# Patient Record
Sex: FEMALE | Race: WHITE | NOT HISPANIC OR LATINO | Employment: OTHER | URBAN - METROPOLITAN AREA
[De-identification: names, ages, dates, MRNs, and addresses within clinical notes are randomized per-mention and may not be internally consistent; named-entity substitution may affect disease eponyms.]

---

## 2023-12-14 ENCOUNTER — HOSPITAL ENCOUNTER (INPATIENT)
Facility: HOSPITAL | Age: 88
LOS: 7 days | Discharge: NURSING, REHAB OR LONG-TERM CARE | DRG: 871 | End: 2023-12-21
Attending: EMERGENCY MEDICINE | Admitting: INTERNAL MEDICINE
Payer: COMMERCIAL

## 2023-12-14 ENCOUNTER — APPOINTMENT (EMERGENCY)
Dept: RADIOLOGY | Facility: HOSPITAL | Age: 88
DRG: 871 | End: 2023-12-14
Payer: COMMERCIAL

## 2023-12-14 ENCOUNTER — APPOINTMENT (INPATIENT)
Dept: RADIOLOGY | Facility: HOSPITAL | Age: 88
DRG: 871 | End: 2023-12-14
Payer: COMMERCIAL

## 2023-12-14 DIAGNOSIS — Z79.899 POLYPHARMACY: ICD-10-CM

## 2023-12-14 DIAGNOSIS — R29.90 STROKE-LIKE SYMPTOMS: ICD-10-CM

## 2023-12-14 DIAGNOSIS — L03.113 CELLULITIS OF RIGHT UPPER EXTREMITY: ICD-10-CM

## 2023-12-14 DIAGNOSIS — L89.44 PRESSURE INJURY OF CONTIGUOUS REGION INVOLVING BACK, BUTTOCK, AND HIP, STAGE 4, UNSPECIFIED LATERALITY (HCC): ICD-10-CM

## 2023-12-14 DIAGNOSIS — I63.9 STROKE (HCC): Primary | ICD-10-CM

## 2023-12-14 PROBLEM — L89.94 STAGE IV DECUBITUS ULCER (HCC): Status: ACTIVE | Noted: 2023-12-14

## 2023-12-14 PROBLEM — N28.9 ACUTE KIDNEY INSUFFICIENCY: Status: ACTIVE | Noted: 2023-12-14

## 2023-12-14 PROBLEM — F51.01 PRIMARY INSOMNIA: Status: ACTIVE | Noted: 2023-12-14

## 2023-12-14 PROBLEM — R26.2 AMBULATORY DYSFUNCTION: Status: ACTIVE | Noted: 2023-12-14

## 2023-12-14 PROBLEM — F33.40 MAJOR DEPRESSIVE DISORDER, RECURRENT, IN REMISSION, UNSPECIFIED (HCC): Status: ACTIVE | Noted: 2023-12-14

## 2023-12-14 PROBLEM — E03.9 ACQUIRED HYPOTHYROIDISM: Status: ACTIVE | Noted: 2023-12-14

## 2023-12-14 PROBLEM — I10 PRIMARY HYPERTENSION: Status: ACTIVE | Noted: 2023-12-14

## 2023-12-14 PROBLEM — M51.34 DEGENERATIVE DISC DISEASE, THORACIC: Status: ACTIVE | Noted: 2023-12-14

## 2023-12-14 PROBLEM — G93.41 ACUTE METABOLIC ENCEPHALOPATHY: Status: ACTIVE | Noted: 2023-12-14

## 2023-12-14 PROBLEM — R79.89 ELEVATED TROPONIN: Status: ACTIVE | Noted: 2023-12-14

## 2023-12-14 LAB
2HR DELTA HS TROPONIN: -68 NG/L
2HR DELTA HS TROPONIN: -68 NG/L
4HR DELTA HS TROPONIN: -17 NG/L
4HR DELTA HS TROPONIN: -17 NG/L
ANION GAP SERPL CALCULATED.3IONS-SCNC: 6 MMOL/L
ANION GAP SERPL CALCULATED.3IONS-SCNC: 6 MMOL/L
APAP SERPL-MCNC: <2 UG/ML (ref 10–20)
APAP SERPL-MCNC: <2 UG/ML (ref 10–20)
APTT PPP: 31 SECONDS (ref 23–37)
APTT PPP: 31 SECONDS (ref 23–37)
BUN SERPL-MCNC: 35 MG/DL (ref 5–25)
BUN SERPL-MCNC: 35 MG/DL (ref 5–25)
CALCIUM SERPL-MCNC: 7.1 MG/DL (ref 8.4–10.2)
CALCIUM SERPL-MCNC: 7.1 MG/DL (ref 8.4–10.2)
CARDIAC TROPONIN I PNL SERPL HS: 23 NG/L
CARDIAC TROPONIN I PNL SERPL HS: 23 NG/L
CARDIAC TROPONIN I PNL SERPL HS: 74 NG/L
CARDIAC TROPONIN I PNL SERPL HS: 74 NG/L
CARDIAC TROPONIN I PNL SERPL HS: 91 NG/L
CARDIAC TROPONIN I PNL SERPL HS: 91 NG/L
CHLORIDE SERPL-SCNC: 102 MMOL/L (ref 96–108)
CHLORIDE SERPL-SCNC: 102 MMOL/L (ref 96–108)
CO2 SERPL-SCNC: 20 MMOL/L (ref 21–32)
CO2 SERPL-SCNC: 20 MMOL/L (ref 21–32)
CREAT SERPL-MCNC: 0.9 MG/DL (ref 0.6–1.3)
CREAT SERPL-MCNC: 0.9 MG/DL (ref 0.6–1.3)
CRP SERPL QL: 278.6 MG/L
CRP SERPL QL: 278.6 MG/L
ERYTHROCYTE [DISTWIDTH] IN BLOOD BY AUTOMATED COUNT: 16.7 % (ref 11.6–15.1)
ERYTHROCYTE [DISTWIDTH] IN BLOOD BY AUTOMATED COUNT: 16.7 % (ref 11.6–15.1)
ERYTHROCYTE [SEDIMENTATION RATE] IN BLOOD: >130 MM/HOUR (ref 0–29)
ERYTHROCYTE [SEDIMENTATION RATE] IN BLOOD: >130 MM/HOUR (ref 0–29)
ETHANOL SERPL-MCNC: <10 MG/DL
ETHANOL SERPL-MCNC: <10 MG/DL
FLUAV RNA RESP QL NAA+PROBE: NEGATIVE
FLUAV RNA RESP QL NAA+PROBE: NEGATIVE
FLUBV RNA RESP QL NAA+PROBE: NEGATIVE
FLUBV RNA RESP QL NAA+PROBE: NEGATIVE
GFR SERPL CREATININE-BSD FRML MDRD: 58 ML/MIN/1.73SQ M
GFR SERPL CREATININE-BSD FRML MDRD: 58 ML/MIN/1.73SQ M
GLUCOSE SERPL-MCNC: 150 MG/DL (ref 65–140)
GLUCOSE SERPL-MCNC: 150 MG/DL (ref 65–140)
GLUCOSE SERPL-MCNC: 182 MG/DL (ref 65–140)
GLUCOSE SERPL-MCNC: 182 MG/DL (ref 65–140)
HCT VFR BLD AUTO: 31.6 % (ref 34.8–46.1)
HCT VFR BLD AUTO: 31.6 % (ref 34.8–46.1)
HGB BLD-MCNC: 10.6 G/DL (ref 11.5–15.4)
HGB BLD-MCNC: 10.6 G/DL (ref 11.5–15.4)
INR PPP: 2.11 (ref 0.84–1.19)
INR PPP: 2.11 (ref 0.84–1.19)
LACTATE SERPL-SCNC: 1.6 MMOL/L (ref 0.5–2)
LACTATE SERPL-SCNC: 1.6 MMOL/L (ref 0.5–2)
MCH RBC QN AUTO: 28.4 PG (ref 26.8–34.3)
MCH RBC QN AUTO: 28.4 PG (ref 26.8–34.3)
MCHC RBC AUTO-ENTMCNC: 33.5 G/DL (ref 31.4–37.4)
MCHC RBC AUTO-ENTMCNC: 33.5 G/DL (ref 31.4–37.4)
MCV RBC AUTO: 85 FL (ref 82–98)
MCV RBC AUTO: 85 FL (ref 82–98)
PLATELET # BLD AUTO: 169 THOUSANDS/UL (ref 149–390)
PLATELET # BLD AUTO: 169 THOUSANDS/UL (ref 149–390)
PMV BLD AUTO: 10.5 FL (ref 8.9–12.7)
PMV BLD AUTO: 10.5 FL (ref 8.9–12.7)
POTASSIUM SERPL-SCNC: 4.5 MMOL/L (ref 3.5–5.3)
POTASSIUM SERPL-SCNC: 4.5 MMOL/L (ref 3.5–5.3)
PROTHROMBIN TIME: 23.3 SECONDS (ref 11.6–14.5)
PROTHROMBIN TIME: 23.3 SECONDS (ref 11.6–14.5)
RBC # BLD AUTO: 3.73 MILLION/UL (ref 3.81–5.12)
RBC # BLD AUTO: 3.73 MILLION/UL (ref 3.81–5.12)
RSV RNA RESP QL NAA+PROBE: NEGATIVE
RSV RNA RESP QL NAA+PROBE: NEGATIVE
SALICYLATES SERPL-MCNC: <5 MG/DL (ref 3–20)
SALICYLATES SERPL-MCNC: <5 MG/DL (ref 3–20)
SARS-COV-2 RNA RESP QL NAA+PROBE: NEGATIVE
SARS-COV-2 RNA RESP QL NAA+PROBE: NEGATIVE
SODIUM SERPL-SCNC: 128 MMOL/L (ref 135–147)
SODIUM SERPL-SCNC: 128 MMOL/L (ref 135–147)
TSH SERPL DL<=0.05 MIU/L-ACNC: 0.93 UIU/ML (ref 0.45–4.5)
TSH SERPL DL<=0.05 MIU/L-ACNC: 0.93 UIU/ML (ref 0.45–4.5)
WBC # BLD AUTO: 33.44 THOUSAND/UL (ref 4.31–10.16)
WBC # BLD AUTO: 33.44 THOUSAND/UL (ref 4.31–10.16)

## 2023-12-14 PROCEDURE — 84443 ASSAY THYROID STIM HORMONE: CPT | Performed by: INTERNAL MEDICINE

## 2023-12-14 PROCEDURE — 85652 RBC SED RATE AUTOMATED: CPT | Performed by: INTERNAL MEDICINE

## 2023-12-14 PROCEDURE — 80048 BASIC METABOLIC PNL TOTAL CA: CPT | Performed by: EMERGENCY MEDICINE

## 2023-12-14 PROCEDURE — 0241U HB NFCT DS VIR RESP RNA 4 TRGT: CPT | Performed by: EMERGENCY MEDICINE

## 2023-12-14 PROCEDURE — 87181 SC STD AGAR DILUTION PER AGT: CPT

## 2023-12-14 PROCEDURE — 82077 ASSAY SPEC XCP UR&BREATH IA: CPT

## 2023-12-14 PROCEDURE — 85027 COMPLETE CBC AUTOMATED: CPT | Performed by: EMERGENCY MEDICINE

## 2023-12-14 PROCEDURE — 70496 CT ANGIOGRAPHY HEAD: CPT

## 2023-12-14 PROCEDURE — 85610 PROTHROMBIN TIME: CPT | Performed by: EMERGENCY MEDICINE

## 2023-12-14 PROCEDURE — 74176 CT ABD & PELVIS W/O CONTRAST: CPT

## 2023-12-14 PROCEDURE — 36415 COLL VENOUS BLD VENIPUNCTURE: CPT | Performed by: EMERGENCY MEDICINE

## 2023-12-14 PROCEDURE — 71250 CT THORAX DX C-: CPT

## 2023-12-14 PROCEDURE — 96365 THER/PROPH/DIAG IV INF INIT: CPT

## 2023-12-14 PROCEDURE — 93005 ELECTROCARDIOGRAM TRACING: CPT

## 2023-12-14 PROCEDURE — 80143 DRUG ASSAY ACETAMINOPHEN: CPT

## 2023-12-14 PROCEDURE — 99291 CRITICAL CARE FIRST HOUR: CPT | Performed by: EMERGENCY MEDICINE

## 2023-12-14 PROCEDURE — 71045 X-RAY EXAM CHEST 1 VIEW: CPT

## 2023-12-14 PROCEDURE — 83605 ASSAY OF LACTIC ACID: CPT

## 2023-12-14 PROCEDURE — 82948 REAGENT STRIP/BLOOD GLUCOSE: CPT

## 2023-12-14 PROCEDURE — 99285 EMERGENCY DEPT VISIT HI MDM: CPT

## 2023-12-14 PROCEDURE — 84484 ASSAY OF TROPONIN QUANT: CPT | Performed by: EMERGENCY MEDICINE

## 2023-12-14 PROCEDURE — 99223 1ST HOSP IP/OBS HIGH 75: CPT | Performed by: PSYCHIATRY & NEUROLOGY

## 2023-12-14 PROCEDURE — 86140 C-REACTIVE PROTEIN: CPT | Performed by: INTERNAL MEDICINE

## 2023-12-14 PROCEDURE — 80179 DRUG ASSAY SALICYLATE: CPT

## 2023-12-14 PROCEDURE — 87040 BLOOD CULTURE FOR BACTERIA: CPT

## 2023-12-14 PROCEDURE — 87147 CULTURE TYPE IMMUNOLOGIC: CPT

## 2023-12-14 PROCEDURE — 96361 HYDRATE IV INFUSION ADD-ON: CPT

## 2023-12-14 PROCEDURE — 87154 CUL TYP ID BLD PTHGN 6+ TRGT: CPT

## 2023-12-14 PROCEDURE — 70498 CT ANGIOGRAPHY NECK: CPT

## 2023-12-14 PROCEDURE — 84484 ASSAY OF TROPONIN QUANT: CPT | Performed by: INTERNAL MEDICINE

## 2023-12-14 PROCEDURE — 99223 1ST HOSP IP/OBS HIGH 75: CPT | Performed by: INTERNAL MEDICINE

## 2023-12-14 PROCEDURE — 85730 THROMBOPLASTIN TIME PARTIAL: CPT | Performed by: EMERGENCY MEDICINE

## 2023-12-14 RX ORDER — LATANOPROST 50 UG/ML
1 SOLUTION/ DROPS OPHTHALMIC
COMMUNITY

## 2023-12-14 RX ORDER — DOCUSATE SODIUM 100 MG/1
100 CAPSULE, LIQUID FILLED ORAL 2 TIMES DAILY PRN
Status: DISCONTINUED | OUTPATIENT
Start: 2023-12-14 | End: 2023-12-18

## 2023-12-14 RX ORDER — DULOXETIN HYDROCHLORIDE 30 MG/1
30 CAPSULE, DELAYED RELEASE ORAL DAILY
COMMUNITY

## 2023-12-14 RX ORDER — METHOCARBAMOL 750 MG/1
750 TABLET, FILM COATED ORAL EVERY 4 HOURS PRN
COMMUNITY

## 2023-12-14 RX ORDER — ACETAMINOPHEN 325 MG/1
650 TABLET ORAL EVERY 6 HOURS PRN
Status: DISCONTINUED | OUTPATIENT
Start: 2023-12-14 | End: 2023-12-15

## 2023-12-14 RX ORDER — LORAZEPAM 1 MG/1
1 TABLET ORAL EVERY 6 HOURS PRN
COMMUNITY

## 2023-12-14 RX ORDER — LATANOPROST 50 UG/ML
1 SOLUTION/ DROPS OPHTHALMIC
Status: DISCONTINUED | OUTPATIENT
Start: 2023-12-14 | End: 2023-12-21 | Stop reason: HOSPADM

## 2023-12-14 RX ORDER — AMMONIUM LACTATE 12 G/100G
1 LOTION TOPICAL DAILY
COMMUNITY

## 2023-12-14 RX ORDER — ACETAMINOPHEN 325 MG/1
650 TABLET ORAL EVERY 6 HOURS PRN
COMMUNITY

## 2023-12-14 RX ORDER — LANOLIN ALCOHOL/MO/W.PET/CERES
3 CREAM (GRAM) TOPICAL
COMMUNITY

## 2023-12-14 RX ORDER — ONDANSETRON 2 MG/ML
4 INJECTION INTRAMUSCULAR; INTRAVENOUS EVERY 6 HOURS PRN
Status: DISCONTINUED | OUTPATIENT
Start: 2023-12-14 | End: 2023-12-21 | Stop reason: HOSPADM

## 2023-12-14 RX ORDER — SODIUM CHLORIDE, SODIUM GLUCONATE, SODIUM ACETATE, POTASSIUM CHLORIDE, MAGNESIUM CHLORIDE, SODIUM PHOSPHATE, DIBASIC, AND POTASSIUM PHOSPHATE .53; .5; .37; .037; .03; .012; .00082 G/100ML; G/100ML; G/100ML; G/100ML; G/100ML; G/100ML; G/100ML
100 INJECTION, SOLUTION INTRAVENOUS ONCE
Status: COMPLETED | OUTPATIENT
Start: 2023-12-14 | End: 2023-12-15

## 2023-12-14 RX ORDER — METOPROLOL SUCCINATE 100 MG/1
100 TABLET, EXTENDED RELEASE ORAL DAILY
Status: DISCONTINUED | OUTPATIENT
Start: 2023-12-15 | End: 2023-12-21 | Stop reason: HOSPADM

## 2023-12-14 RX ORDER — METOPROLOL SUCCINATE 100 MG/1
100 TABLET, EXTENDED RELEASE ORAL DAILY
COMMUNITY

## 2023-12-14 RX ORDER — AMMONIUM LACTATE 12 G/100G
1 LOTION TOPICAL DAILY
Status: DISCONTINUED | OUTPATIENT
Start: 2023-12-15 | End: 2023-12-21 | Stop reason: HOSPADM

## 2023-12-14 RX ORDER — PSEUDOEPHEDRINE HCL 30 MG
60 TABLET ORAL EVERY 6 HOURS PRN
COMMUNITY

## 2023-12-14 RX ORDER — ATORVASTATIN CALCIUM 10 MG/1
10 TABLET, FILM COATED ORAL DAILY
Status: DISCONTINUED | OUTPATIENT
Start: 2023-12-15 | End: 2023-12-21 | Stop reason: HOSPADM

## 2023-12-14 RX ORDER — LEVOTHYROXINE SODIUM 0.03 MG/1
25 TABLET ORAL
Status: DISCONTINUED | OUTPATIENT
Start: 2023-12-15 | End: 2023-12-21 | Stop reason: HOSPADM

## 2023-12-14 RX ORDER — AMIODARONE HYDROCHLORIDE 200 MG/1
200 TABLET ORAL DAILY
Status: DISCONTINUED | OUTPATIENT
Start: 2023-12-15 | End: 2023-12-21 | Stop reason: HOSPADM

## 2023-12-14 RX ORDER — LEVOTHYROXINE SODIUM 0.03 MG/1
25 TABLET ORAL DAILY
COMMUNITY

## 2023-12-14 RX ORDER — FLUTICASONE FUROATE AND VILANTEROL 100; 25 UG/1; UG/1
1 POWDER RESPIRATORY (INHALATION) DAILY
COMMUNITY

## 2023-12-14 RX ORDER — ALPRAZOLAM 0.5 MG/1
0.5 TABLET ORAL
COMMUNITY

## 2023-12-14 RX ORDER — HALOPERIDOL 2 MG/ML
1 SOLUTION ORAL EVERY 6 HOURS PRN
COMMUNITY

## 2023-12-14 RX ORDER — PSEUDOEPHEDRINE HYDROCHLORIDE 60 MG/1
60 TABLET, FILM COATED ORAL EVERY 6 HOURS PRN
Status: DISCONTINUED | OUTPATIENT
Start: 2023-12-14 | End: 2023-12-21 | Stop reason: HOSPADM

## 2023-12-14 RX ORDER — AMLODIPINE BESYLATE 5 MG/1
5 TABLET ORAL DAILY
Status: DISCONTINUED | OUTPATIENT
Start: 2023-12-15 | End: 2023-12-19

## 2023-12-14 RX ORDER — MAGNESIUM HYDROXIDE/ALUMINUM HYDROXICE/SIMETHICONE 120; 1200; 1200 MG/30ML; MG/30ML; MG/30ML
30 SUSPENSION ORAL EVERY 6 HOURS PRN
Status: DISCONTINUED | OUTPATIENT
Start: 2023-12-14 | End: 2023-12-21 | Stop reason: HOSPADM

## 2023-12-14 RX ORDER — LISINOPRIL 10 MG/1
10 TABLET ORAL DAILY
Status: DISCONTINUED | OUTPATIENT
Start: 2023-12-15 | End: 2023-12-21 | Stop reason: HOSPADM

## 2023-12-14 RX ORDER — AMIODARONE HYDROCHLORIDE 200 MG/1
200 TABLET ORAL DAILY
COMMUNITY

## 2023-12-14 RX ORDER — LISINOPRIL 10 MG/1
10 TABLET ORAL DAILY
COMMUNITY

## 2023-12-14 RX ORDER — OXYCODONE HYDROCHLORIDE AND ACETAMINOPHEN 5; 325 MG/1; MG/1
1 TABLET ORAL EVERY 8 HOURS PRN
COMMUNITY

## 2023-12-14 RX ORDER — LIDOCAINE 4 G/G
1 PATCH TOPICAL DAILY
COMMUNITY

## 2023-12-14 RX ORDER — ALPRAZOLAM 0.5 MG/1
0.5 TABLET, ORALLY DISINTEGRATING ORAL EVERY 6 HOURS PRN
COMMUNITY

## 2023-12-14 RX ORDER — FERROUS SULFATE 325(65) MG
325 TABLET ORAL
COMMUNITY

## 2023-12-14 RX ORDER — FERROUS SULFATE 325(65) MG
325 TABLET ORAL
Status: DISCONTINUED | OUTPATIENT
Start: 2023-12-14 | End: 2023-12-21 | Stop reason: HOSPADM

## 2023-12-14 RX ORDER — FLUTICASONE FUROATE AND VILANTEROL 100; 25 UG/1; UG/1
1 POWDER RESPIRATORY (INHALATION) DAILY
Status: DISCONTINUED | OUTPATIENT
Start: 2023-12-15 | End: 2023-12-21 | Stop reason: HOSPADM

## 2023-12-14 RX ORDER — AMLODIPINE BESYLATE AND ATORVASTATIN CALCIUM 5; 10 MG/1; MG/1
1 TABLET, FILM COATED ORAL DAILY
COMMUNITY

## 2023-12-14 RX ORDER — LIDOCAINE 50 MG/G
1 PATCH TOPICAL DAILY
Status: DISCONTINUED | OUTPATIENT
Start: 2023-12-15 | End: 2023-12-21 | Stop reason: HOSPADM

## 2023-12-14 RX ADMIN — SODIUM CHLORIDE 1000 ML: 0.9 INJECTION, SOLUTION INTRAVENOUS at 20:22

## 2023-12-14 RX ADMIN — IOHEXOL 85 ML: 350 INJECTION, SOLUTION INTRAVENOUS at 17:37

## 2023-12-14 RX ADMIN — VANCOMYCIN HYDROCHLORIDE 1500 MG: 1 INJECTION, POWDER, LYOPHILIZED, FOR SOLUTION INTRAVENOUS at 21:44

## 2023-12-14 RX ADMIN — SODIUM CHLORIDE, SODIUM GLUCONATE, SODIUM ACETATE, POTASSIUM CHLORIDE, MAGNESIUM CHLORIDE, SODIUM PHOSPHATE, DIBASIC, AND POTASSIUM PHOSPHATE 100 ML/HR: .53; .5; .37; .037; .03; .012; .00082 INJECTION, SOLUTION INTRAVENOUS at 21:44

## 2023-12-14 RX ADMIN — SODIUM CHLORIDE 500 ML: 0.9 INJECTION, SOLUTION INTRAVENOUS at 19:15

## 2023-12-14 RX ADMIN — CEFEPIME 2000 MG: 2 INJECTION, POWDER, FOR SOLUTION INTRAVENOUS at 20:22

## 2023-12-14 NOTE — ASSESSMENT & PLAN NOTE
Patient has a PMH of HTN, HLD, afib, asthma, obesity, and anxiety. Patient is on Eliquis 2.5 mg BID for afib.  Patient was discovered to have a R facial droop and slurred speech by her family. Her BP with EMS was  and Glucose in the 200s. Patient comes from [facility] with an unknown last known well. NIHSS of 7. CTH showed no acute intracranial abnormalities. CTA showed no large vessel occlusion or acute thrombus but it was a difficult study.    Initial NIHSS 7  Presenting deficits were: R facial droop and slurred speech - unable to be appreciated on NIHSS  Interventions: Patient not a candidate. Unclear time of onset outside appropriate time window.     Vascular risk factors: elevated cholesterol and weight    Workup:  Lab Results   Component Value Date    INR 2.09 (H) 12/15/2023      CTH: No evidence of acute vascular territorial infarction, intracranial hemorrhage or mass.   CTA: Technically suboptimal exam. No evidence of large vessel occlusion or acute thrombus in the proximal portions of the major vessels of the [place]. No evidence of hemodynamically significant stenosis or occlusion of the carotid or vertebral arteries. Small bilateral pleural effusions and bibasilar atelectasis.  MRI: Deferred  TTE/MARIANA 10/9/23:  Left ventricular cavity size is normal. Wall thickness is mildly increased. There is mild concentric hypertrophy. The left ventricular ejection fraction is 55-60%. Systolic function is normal. Wall motion is normal.Right Ventricle: A pacer wire is present. Left Atrium: The atrium is mildly dilated.    Metabolic Work-up:  WBC 33.44  Blood culture: Gram positive cocci in pairs and chains x2  Urine Microscopic: Bacteria innumerable   CRP: 278.6  Sed rate: >130  COVID/Flu/RSV: Negative    Pertinent scores as of 12/15/23:  - NIHSS: 7    Impression: Patient is an 88-year-old female who presented to the ED as a result of my symptoms.  Patient's symptoms secondary to metabolic  encephalopathy in the setting of sepsis. Will d/c stroke pathway at this time.    Plan:  Continue AP/AC: Eliquis 2.5 mg BID  Would recommend increasing Eliquis to 5 mg BID for primary stroke prevention secondary to Afib.  Continue cholesterol med: Lipitor 10 mg   Healthy diet encouraged  MRI Brain wo contrast deferred given severe metabolic derangements  ECHO deferred given last ECHO within the last 3 months  Optimize all metabolic derangements  PT/OT versus active exercise discussed  BP management and HTN med compliance discussed, continue normotension given unknown last known well  Rest of care as per primary care team  No further inpatient recommendations at this time, available via TT

## 2023-12-14 NOTE — CONSULTS
Consultation - Stroke   Lucero Booth 88 y.o. female MRN: [id number]  Unit/Bed#: ED 25 Encounter: [id number]      Assessment/Plan     Stroke-like symptoms  Assessment & Plan  Patient has a PMH of HTN, HLD, afib, asthma, obesity, and anxiety. Patient is on Eliquis 2.5 mg BID for afib.  Patient was discovered to have a R facial droop and slurred speech by her family. Her BP with EMS was  and Glucose in the 200s. Patient comes from [facility] with an unknown last known well. NIHSS of 7. CTH showed no acute intracranial abnormalities. CTA showed no large vessel occlusion or acute thrombus but it was a difficult study.    Initial NIHSS 7  Presenting deficits were: R facial droop and slurred speech - unable to be appreciated on NIHSS  Interventions: Patient not a candidate. Unclear time of onset outside appropriate time window.     Vascular risk factors: elevated cholesterol and weight    Workup:  Lab Results   Component Value Date    INR 2.11 (H) 12/14/2023      CTH: No evidence of acute vascular territorial infarction, intracranial hemorrhage or mass.   CTA: Technically suboptimal exam. No evidence of large vessel occlusion or acute thrombus in the proximal portions of the major vessels of the [place]. No evidence of hemodynamically significant stenosis or occlusion of the carotid or vertebral arteries. Small bilateral pleural effusions and bibasilar atelectasis.  MRI: Pending  TTE/MARIANA 10/9/23:  Left ventricular cavity size is normal. Wall thickness is mildly increased. There is mild concentric hypertrophy. The left ventricular ejection fraction is 55-60%. Systolic function is normal. Wall motion is normal.Right Ventricle: A pacer wire is present. Left Atrium: The atrium is mildly dilated.    Pertinent scores as of 12/14/23:  - NIHSS: 7    Impression: Patient is an 88-year-old female who presented to the ED as a result of my symptoms.  Patient's symptoms secondary to metabolic  encephalopathy.    Plan:  Continue AP/AC: Eliquis 2.5 mg BID  Unsure if patient on ASA at home. Continue AP/AC as per home medications  Continue cholesterol med: Lipitor 10 mg   Healthy diet encouraged  MRI Brain wo contrast pending   ECHO deferred given last ECHO within the last 3 months  Optimize all metabolic derangements  PT/OT versus active exercise discussed  BP management and HTN med compliance discussed, continue normotension given unknown last known well  Rest of care as per primary care team        Thrombolytic Decision: Patient not a candidate. Unclear time of onset outside appropriate time window.      Recommendations for outpatient neurological follow up have yet to be determined.    History of Present Illness     Reason for Consult / Principal Problem: Stroke-like symptoms  Hx and PE limited by: AMS  Patient last known well: Unknown  Stroke alert called: 4:47 PM  Neurology time of arrival: 4:50 PM    HPI: Lucero Booth is a 88 y.o. female who presents with stroke-like symptoms. Patient has a PMH of HTN, HLD, afib, asthma, obesity, and anxiety. Patient is on Eliquis 2.5 mg BID for afib.     Patient was discovered to have a R facial droop and slurred speech by her family. Her BP with EMS was  and Glucose in the 200s. Patient comes from [facility] with an unknown last known well. As per EMS, she was moaning the whole time and not following many commands. Limited history obtained given patient's altered mental status.    NIHSS of 7. CTH showed no acute intracranial abnormalities. CTA showed no large vessel occlusion or acute thrombus but it was a difficult study.    Patient not a candidate for TNK given unknown last known well.    Inpatient consult to Neurology  Consult performed by: Homero Kramer DO  Consult ordered by: Akash Carlisle MD          Review of Systems   Unable to perform ROS: Acuity of condition       Historical Information   No past medical history on file.  No past  surgical history on file.  Social History   Social History     Substance and Sexual Activity   Alcohol Use Not on file     Social History     Substance and Sexual Activity   Drug Use Not on file     No existing history information found.  No existing history information found.  Social History     Tobacco Use   Smoking Status Not on file   Smokeless Tobacco Not on file     Family History: No family history on file.    Review of previous medical records was completed.     Meds/Allergies   all current active meds have been reviewed    Not on File    Objective   Vitals:Blood pressure 143/65, pulse 72, resp. rate (!) 36, weight 100 kg (221 lb 5.5 oz), SpO2 94%.,There is no height or weight on file to calculate BMI.  No intake or output data in the 24 hours ending 12/14/23 1902    Invasive Devices:   Invasive Devices       Peripheral Intravenous Line  Duration             Peripheral IV 12/14/23 Dorsal (posterior);Left Hand <1 day    Peripheral IV 12/14/23 Right Antecubital <1 day                    Physical Exam  Vitals reviewed.   HENT:      Head: Normocephalic and atraumatic.      Mouth/Throat:      Mouth: Mucous membranes are moist.   Eyes:      Extraocular Movements: Extraocular movements intact and EOM normal.      Conjunctiva/sclera: Conjunctivae normal.   Cardiovascular:      Rate and Rhythm: Normal rate.   Pulmonary:      Comments: Patient on 4L O2 NC   Skin:     General: Skin is warm.   Neurological:      Mental Status: She is alert.      Coordination: Finger-nose-finger test: Unable to test given patient not understanding command. Heel-to-shin test: Unable to test given patient not understanding command.   Psychiatric:         Speech: Speech normal.      Comments: Some confusion versus altered mental status possibly       Neurologic Exam     Mental Status   Oriented to person.   Oriented to month.   Speech: speech is normal (No aphasia or dysarthria appreciated)  Level of consciousness: alert  Able to name  object. Able to repeat.     Cranial Nerves     CN II   Visual fields full to confrontation.     CN III, IV, VI   Extraocular motions are normal.     CN V   Facial sensation intact.     CN VII   Facial expression full, symmetric.     CN VIII   CN VIII normal.     CN XII   CN XII normal.     Motor Exam   Overall muscle tone: normal  Patient able to lift right arm and right leg against gravity with some drift appreciated in the right arm.  Patient unable to lift left arm against gravity and left leg there was a drift appreciated.     Sensory Exam   Pinprick normal.     Gait, Coordination, and Reflexes     Coordination   Finger-nose-finger test: Unable to test given patient not understanding command.  Heel-to-shin test: Unable to test given patient not understanding command.      NIHSS:  1a.Level of Consciousness: 0 = Alert   1b. LOC Questions: 1 = Answers one correctly   1c. LOC Commands: 0 = Obeys both correctly   2. Best Gaze: 0 = Normal   3. Visual: 0 = No visual field loss   4. Facial Palsy: 0=Normal symmetric movement   5a. Motor Right Arm: 2=Some effort against gravity, limb cannot get to or maintain (if cured) 90 (or 45) degrees, drifts down to bed, but has some effort against gravity   5b. Motor Left Arm: 3=No effort against gravity, limb falls   6a. Motor Right Le=No drift, limb holds 90 (or 45) degrees for full 10 seconds   6b. Motor Left Le=Drift, limb holds 90 (or 45) degrees but drifts down before full 10 seconds: does not hit bed   7. Limb Ataxia:  0=Does not understand   8. Sensory: 0=Normal; no sensory loss   9. Best Language:  0=No aphasia, normal   10. Dysarthria: 0=Normal articulation   11. Extinction and Inattention (formerly Neglect): 0=No abnormality   Total Score: 7     Time NIHSS was completed: 5:10 PM    Modified [place] Score: Unable to determine at this time    Lab Results: I have personally reviewed pertinent reports.    Imaging Studies: I have personally reviewed pertinent reports.    and I have personally reviewed pertinent films in PACS  EKG, Pathology, and Other Studies: I have personally reviewed pertinent reports.    VTE Prophylaxis: Sequential compression device (Venodyne)     Code Status: No Order  Advance Directive and Living Will:      Power of :    POLST:      Counseling / Coordination of Care  Total time spent today 55 minutes. Greater than 50% of total time was spent with the patient and / or family counseling and / or coordination of care. A description of the counseling / coordination of care: Stroke alert and further management

## 2023-12-14 NOTE — ED PROVIDER NOTES
History  Chief Complaint   Patient presents with    STROKE Alert     Pt having R sided facial droop and slurred speech. Unknown LKW.      HPI  MDM  Pt is a 88 Y O F, coming in as out of hospital stroke alert with symptoms of right facial droop and slurred speech. On arrival to the ED pt found to be more encephalopathic than focal stroke like symptoms. Hemodynamically stable, normal blood sugar. Initial NIHSS was 7 but consistent with more encephalopathy/AMS than focal deficits. Pt is on eliquis, and unknown last known normal, not a candidate for TNK. Pt is also on multiple benzos and pain meds .     Initial presentation pt is altered, hemodynamically stable, protecting  airway, breathing on her own.     On exam   General: VSS, NAD, awake, altered but protecting airway, tolerating secretions  Head: Normocephalic, atraumatic, nontender.  Eyes: EOM-No subconjunctival hemorrhages.  ENT: Nose atraumatic. MMM  No malocclusion. No stridor. Normal phonation. No drooling. Normal swallowing.   Neck: Trachea midline. No JVD.  CV: RRR.   Lungs: CTAB No tachypnea. No paradoxical motion.  Abd: +BS, soft, NT/ND. No guarding/rigidity.   MSK: Full ROM throughout. No lower extremity edema.   Skin: sacral ulcer, mild erythema of RUE, small wounds ont he LUE.  Neuro: , CN II-XII grossly intact. Motor/sensory grossly intact.  Psychiatric/Behavioral: Altered.    Exam: deferred      Ddx: acute metabolic encephalopathy, sepsis, poly pharmacy.     Plan: Pt was evaluated with sepsis labs, hemodynamically stable, unknown source of infection at this time, but will treat with empiric abx and admit for further evaluation and management.         Prior to Admission Medications   Prescriptions Last Dose Informant Patient Reported? Taking?   ALPRAZolam (NIRAVAM) 0.5 MG dissolvable tablet   Yes Yes   Sig: Take 0.5 mg by mouth every 6 (six) hours as needed for anxiety   ALPRAZolam (XANAX) 0.5 mg tablet   Yes Yes   Sig: Take 0.5 mg by mouth daily at  bedtime   DULoxetine (CYMBALTA) 30 mg delayed release capsule   Yes Yes   Sig: Take 30 mg by mouth daily   Diclofenac Sodium (VOLTAREN) 1 %   Yes Yes   Sig: Apply 2 g topically 4 (four) times a day   Fluticasone Furoate-Vilanterol (Breo Ellipta) 100-25 mcg/actuation inhaler   Yes Yes   Sig: Inhale 1 puff daily Rinse mouth after use.   LORazepam (ATIVAN) 1 mg tablet   Yes Yes   Sig: Take 1 mg by mouth every 6 (six) hours as needed for anxiety   Lidocaine (HM Lidocaine Patch) 4 % PTCH   Yes Yes   Sig: Apply 1 Application topically in the morning   acetaminophen (TYLENOL) 325 mg tablet   Yes Yes   Sig: Take 650 mg by mouth every 6 (six) hours as needed for mild pain, headaches or fever   amLODIPine-atorvastatin (CADUET) 5-10 MG per tablet   Yes Yes   Sig: Take 1 tablet by mouth daily   amiodarone 200 mg tablet   Yes Yes   Sig: Take 200 mg by mouth daily   ammonium lactate (LAC-HYDRIN) 12 % lotion   Yes Yes   Sig: Apply 1 Application topically daily   apixaban (Eliquis) 2.5 mg   Yes Yes   Sig: Take 2.5 mg by mouth 2 (two) times a day   camphor-menthol (SARNA) lotion   Yes Yes   Sig: Apply 1 Application topically as needed for itching   ferrous sulfate 325 (65 Fe) mg tablet   Yes Yes   Sig: Take 325 mg by mouth every other day   haloperidol (HALDOL) oral concentrated solution   Yes Yes   Sig: Take 1 mg by mouth every 6 (six) hours as needed for agitation   hyoscyamine (LEVSIN/SL) 0.125 mg SL tablet   Yes Yes   Sig: Take 0.125 mg by mouth every 6 (six) hours as needed for cramping   latanoprost (XALATAN) 0.005 % ophthalmic solution   Yes Yes   Si drop daily at bedtime   levothyroxine 25 mcg tablet   Yes Yes   Sig: Take 25 mcg by mouth daily   lisinopril (ZESTRIL) 10 mg tablet   Yes Yes   Sig: Take 10 mg by mouth daily   melatonin 3 mg   Yes Yes   Sig: Take 3 mg by mouth daily at bedtime   methocarbamol (ROBAXIN) 750 mg tablet   Yes Yes   Sig: Take 750 mg by mouth every 4 (four) hours as needed for muscle spasms    metoprolol succinate (TOPROL-XL) 100 mg 24 hr tablet   Yes Yes   Sig: Take 100 mg by mouth daily   oxyCODONE-acetaminophen (PERCOCET) 5-325 mg per tablet   Yes Yes   Sig: Take 1 tablet by mouth every 8 (eight) hours as needed for moderate pain   pseudoephedrine (SUDAFED) 30 mg tablet   Yes Yes   Sig: Take 60 mg by mouth every 6 (six) hours as needed for congestion      Facility-Administered Medications: None       No past medical history on file.    No past surgical history on file.    No family history on file.  I have reviewed and agree with the history as documented.    No existing history information found.  No existing history information found.        Review of Systems    Physical Exam  ED Triage Vitals   Temperature Pulse Respirations Blood Pressure SpO2   12/14/23 1930 12/14/23 1705 12/14/23 1705 12/14/23 1705 12/14/23 1705   (!) 97.3 °F (36.3 °C) 77 (!) 23 (!) 138/105 100 %      Temp Source Heart Rate Source Patient Position - Orthostatic VS BP Location FiO2 (%)   12/14/23 1930 12/14/23 1705 12/14/23 1740 12/14/23 2200 --   Oral Monitor Lying Left arm       Pain Score       12/15/23 0042       9             Orthostatic Vital Signs  Vitals:    12/17/23 2150 12/18/23 0323 12/18/23 0654 12/18/23 1135   BP: 125/55 130/55 127/55 137/56   Pulse: 64 61 61 62   Patient Position - Orthostatic VS:  Lying         Physical Exam    ED Medications  Medications   amiodarone tablet 200 mg (200 mg Oral Given 12/18/23 0854)   amLODIPine (NORVASC) tablet 5 mg (5 mg Oral Given 12/18/23 0854)     And   atorvastatin (LIPITOR) tablet 10 mg (10 mg Oral Given 12/18/23 0854)   ammonium lactate (LAC-HYDRIN) 12 % lotion 1 Application (1 Application Topical Given 12/18/23 0905)   Diclofenac Sodium (VOLTAREN) 1 % topical gel 2 g (2 g Topical Given 12/18/23 1204)   ferrous sulfate tablet 325 mg (325 mg Oral Given 12/16/23 2102)   Fluticasone Furoate-Vilanterol 100-25 mcg/actuation 1 puff (1 puff Inhalation Given 12/18/23 0907)    hyoscyamine (LEVSIN/SL) SL tablet 0.125 mg (has no administration in time range)   latanoprost (XALATAN) 0.005 % ophthalmic solution 1 drop (1 drop Both Eyes Given 12/17/23 2157)   levothyroxine tablet 25 mcg (25 mcg Oral Given 12/18/23 0519)   lidocaine (LIDODERM) 5 % patch 1 patch (1 patch Topical Medication Applied 12/18/23 0854)   lisinopril (ZESTRIL) tablet 10 mg (10 mg Oral Given 12/18/23 0854)   metoprolol succinate (TOPROL-XL) 24 hr tablet 100 mg (100 mg Oral Given 12/18/23 0854)   pseudoephedrine (SUDAFED) tablet 60 mg (has no administration in time range)   docusate sodium (COLACE) capsule 100 mg (100 mg Oral Given 12/18/23 1239)   ondansetron (ZOFRAN) injection 4 mg (has no administration in time range)   aluminum-magnesium hydroxide-simethicone (MAALOX) oral suspension 30 mL (has no administration in time range)   apixaban (ELIQUIS) tablet 5 mg (5 mg Oral Given 12/18/23 0854)   acetaminophen (TYLENOL) tablet 975 mg (975 mg Oral Given 12/18/23 0519)   HYDROmorphone HCl (DILAUDID) injection 0.2 mg (0.2 mg Intravenous Given 12/18/23 1203)   oxyCODONE (ROXICODONE) IR tablet 5 mg (5 mg Oral Given 12/18/23 0858)   oxyCODONE (ROXICODONE) split tablet 2.5 mg (has no administration in time range)   ceFAZolin (ANCEF) IVPB (premix in dextrose) 2,000 mg 50 mL (0 mg Intravenous Stopped 12/18/23 1233)   DULoxetine (CYMBALTA) delayed release capsule 30 mg (30 mg Oral Given 12/18/23 0854)   melatonin tablet 3 mg (3 mg Oral Given 12/17/23 2157)   magnesium sulfate IVPB (premix) SOLN 1 g (1 g Intravenous New Bag 12/18/23 1240)   iohexol (OMNIPAQUE) 350 MG/ML injection (MULTI-DOSE) 85 mL (85 mL Intravenous Given 12/14/23 1737)   sodium chloride 0.9 % bolus 500 mL (0 mL Intravenous Stopped 12/14/23 2115)   sodium chloride 0.9 % bolus 1,000 mL (0 mL Intravenous Stopped 12/14/23 2113)   vancomycin (VANCOCIN) 1500 mg in sodium chloride 0.9% 250 mL IVPB (0 mg Intravenous Stopped 12/14/23 2314)   cefepime (MAXIPIME) 2 g/50 mL  dextrose IVPB (0 mg Intravenous Stopped 12/14/23 2052)   multi-electrolyte (PLASMALYTE-A/ISOLYTE-S PH 7.4) IV solution (0 mL/hr Intravenous Stopped 12/15/23 0235)   potassium chloride 20 mEq IVPB (premix) (20 mEq Intravenous New Bag 12/15/23 1042)   furosemide (LASIX) injection 20 mg (20 mg Intravenous Given 12/15/23 1551)   potassium chloride 20 mEq IVPB (premix) (20 mEq Intravenous New Bag 12/15/23 1552)   furosemide (LASIX) injection 20 mg (20 mg Intravenous Given 12/16/23 1054)   magnesium sulfate IVPB (premix) SOLN 1 g (1 g Intravenous New Bag 12/17/23 1049)   furosemide (LASIX) injection 20 mg (20 mg Intravenous Given 12/17/23 1048)       Diagnostic Studies  Results Reviewed       Procedure Component Value Units Date/Time    Blood culture [[id number]]  (Abnormal) Collected: 12/14/23 2016    Lab Status: Final result Specimen: Blood from Arm, Left Updated: 12/17/23 0628     Blood Culture Beta Hemolytic Streptococcus Group G     Gram Stain Result Gram positive cocci in pairs and chains    Blood culture [[id number]]  (Abnormal)  (Susceptibility) Collected: 12/14/23 2016    Lab Status: Final result Specimen: Blood from Hand, Left Updated: 12/17/23 0628     Blood Culture Beta Hemolytic Streptococcus Group G     Gram Stain Result Gram positive cocci in pairs and chains    Susceptibility       Beta Hemolytic Streptococcus Group G (1)       Antibiotic Interpretation Microscan   Method Status    Penicillin Susceptible 0.023 ug/ml CHRISTEN Final    Ceftriaxone ($$) Susceptible 0.02 ug/ml CHRISTEN Final    Vancomycin ($) Susceptible 0.38 ug/ml CHRISTEN Final                       Blood Culture Identification Panel [[id number]]  (Abnormal) Collected: 12/14/23 2016    Lab Status: Final result Specimen: Blood from Hand, Left Updated: 12/17/23 0628     Streptococcus Detected    Narrative:      Film Array panel tests for 11 gram positive organisms, 15 gram negative organisms, 7 yeast species and 10 resistance genes.     MRSA culture [[id number]]   (Abnormal) Collected: 12/15/23 0456    Lab Status: Final result Specimen: Nares from Nose Updated: 12/16/23 7537     MRSA Culture Only Methicillin Resistant Staphylococcus aureus isolated      Please note: This patient requires contact precautions.    Vancomycin, trough [[id number]]  (Abnormal) Collected: 12/16/23 0638    Lab Status: Final result Specimen: Blood from Arm, Left Updated: 12/16/23 0852     Vancomycin Tr 21.8 ug/mL     Narrative:      Verify that this specimen was collected immediately prior to drug administration.    Reference range and result flagging reflect proper specimen collection protocol.    CBC and differential [[id number]]  (Abnormal) Collected: 12/15/23 0447    Lab Status: Final result Specimen: Blood from Arm, Right Updated: 12/15/23 0840     WBC 30.89 Thousand/uL      RBC 3.72 Million/uL      Hemoglobin 10.2 g/dL      Hematocrit 32.6 %      MCV 88 fL      MCH 27.4 pg      MCHC 31.3 g/dL      RDW 16.6 %      MPV 11.1 fL      Platelets 154 Thousands/uL     Narrative:      This is an appended report.  These results have been appended to a previously verified report.    Protime-INR [[id number]]  (Abnormal) Collected: 12/15/23 0447    Lab Status: Final result Specimen: Blood from Arm, Right Updated: 12/15/23 0712     Protime 23.1 seconds      INR 2.09    APTT [[id number]]  (Normal) Collected: 12/15/23 0447    Lab Status: Final result Specimen: Blood from Arm, Right Updated: 12/15/23 0712     PTT 36 seconds     Comprehensive metabolic panel [[id number]]  (Abnormal) Collected: 12/15/23 0447    Lab Status: Final result Specimen: Blood from Arm, Right Updated: 12/15/23 0612     Sodium 131 mmol/L      Potassium 3.4 mmol/L      Chloride 100 mmol/L      CO2 21 mmol/L      ANION GAP 10 mmol/L      BUN 34 mg/dL      Creatinine 0.96 mg/dL      Glucose 117 mg/dL      Calcium 7.7 mg/dL      Corrected Calcium 9.1 mg/dL      AST 28 U/L      ALT 12 U/L      Alkaline Phosphatase 127 U/L      Total Protein 6.1 g/dL       Albumin 2.2 g/dL      Total Bilirubin 1.28 mg/dL      eGFR 53 ml/min/1.73sq m     Narrative:      National Kidney Disease Foundation guidelines for Chronic Kidney Disease (CKD):     Stage 1 with normal or high GFR (GFR > 90 mL/min/1.73 square meters)    Stage 2 Mild CKD (GFR = 60-89 mL/min/1.73 square meters)    Stage 3A Moderate CKD (GFR = 45-59 mL/min/1.73 square meters)    Stage 3B Moderate CKD (GFR = 30-44 mL/min/1.73 square meters)    Stage 4 Severe CKD (GFR = 15-29 mL/min/1.73 square meters)    Stage 5 End Stage CKD (GFR <15 mL/min/1.73 square meters)  Note: GFR calculation is accurate only with a steady state creatinine    Magnesium [[id number]]  (Normal) Collected: 12/15/23 0447    Lab Status: Final result Specimen: Blood from Arm, Right Updated: 12/15/23 0612     Magnesium 1.9 mg/dL     Phosphorus [[id number]]  (Normal) Collected: 12/15/23 0447    Lab Status: Final result Specimen: Blood from Arm, Right Updated: 12/15/23 0612     Phosphorus 2.8 mg/dL     Urine Microscopic [[id number]]  (Abnormal) Collected: 12/15/23 0047    Lab Status: Final result Specimen: Urine, Straight Cath Updated: 12/15/23 0208     RBC, UA 2-4 /hpf      WBC, UA 1-2 /hpf      Epithelial Cells Occasional /hpf      Bacteria, UA Innumerable /hpf     UA w Reflex to Microscopic w Reflex to Culture [[id number]]  (Abnormal) Collected: 12/15/23 0047    Lab Status: Final result Specimen: Urine, Straight Cath Updated: 12/15/23 0105     Color, UA Yellow     Clarity, UA Clear     Specific Gravity, UA 1.035     pH, UA 5.5     Leukocytes, UA Negative     Nitrite, UA Negative     Protein, UA 30 (1+) mg/dl      Glucose, UA Negative mg/dl      Ketones, UA Negative mg/dl      Urobilinogen, UA 6.0 mg/dl      Bilirubin, UA Negative     Occult Blood, UA Trace    C-reactive protein [[id number]]  (Abnormal) Collected: 12/14/23 2241    Lab Status: Final result Specimen: Blood from Arm, Left Updated: 12/14/23 2308     .6 mg/L     HS Troponin I 4hr  [[id number]]  (Abnormal) Collected: 12/14/23 2201    Lab Status: Final result Specimen: Blood from Arm, Left Updated: 12/14/23 2242     hs TnI 4hr 74 ng/L      Delta 4hr hsTnI -17 ng/L     TSH, 3rd generation [[id number]]  (Normal) Collected: 12/14/23 1917    Lab Status: Final result Specimen: Blood from Arm, Right Updated: 12/14/23 2147     TSH 3RD GENERATON 0.929 uIU/mL     Sedimentation rate, automated [[id number]]  (Abnormal) Collected: 12/14/23 1917    Lab Status: Final result Specimen: Blood from Arm, Right Updated: 12/14/23 2119     Sed Rate >130 mm/hour     HS Troponin I 2hr [[id number]]  (Normal) Collected: 12/14/23 2016    Lab Status: Final result Specimen: Blood from Arm, Left Updated: 12/14/23 2057     hs TnI 2hr 23 ng/L      Delta 2hr hsTnI -68 ng/L     Lactic acid, plasma (w/reflex if result > 2.0) [[id number]]  (Normal) Collected: 12/14/23 2016    Lab Status: Final result Specimen: Blood from Arm, Left Updated: 12/14/23 2050     LACTIC ACID 1.6 mmol/L     Narrative:      Result may be elevated if tourniquet was used during collection.    Salicylate level [[id number]]  (Normal) Collected: 12/14/23 1917    Lab Status: Final result Specimen: Blood from Arm, Right Updated: 12/14/23 2008     Salicylate Lvl <5 mg/dL     Acetaminophen level-If concentration is detectable, please discuss with medical  on call. [[id number]]  (Abnormal) Collected: 12/14/23 1917    Lab Status: Final result Specimen: Blood from Arm, Right Updated: 12/14/23 2008     Acetaminophen Level <2 ug/mL     Ethanol [[id number]]  (Normal) Collected: 12/14/23 1917    Lab Status: Final result Specimen: Blood from Arm, Right Updated: 12/14/23 2006     Ethanol Lvl <10 mg/dL     CBC and Platelet [[id number]]  (Abnormal) Collected: 12/14/23 1917    Lab Status: Final result Specimen: Blood from Arm, Right Updated: 12/14/23 2000     WBC 33.44 Thousand/uL      RBC 3.73 Million/uL      Hemoglobin 10.6 g/dL      Hematocrit 31.6 %      MCV 85 fL       MCH 28.4 pg      MCHC 33.5 g/dL      RDW 16.7 %      Platelets 169 Thousands/uL      MPV 10.5 fL     FLU/RSV/COVID - if FLU/RSV clinically relevant [[id number]]  (Normal) Collected: 12/14/23 1808    Lab Status: Final result Specimen: Nares from Nose Updated: 12/14/23 1914     SARS-CoV-2 Negative     INFLUENZA A PCR Negative     INFLUENZA B PCR Negative     RSV PCR Negative    Narrative:      FOR PEDIATRIC PATIENTS - copy/paste COVID Guidelines URL to browser: [email or web address]    SARS-CoV-2 assay is a Nucleic Acid Amplification assay intended for the  qualitative detection of nucleic acid from SARS-CoV-2 in nasopharyngeal  swabs. Results are for the presumptive identification of SARS-CoV-2 RNA.    Positive results are indicative of infection with SARS-CoV-2, the virus  causing COVID-19, but do not rule out bacterial infection or co-infection  with other viruses. Laboratories within the [place] and its  territories are required to report all positive results to the appropriate  public health authorities. Negative results do not preclude SARS-CoV-2  infection and should not be used as the sole basis for treatment or other  patient management decisions. Negative results must be combined with  clinical observations, patient history, and epidemiological information.  This test has not been FDA cleared or approved.    This test has been authorized by FDA under an Emergency Use Authorization  (EUA). This test is only authorized for the duration of time the  declaration that circumstances exist justifying the authorization of the  emergency use of an in vitro diagnostic tests for detection of SARS-CoV-2  virus and/or diagnosis of COVID-19 infection under section 564(b)(1) of  the Act, 21 U.S.C. 360bbb-3(b)(1), unless the authorization is terminated  or revoked sooner. The test has been validated but independent review by FDA  and CLIA is pending.    Test  performed using [employer] GeneXpert: This RT-PCR assay targets N2,  a region unique to SARS-CoV-2. A conserved region in the E-gene was chosen  for pan-Sarbecovirus detection which includes SARS-CoV-2.    According to CMS-2020-01-R, this platform meets the definition of high-throughput technology.    Basic metabolic panel [[id number]]  (Abnormal) Collected: 12/14/23 1808    Lab Status: Final result Specimen: Blood from Arm, Right Updated: 12/14/23 1902     Sodium 128 mmol/L      Potassium 4.5 mmol/L      Chloride 102 mmol/L      CO2 20 mmol/L      ANION GAP 6 mmol/L      BUN 35 mg/dL      Creatinine 0.90 mg/dL      Glucose 150 mg/dL      Calcium 7.1 mg/dL      eGFR 58 ml/min/1.73sq m     Narrative:      National Kidney Disease Foundation guidelines for Chronic Kidney Disease (CKD):     Stage 1 with normal or high GFR (GFR > 90 mL/min/1.73 square meters)    Stage 2 Mild CKD (GFR = 60-89 mL/min/1.73 square meters)    Stage 3A Moderate CKD (GFR = 45-59 mL/min/1.73 square meters)    Stage 3B Moderate CKD (GFR = 30-44 mL/min/1.73 square meters)    Stage 4 Severe CKD (GFR = 15-29 mL/min/1.73 square meters)    Stage 5 End Stage CKD (GFR <15 mL/min/1.73 square meters)  Note: GFR calculation is accurate only with a steady state creatinine    HS Troponin 0hr (reflex protocol) [[id number]]  (Abnormal) Collected: 12/14/23 1808    Lab Status: Final result Specimen: Blood from Arm, Right Updated: 12/14/23 1856     hs TnI 0hr 91 ng/L     Protime-INR [[id number]]  (Abnormal) Collected: 12/14/23 1808    Lab Status: Final result Specimen: Blood from Arm, Right Updated: 12/14/23 1839     Protime 23.3 seconds      INR 2.11    APTT [[id number]]  (Normal) Collected: 12/14/23 1808    Lab Status: Final result Specimen: Blood from Arm, Right Updated: 12/14/23 1839     PTT 31 seconds     Fingerstick Glucose (POCT) [[id number]]  (Abnormal) Collected: 12/14/23 1708    Lab Status: Final result Updated: 12/14/23 1714     POC Glucose 182 mg/dl                     CT chest abdomen pelvis wo contrast   Final Result by Best Merida MD (12/15 0958)      1.  Moderate bilateral pleural effusions with overlying compressive atelectasis.      2.  Irregularity and sclerosis of the distal sacrum and coccyx underlying the large decubitus ulcer suspicious for osteomyelitis though chronicity is uncertain as there are no prior studies available for comparison. This would be better evaluated with    MRI.      3.  Hepatic cirrhosis.      4.  Right ventral hernia containing a nonobstructed loop of transverse colon.      The study was marked in EPIC for immediate notification.         Workstation performed: [id number]         XR chest portable   Final Result by Varghese De La Paz MD (12/15 0904)      Mild cardiomegaly.      Vascular congestion.      Layering pleural effusions and bibasilar atelectasis.      Per MRI query; no pacer.                  Workstation performed: [id number]         CTA stroke alert (head/neck)   Final Result by Fran Veras MD (12/14 1757)         1. Technically suboptimal exam. No evidence of large vessel occlusion or acute thrombus in the proximal portions of the major vessels of the [place].   2. No evidence of hemodynamically significant stenosis or occlusion of the carotid or vertebral arteries.   3. Small bilateral pleural effusions and bibasilar atelectasis.               Findings were directly discussed with Troy Vanessa at 5:55 PM .                           Workstation performed: [id number]         CT stroke alert brain   Final Result by Fran Veras MD (12/14 1757)      No evidence of acute vascular territorial infarction, intracranial hemorrhage or mass.      Findings were directly discussed with Troy Vanessa at  5:25 PM  .      Workstation performed: [id number]               Procedures  US Guided Peripheral IV    Date/Time: 12/14/2023 12:47 PM    Performed by: Loida Rodriguez DO  Authorized by: Loida  DO Michael    Patient location:  ED  Performed by:  Resident  Other Assisting Provider: Yes (comment)    Indications:     Indications: difficulty obtaining IV access    Procedure details:     Location:  Right arm    Catheter size:  20 gauge    Number of attempts:  2    Successful placement: yes    Post-procedure details:     Post-procedure:  Dressing applied    Assessment: free fluid flow and no signs of infiltration      Patient tolerance of procedure:  Tolerated well, no immediate complications        ED Course  ED Course as of 12/18/23 1257   Thu Dec 14, 2023   1914 Need temp, sodium 128.    1914 Gentle hydration, admit for encephalopathy    1958 FULL CODE     2003 WBC(!!): 33.44   2018 CT stroke alert brain   2034 Texted [facility]Im                                       Medical Decision Making  Amount and/or Complexity of Data Reviewed  Labs: ordered. Decision-making details documented in ED Course.  Radiology: ordered. Decision-making details documented in ED Course.    Risk  Prescription drug management.  Decision regarding hospitalization.          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, buttock, and hip, stage 4, unspecified laterality (HCC)           ED Disposition       ED Disposition   Admit    Condition   Stable    Date/Time   Fri Dec 15, 2023 12:58 PM    Comment   --             Follow-up Information    None         Current Discharge Medication List        CONTINUE these medications which have NOT CHANGED    Details   acetaminophen  (TYLENOL) 325 mg tablet Take 650 mg by mouth every 6 (six) hours as needed for mild pain, headaches or fever      ALPRAZolam (NIRAVAM) 0.5 MG dissolvable tablet Take 0.5 mg by mouth every 6 (six) hours as needed for anxiety      ALPRAZolam (XANAX) 0.5 mg tablet Take 0.5 mg by mouth daily at bedtime      amiodarone 200 mg tablet Take 200 mg by mouth daily      amLODIPine-atorvastatin (CADUET) 5-10 MG per tablet Take 1 tablet by mouth daily      ammonium lactate (LAC-HYDRIN) 12 % lotion Apply 1 Application topically daily      apixaban (Eliquis) 2.5 mg Take 2.5 mg by mouth 2 (two) times a day      camphor-menthol (SARNA) lotion Apply 1 Application topically as needed for itching      Diclofenac Sodium (VOLTAREN) 1 % Apply 2 g topically 4 (four) times a day      DULoxetine (CYMBALTA) 30 mg delayed release capsule Take 30 mg by mouth daily      ferrous sulfate 325 (65 Fe) mg tablet Take 325 mg by mouth every other day      Fluticasone Furoate-Vilanterol (Breo Ellipta) 100-25 mcg/actuation inhaler Inhale 1 puff daily Rinse mouth after use.      haloperidol (HALDOL) oral concentrated solution Take 1 mg by mouth every 6 (six) hours as needed for agitation      hyoscyamine (LEVSIN/SL) 0.125 mg SL tablet Take 0.125 mg by mouth every 6 (six) hours as needed for cramping      latanoprost (XALATAN) 0.005 % ophthalmic solution 1 drop daily at bedtime      levothyroxine 25 mcg tablet Take 25 mcg by mouth daily      Lidocaine (HM Lidocaine Patch) 4 % PTCH Apply 1 Application topically in the morning      lisinopril (ZESTRIL) 10 mg tablet Take 10 mg by mouth daily      LORazepam (ATIVAN) 1 mg tablet Take 1 mg by mouth every 6 (six) hours as needed for anxiety      melatonin 3 mg Take 3 mg by mouth daily at bedtime      methocarbamol (ROBAXIN) 750 mg tablet Take 750 mg by mouth every 4 (four) hours as needed for muscle spasms      metoprolol succinate (TOPROL-XL) 100 mg 24 hr tablet Take 100 mg by mouth daily       oxyCODONE-acetaminophen (PERCOCET) 5-325 mg per tablet Take 1 tablet by mouth every 8 (eight) hours as needed for moderate pain      pseudoephedrine (SUDAFED) 30 mg tablet Take 60 mg by mouth every 6 (six) hours as needed for congestion           No discharge procedures on file.    PDMP Review       None             ED Provider  Attending physically available and evaluated Lucero Booth. I managed the patient along with the ED Attending.    Electronically Signed by           Loida Rodriguez DO  12/18/23 Joann

## 2023-12-15 PROBLEM — R78.81 BACTEREMIA: Status: ACTIVE | Noted: 2023-12-15

## 2023-12-15 PROBLEM — J96.00 ACUTE RESPIRATORY FAILURE (HCC): Status: ACTIVE | Noted: 2023-12-15

## 2023-12-15 PROBLEM — A41.9 SEPSIS (HCC): Status: ACTIVE | Noted: 2023-12-15

## 2023-12-15 PROBLEM — R21 RASH: Status: ACTIVE | Noted: 2023-12-15

## 2023-12-15 LAB
ALBUMIN SERPL BCP-MCNC: 2.2 G/DL (ref 3.5–5)
ALBUMIN SERPL BCP-MCNC: 2.2 G/DL (ref 3.5–5)
ALP SERPL-CCNC: 127 U/L (ref 34–104)
ALP SERPL-CCNC: 127 U/L (ref 34–104)
ALT SERPL W P-5'-P-CCNC: 12 U/L (ref 7–52)
ALT SERPL W P-5'-P-CCNC: 12 U/L (ref 7–52)
ANION GAP SERPL CALCULATED.3IONS-SCNC: 10 MMOL/L
ANION GAP SERPL CALCULATED.3IONS-SCNC: 10 MMOL/L
ANISOCYTOSIS BLD QL SMEAR: PRESENT
ANISOCYTOSIS BLD QL SMEAR: PRESENT
APTT PPP: 36 SECONDS (ref 23–37)
APTT PPP: 36 SECONDS (ref 23–37)
AST SERPL W P-5'-P-CCNC: 28 U/L (ref 13–39)
AST SERPL W P-5'-P-CCNC: 28 U/L (ref 13–39)
ATRIAL RATE: 80 BPM
ATRIAL RATE: 80 BPM
BACTERIA UR QL AUTO: ABNORMAL /HPF
BACTERIA UR QL AUTO: ABNORMAL /HPF
BASOPHILS # BLD MANUAL: 0 THOUSAND/UL (ref 0–0.1)
BASOPHILS # BLD MANUAL: 0 THOUSAND/UL (ref 0–0.1)
BASOPHILS NFR MAR MANUAL: 0 % (ref 0–1)
BASOPHILS NFR MAR MANUAL: 0 % (ref 0–1)
BILIRUB SERPL-MCNC: 1.28 MG/DL (ref 0.2–1)
BILIRUB SERPL-MCNC: 1.28 MG/DL (ref 0.2–1)
BILIRUB UR QL STRIP: NEGATIVE
BILIRUB UR QL STRIP: NEGATIVE
BUN SERPL-MCNC: 34 MG/DL (ref 5–25)
BUN SERPL-MCNC: 34 MG/DL (ref 5–25)
CALCIUM ALBUM COR SERPL-MCNC: 9.1 MG/DL (ref 8.3–10.1)
CALCIUM ALBUM COR SERPL-MCNC: 9.1 MG/DL (ref 8.3–10.1)
CALCIUM SERPL-MCNC: 7.7 MG/DL (ref 8.4–10.2)
CALCIUM SERPL-MCNC: 7.7 MG/DL (ref 8.4–10.2)
CHLORIDE SERPL-SCNC: 100 MMOL/L (ref 96–108)
CHLORIDE SERPL-SCNC: 100 MMOL/L (ref 96–108)
CLARITY UR: CLEAR
CLARITY UR: CLEAR
CO2 SERPL-SCNC: 21 MMOL/L (ref 21–32)
CO2 SERPL-SCNC: 21 MMOL/L (ref 21–32)
COLOR UR: YELLOW
COLOR UR: YELLOW
CREAT SERPL-MCNC: 0.96 MG/DL (ref 0.6–1.3)
CREAT SERPL-MCNC: 0.96 MG/DL (ref 0.6–1.3)
EOSINOPHIL # BLD MANUAL: 0 THOUSAND/UL (ref 0–0.4)
EOSINOPHIL # BLD MANUAL: 0 THOUSAND/UL (ref 0–0.4)
EOSINOPHIL NFR BLD MANUAL: 0 % (ref 0–6)
EOSINOPHIL NFR BLD MANUAL: 0 % (ref 0–6)
ERYTHROCYTE [DISTWIDTH] IN BLOOD BY AUTOMATED COUNT: 16.6 % (ref 11.6–15.1)
ERYTHROCYTE [DISTWIDTH] IN BLOOD BY AUTOMATED COUNT: 16.6 % (ref 11.6–15.1)
GFR SERPL CREATININE-BSD FRML MDRD: 53 ML/MIN/1.73SQ M
GFR SERPL CREATININE-BSD FRML MDRD: 53 ML/MIN/1.73SQ M
GLUCOSE SERPL-MCNC: 117 MG/DL (ref 65–140)
GLUCOSE SERPL-MCNC: 117 MG/DL (ref 65–140)
GLUCOSE UR STRIP-MCNC: NEGATIVE MG/DL
GLUCOSE UR STRIP-MCNC: NEGATIVE MG/DL
HCT VFR BLD AUTO: 32.6 % (ref 34.8–46.1)
HCT VFR BLD AUTO: 32.6 % (ref 34.8–46.1)
HGB BLD-MCNC: 10.2 G/DL (ref 11.5–15.4)
HGB BLD-MCNC: 10.2 G/DL (ref 11.5–15.4)
HGB UR QL STRIP.AUTO: ABNORMAL
HGB UR QL STRIP.AUTO: ABNORMAL
INR PPP: 2.09 (ref 0.84–1.19)
INR PPP: 2.09 (ref 0.84–1.19)
KETONES UR STRIP-MCNC: NEGATIVE MG/DL
KETONES UR STRIP-MCNC: NEGATIVE MG/DL
LEUKOCYTE ESTERASE UR QL STRIP: NEGATIVE
LEUKOCYTE ESTERASE UR QL STRIP: NEGATIVE
LYMPHOCYTES # BLD AUTO: 0.93 THOUSAND/UL (ref 0.6–4.47)
LYMPHOCYTES # BLD AUTO: 0.93 THOUSAND/UL (ref 0.6–4.47)
LYMPHOCYTES # BLD AUTO: 2 % (ref 14–44)
LYMPHOCYTES # BLD AUTO: 2 % (ref 14–44)
MAGNESIUM SERPL-MCNC: 1.9 MG/DL (ref 1.9–2.7)
MAGNESIUM SERPL-MCNC: 1.9 MG/DL (ref 1.9–2.7)
MCH RBC QN AUTO: 27.4 PG (ref 26.8–34.3)
MCH RBC QN AUTO: 27.4 PG (ref 26.8–34.3)
MCHC RBC AUTO-ENTMCNC: 31.3 G/DL (ref 31.4–37.4)
MCHC RBC AUTO-ENTMCNC: 31.3 G/DL (ref 31.4–37.4)
MCV RBC AUTO: 88 FL (ref 82–98)
MCV RBC AUTO: 88 FL (ref 82–98)
MONOCYTES # BLD AUTO: 0.93 THOUSAND/UL (ref 0–1.22)
MONOCYTES # BLD AUTO: 0.93 THOUSAND/UL (ref 0–1.22)
MONOCYTES NFR BLD: 3 % (ref 4–12)
MONOCYTES NFR BLD: 3 % (ref 4–12)
NEUTROPHILS # BLD MANUAL: 29.04 THOUSAND/UL (ref 1.85–7.62)
NEUTROPHILS # BLD MANUAL: 29.04 THOUSAND/UL (ref 1.85–7.62)
NEUTS BAND NFR BLD MANUAL: 27 % (ref 0–8)
NEUTS BAND NFR BLD MANUAL: 27 % (ref 0–8)
NEUTS SEG NFR BLD AUTO: 67 % (ref 43–75)
NEUTS SEG NFR BLD AUTO: 67 % (ref 43–75)
NITRITE UR QL STRIP: NEGATIVE
NITRITE UR QL STRIP: NEGATIVE
NON-SQ EPI CELLS URNS QL MICRO: ABNORMAL /HPF
NON-SQ EPI CELLS URNS QL MICRO: ABNORMAL /HPF
OVALOCYTES BLD QL SMEAR: PRESENT
OVALOCYTES BLD QL SMEAR: PRESENT
P AXIS: 31 DEGREES
P AXIS: 31 DEGREES
PH UR STRIP.AUTO: 5.5 [PH]
PH UR STRIP.AUTO: 5.5 [PH]
PHOSPHATE SERPL-MCNC: 2.8 MG/DL (ref 2.3–4.1)
PHOSPHATE SERPL-MCNC: 2.8 MG/DL (ref 2.3–4.1)
PLATELET # BLD AUTO: 154 THOUSANDS/UL (ref 149–390)
PLATELET # BLD AUTO: 154 THOUSANDS/UL (ref 149–390)
PLATELET BLD QL SMEAR: ADEQUATE
PLATELET BLD QL SMEAR: ADEQUATE
PMV BLD AUTO: 11.1 FL (ref 8.9–12.7)
PMV BLD AUTO: 11.1 FL (ref 8.9–12.7)
POIKILOCYTOSIS BLD QL SMEAR: PRESENT
POIKILOCYTOSIS BLD QL SMEAR: PRESENT
POLYCHROMASIA BLD QL SMEAR: PRESENT
POLYCHROMASIA BLD QL SMEAR: PRESENT
POTASSIUM SERPL-SCNC: 3.4 MMOL/L (ref 3.5–5.3)
POTASSIUM SERPL-SCNC: 3.4 MMOL/L (ref 3.5–5.3)
PR INTERVAL: 214 MS
PR INTERVAL: 214 MS
PROT SERPL-MCNC: 6.1 G/DL (ref 6.4–8.4)
PROT SERPL-MCNC: 6.1 G/DL (ref 6.4–8.4)
PROT UR STRIP-MCNC: ABNORMAL MG/DL
PROT UR STRIP-MCNC: ABNORMAL MG/DL
PROTHROMBIN TIME: 23.1 SECONDS (ref 11.6–14.5)
PROTHROMBIN TIME: 23.1 SECONDS (ref 11.6–14.5)
QRS AXIS: -20 DEGREES
QRS AXIS: -20 DEGREES
QRSD INTERVAL: 118 MS
QRSD INTERVAL: 118 MS
QT INTERVAL: 460 MS
QT INTERVAL: 460 MS
QTC INTERVAL: 530 MS
QTC INTERVAL: 530 MS
RBC # BLD AUTO: 3.72 MILLION/UL (ref 3.81–5.12)
RBC # BLD AUTO: 3.72 MILLION/UL (ref 3.81–5.12)
RBC #/AREA URNS AUTO: ABNORMAL /HPF
RBC #/AREA URNS AUTO: ABNORMAL /HPF
RBC MORPH BLD: PRESENT
RBC MORPH BLD: PRESENT
SCHISTOCYTES BLD QL SMEAR: PRESENT
SCHISTOCYTES BLD QL SMEAR: PRESENT
SODIUM SERPL-SCNC: 131 MMOL/L (ref 135–147)
SODIUM SERPL-SCNC: 131 MMOL/L (ref 135–147)
SP GR UR STRIP.AUTO: 1.03 (ref 1–1.03)
SP GR UR STRIP.AUTO: 1.03 (ref 1–1.03)
T WAVE AXIS: 33 DEGREES
T WAVE AXIS: 33 DEGREES
UROBILINOGEN UR STRIP-ACNC: 6 MG/DL
UROBILINOGEN UR STRIP-ACNC: 6 MG/DL
VARIANT LYMPHS # BLD AUTO: 1 %
VARIANT LYMPHS # BLD AUTO: 1 %
VENTRICULAR RATE: 80 BPM
VENTRICULAR RATE: 80 BPM
WBC # BLD AUTO: 30.89 THOUSAND/UL (ref 4.31–10.16)
WBC # BLD AUTO: 30.89 THOUSAND/UL (ref 4.31–10.16)
WBC #/AREA URNS AUTO: ABNORMAL /HPF
WBC #/AREA URNS AUTO: ABNORMAL /HPF

## 2023-12-15 PROCEDURE — 83735 ASSAY OF MAGNESIUM: CPT | Performed by: INTERNAL MEDICINE

## 2023-12-15 PROCEDURE — 99233 SBSQ HOSP IP/OBS HIGH 50: CPT | Performed by: PSYCHIATRY & NEUROLOGY

## 2023-12-15 PROCEDURE — 99222 1ST HOSP IP/OBS MODERATE 55: CPT | Performed by: SURGERY

## 2023-12-15 PROCEDURE — 85007 BL SMEAR W/DIFF WBC COUNT: CPT | Performed by: INTERNAL MEDICINE

## 2023-12-15 PROCEDURE — 85027 COMPLETE CBC AUTOMATED: CPT | Performed by: INTERNAL MEDICINE

## 2023-12-15 PROCEDURE — 97167 OT EVAL HIGH COMPLEX 60 MIN: CPT

## 2023-12-15 PROCEDURE — 99232 SBSQ HOSP IP/OBS MODERATE 35: CPT | Performed by: STUDENT IN AN ORGANIZED HEALTH CARE EDUCATION/TRAINING PROGRAM

## 2023-12-15 PROCEDURE — 97163 PT EVAL HIGH COMPLEX 45 MIN: CPT

## 2023-12-15 PROCEDURE — 99223 1ST HOSP IP/OBS HIGH 75: CPT | Performed by: INTERNAL MEDICINE

## 2023-12-15 PROCEDURE — 85730 THROMBOPLASTIN TIME PARTIAL: CPT | Performed by: INTERNAL MEDICINE

## 2023-12-15 PROCEDURE — 87081 CULTURE SCREEN ONLY: CPT | Performed by: INTERNAL MEDICINE

## 2023-12-15 PROCEDURE — 80053 COMPREHEN METABOLIC PANEL: CPT | Performed by: INTERNAL MEDICINE

## 2023-12-15 PROCEDURE — 84100 ASSAY OF PHOSPHORUS: CPT | Performed by: INTERNAL MEDICINE

## 2023-12-15 PROCEDURE — 87147 CULTURE TYPE IMMUNOLOGIC: CPT | Performed by: INTERNAL MEDICINE

## 2023-12-15 PROCEDURE — 85610 PROTHROMBIN TIME: CPT | Performed by: INTERNAL MEDICINE

## 2023-12-15 PROCEDURE — 81001 URINALYSIS AUTO W/SCOPE: CPT | Performed by: INTERNAL MEDICINE

## 2023-12-15 RX ORDER — ACETAMINOPHEN 325 MG/1
975 TABLET ORAL EVERY 8 HOURS SCHEDULED
Status: DISCONTINUED | OUTPATIENT
Start: 2023-12-15 | End: 2023-12-21 | Stop reason: HOSPADM

## 2023-12-15 RX ORDER — POTASSIUM CHLORIDE 14.9 MG/ML
20 INJECTION INTRAVENOUS ONCE
Status: COMPLETED | OUTPATIENT
Start: 2023-12-15 | End: 2023-12-15

## 2023-12-15 RX ORDER — FUROSEMIDE 10 MG/ML
20 INJECTION INTRAMUSCULAR; INTRAVENOUS ONCE
Status: COMPLETED | OUTPATIENT
Start: 2023-12-15 | End: 2023-12-15

## 2023-12-15 RX ORDER — POTASSIUM CHLORIDE 20 MEQ/1
40 TABLET, EXTENDED RELEASE ORAL ONCE
Status: DISCONTINUED | OUTPATIENT
Start: 2023-12-15 | End: 2023-12-15

## 2023-12-15 RX ADMIN — METOPROLOL SUCCINATE 100 MG: 100 TABLET, EXTENDED RELEASE ORAL at 10:34

## 2023-12-15 RX ADMIN — ACETAMINOPHEN 975 MG: 325 TABLET, FILM COATED ORAL at 21:20

## 2023-12-15 RX ADMIN — LISINOPRIL 10 MG: 10 TABLET ORAL at 10:32

## 2023-12-15 RX ADMIN — Medication 2 G: at 11:08

## 2023-12-15 RX ADMIN — ACETAMINOPHEN 975 MG: 325 TABLET, FILM COATED ORAL at 16:19

## 2023-12-15 RX ADMIN — APIXABAN 5 MG: 5 TABLET, FILM COATED ORAL at 17:58

## 2023-12-15 RX ADMIN — APIXABAN 2.5 MG: 2.5 TABLET, FILM COATED ORAL at 00:22

## 2023-12-15 RX ADMIN — Medication 2 G: at 17:59

## 2023-12-15 RX ADMIN — ACETAMINOPHEN 650 MG: 325 TABLET, FILM COATED ORAL at 00:42

## 2023-12-15 RX ADMIN — LIDOCAINE 5% 1 PATCH: 700 PATCH TOPICAL at 10:34

## 2023-12-15 RX ADMIN — APIXABAN 2.5 MG: 2.5 TABLET, FILM COATED ORAL at 10:33

## 2023-12-15 RX ADMIN — LATANOPROST 1 DROP: 50 SOLUTION/ DROPS OPHTHALMIC at 00:18

## 2023-12-15 RX ADMIN — ACETAMINOPHEN 650 MG: 325 TABLET, FILM COATED ORAL at 11:06

## 2023-12-15 RX ADMIN — VANCOMYCIN HYDROCHLORIDE 750 MG: 750 INJECTION, SOLUTION INTRAVENOUS at 10:42

## 2023-12-15 RX ADMIN — Medication 2 G: at 21:21

## 2023-12-15 RX ADMIN — FUROSEMIDE 20 MG: 10 INJECTION, SOLUTION INTRAMUSCULAR; INTRAVENOUS at 15:51

## 2023-12-15 RX ADMIN — Medication 1 APPLICATION: at 11:08

## 2023-12-15 RX ADMIN — AMLODIPINE BESYLATE 5 MG: 5 TABLET ORAL at 10:32

## 2023-12-15 RX ADMIN — Medication 2.5 MG: at 16:19

## 2023-12-15 RX ADMIN — VANCOMYCIN HYDROCHLORIDE 750 MG: 750 INJECTION, SOLUTION INTRAVENOUS at 21:19

## 2023-12-15 RX ADMIN — POTASSIUM CHLORIDE 20 MEQ: 14.9 INJECTION, SOLUTION INTRAVENOUS at 15:52

## 2023-12-15 RX ADMIN — POTASSIUM CHLORIDE 20 MEQ: 14.9 INJECTION, SOLUTION INTRAVENOUS at 10:42

## 2023-12-15 RX ADMIN — AMIODARONE HYDROCHLORIDE 200 MG: 200 TABLET ORAL at 10:33

## 2023-12-15 RX ADMIN — ATORVASTATIN CALCIUM 10 MG: 10 TABLET, FILM COATED ORAL at 10:32

## 2023-12-15 RX ADMIN — FERROUS SULFATE TAB 325 MG (65 MG ELEMENTAL FE) 325 MG: 325 (65 FE) TAB at 00:22

## 2023-12-15 NOTE — ASSESSMENT & PLAN NOTE
See patient's plans as noted above.  Neurochecks per protocol.  Neurology continues to follow patient.

## 2023-12-15 NOTE — DISCHARGE INSTR - OTHER ORDERS
Skin Care Plan:  1-Mid Sacro-Buttocks Wound: Cleanse with NSS and pat dry. Lightly pack wound bed and undermining with Aquacel Ag Ribbon (from storeroom), securing end to germania-wound skin with minimal tape. Cover with a Mepilex Silicone Bordered Foam Dressing. Brian with T for Treatment and change daily or PRN soilage/displacement.   2-Turn/reposition q2h or when medically stable for pressure re-distribution on skin .  3-Elevate heels to offload pressure.  4-Moisturize skin daily with skin nourishing cream  5-P-500 Specialty Mattress ordered for patient   6- Left Posterior Upper Arm & Right Back Wounds: Cleanse with NSS and pat dry. Apply Xeroform to wound beds only. Cover with Mepilex Silicone Bordered Foam Dressings. Brian with T for Treatment and change every other day or PRN soilage/displacement.   7-Cleanse B/L Heels with soap and water. Pat dry. Apply Silicone Border Foam (Mepilex) to areas. Brian with P for Prevention and change every 3 days or PRN soilage/displacement. Peel back and inspect Q-shift.

## 2023-12-15 NOTE — PROGRESS NOTES
Lucero Booth is a 88 y.o. female who is currently ordered Vancomycin IV with management by the Pharmacy Consult service.  Relevant clinical data and objective / subjective history reviewed.  Vancomycin Assessment:  Indication and Goal AUC/Trough: Urinary tract infection (goal -600, trough >10); Soft tissue (goal -600, trough >10), -600, trough >10  Clinical Status: stable  Micro:     Renal Function:  SCr: 0.96 mg/dL  CrCl: 47.9 mL/min  Renal replacement: Not on dialysis  Days of Therapy: 2  Current Dose: 1500 mg IV load; 750 mg IV Q12H  Vancomycin Plan:  New Dosinmg q12hrs   Estimated AUC:  513 mcg*hr/mL  Estimated Trough: 18.1 mcg/mL  Next Level:  0600, anticipate dose decrease  Renal Function Monitoring: Daily BMP and UOP  Pharmacy will continue to follow closely for s/sx of nephrotoxicity, infusion reactions and appropriateness of therapy.  BMP and CBC will be ordered per protocol. We will continue to follow the patient’s culture results and clinical progress daily.    Linda Davila, Pharmacist

## 2023-12-15 NOTE — OCCUPATIONAL THERAPY NOTE
"    Occupational Therapy Evaluation     Patient Name: Lucero Booth  Today's Date: 12/15/2023  Problem List  Active Problems:    Stroke-like symptoms    Acute metabolic encephalopathy    Stage IV decubitus ulcer (HCC)    Polypharmacy    Acute kidney insufficiency    Degenerative disc disease, thoracic    Ambulatory dysfunction    Primary hypertension    Major depressive disorder, recurrent, in remission, unspecified (HCC)    Primary insomnia    Acquired hypothyroidism    Elevated troponin    Past Medical History  No past medical history on file.  Past Surgical History  No past surgical history on file.       12/15/23 1005   OT Last Visit   OT Visit Date 12/15/23   Note Type   Note type Evaluation   Pain Assessment   Pain Assessment Tool 0-10  (\"1000\")   Pain Location/Orientation Location: Back   Hospital Pain Intervention(s) Repositioned   Restrictions/Precautions   Weight Bearing Precautions Per Order No   Home Living   Type of Home Assisted living   Prior Function   Level of [place] Needs assistance with ADLs;Needs assistance with functional mobility   Lives With Facility staff   Lifestyle   Autonomy Per chart, pt hasn't walked in over a year, but was participating in stand pivot transfers with Summer during recent hospitalization.  Requires assist with ADLs PTA   Reciprocal Relationships facility staff   ADL   Eating Assistance 1  Total Assistance   Grooming Assistance 1  Total Assistance   UB Bathing Assistance 1  Total Assistance   LB Bathing Assistance 1  Total Assistance   UB Dressing Assistance 1  Total Assistance   LB Dressing Assistance 1  Total Assistance   Toileting Assistance  1  Total Assistance   Additional Comments Pt able to grasp fork with difficulty with R UE, however unable to maintain effective grasp and also unable to bring to mouth   Bed Mobility   Supine to Sit 2  Maximal assistance   Additional items Assist x 2;Increased time required;Verbal cues;LE management   Sit to Supine 2  Maximal " assistance   Additional items Assist x 2;Increased time required;Verbal cues;LE management   Additional Comments Sits EOB for ~3 min with Summer.  C/o severe back pain and refusing to trial standing   Transfers   Additional Comments pt refuses   Balance   Static Sitting Fair -   Activity Tolerance   Activity Tolerance Patient limited by pain   Medical Staff Made Aware PT Peterson   Nurse Made Aware pt cleared by RN for OT evaluation and OOB mobility   RUE Assessment   RUE Assessment X   LUE Assessment   LUE Assessment X   Psychosocial   Psychosocial (WDL) X   Patient Behaviors/Mood Despairing   Cognition   Overall Cognitive Status Impaired   Arousal/Participation Alert;Responsive   Attention Difficulty attending to directions   Orientation Level Oriented to person;Disoriented to place;Disoriented to time;Disoriented to situation   Memory Decreased recall of precautions;Decreased recall of recent events;Decreased short term memory;Decreased recall of biographical information;Decreased long term memory   Following Commands Follows one step commands with increased time or repetition   Assessment   Limitation Decreased ADL status;Decreased UE strength;Decreased UE ROM;Decreased Safe judgement during ADL;Decreased endurance;Decreased cognition;Decreased fine motor control;Decreased self-care trans;Decreased high-level ADLs   Prognosis Fair   Assessment Pt is an 88 year old female seen for initial OT evaluation s/p admission to [facility] with slurred speech, R facial droop, generalized weakness.  CT head (-), MRI pending.  Pt dx'd with acute metabolic encephalopathy  Additional active problems include: polypharmacy, stage IV decubitus ulcer, CHRISTY, degenerative disc disease, ambulatory dysfunction, primary insomnia, elevated troponin, acquired hypothyroidism, major depressive disorder, and primary HTN.  Pt is a poor historian due to cognitive deficits and info obtained from chart.  Pt resides at an assisted living facility.  Per  chart, pt hasn't walked in over a year, but was participating in stand pivot transfers with Summer during recent hospitalization.  Requires assist with ADLs PTA.  Pt is currently demonstrating the following occupational deficits: ADLs with totalA, bed mobility with maxAx2.  Factors currently limiting pt's in these areas include: cognitive deficits, generalized weakness, generalized pain, endurance, balance, functional mobility and unknown level of support available in home environment.  From an OT standpoint, recommend level 2 rehab resources upon d/c.  Pt is to continue to benefit from skilled occupational therapy services while in the hospital to maximize functioning and independence with daily activities.  See below for OT goals to be addressed 2-3x/wk.   Goals   Patient Goals to be in less pain   Plan   Treatment Interventions ADL retraining;Functional transfer training;UE strengthening/ROM;Endurance training;Cognitive reorientation;Patient/family training;Equipment evaluation/education;Compensatory technique education;Continued evaluation;Activityengagement;Fine motor coordination activities;Neuromuscular reeducation   Goal Expiration Date 12/25/23   OT Treatment Day 1   OT Frequency 2-3x/wk   Discharge Recommendation   Recommendation (S)  Geriatric Consult   Rehab Resource Intensity Level, OT II (Moderate Resource Intensity)   AM-PAC Daily Activity Inpatient   Lower Body Dressing 1   Bathing 1   Toileting 1   Upper Body Dressing 1   Grooming 1   Eating 1   Daily Activity Raw Score 6   Turning Head Towards Sound 4   Follow Simple Instructions 3   Low Function Daily Activity Raw Score 13   Low Function Daily Activity Standardized Score  23.16   AM-PAC Applied Cognition Inpatient   Following a Speech/Presentation 1   Understanding Ordinary Conversation 2   Taking Medications 1   Remembering Where Things Are Placed or Put Away 1   Remembering List of 4-5 Errands 1   Taking Care of Complicated Tasks 1   Applied  Cognition Raw Score 7   Applied Cognition Standardized Score 15.17   Barthel Index   Feeding 0   Bathing 0   Grooming Score 0   Dressing Score 0   Bladder Score 0   Bowels Score 0   Toilet Use Score 0   Transfers (Bed/Chair) Score 5   Mobility (Level Surface) Score 0   Stairs Score 0   Barthel Index Score 5       Goals:  Pt will be alert and oriented x at least 2 for at least 75% of sessions.  Pt will complete UB ADLs with modA with use of DME/AD as appropriate.  Pt will maintain sitting unsupported with close supervision for at least 10 min in preparation for completing daily activities seated.  Pt will complete bed mobility with modAx1      Yue Reyes, MOT, OTR/L, CSRS, CBIS  [place] License [id number]  [place] License [id number]

## 2023-12-15 NOTE — PROGRESS NOTES
Lucero Booth is a 88 y.o. female who is currently ordered Vancomycin IV with management by the Pharmacy Consult service.  Relevant clinical data and objective / subjective history reviewed.  Vancomycin Assessment:  Indication and Goal AUC/Trough: Urinary tract infection (goal -600, trough >10); Soft tissue (goal -600, trough >10), -600, trough >10  Clinical Status: stable  Micro:   Blood cultures no results; negative for Covid, RSV, influenza  Renal Function:  SCr: 0.9 mg/dL  CrCl: 50.6 mL/min  Renal replacement: Not on dialysis  Days of Therapy: 1  Current Dose: 1500 mg IV load; 750 mg IV Q12H  Vancomycin Plan:  New Dosing:    Estimated AUC: 492 mcg*hr/mL  Estimated Trough: 17.2 mcg/mL  Next Level: 12- at 0600  Renal Function Monitoring: Daily BMP and UOP  Pharmacy will continue to follow closely for s/sx of nephrotoxicity, infusion reactions and appropriateness of therapy.  BMP and CBC will be ordered per protocol. We will continue to follow the patient’s culture results and clinical progress daily.    Kwasi Bower, Pharmacist

## 2023-12-15 NOTE — ASSESSMENT & PLAN NOTE
Physical and Occupational Therapy consults with case management.  Start oxycodone 2.5 mg as needed given significant pain

## 2023-12-15 NOTE — ASSESSMENT & PLAN NOTE
Patient is on multiple medications including pain medications, sedatives, antidepressants.  Will put most of these on hold.  Patient's daughter is in the room and has been advised regarding discontinuation of these medications so that we can fully assess improvement of her mental status.

## 2023-12-15 NOTE — PLAN OF CARE
Problem: OCCUPATIONAL THERAPY ADULT  Goal: Performs self-care activities at highest level of function for planned discharge setting.  See evaluation for individualized goals.  Description: Treatment Interventions: ADL retraining, Functional transfer training, UE strengthening/ROM, Endurance training, Cognitive reorientation, Patient/family training, Equipment evaluation/education, Compensatory technique education, Continued evaluation, Activityengagement, Fine motor coordination activities, Neuromuscular reeducation          See flowsheet documentation for full assessment, interventions and recommendations.   Note: Limitation: Decreased ADL status, Decreased UE strength, Decreased UE ROM, Decreased Safe judgement during ADL, Decreased endurance, Decreased cognition, Decreased fine motor control, Decreased self-care trans, Decreased high-level ADLs  Prognosis: Fair  Assessment: Pt is an 88 year old female seen for initial OT evaluation s/p admission to [facility] with slurred speech, R facial droop, generalized weakness.  CT head (-), MRI pending.  Pt dx'd with acute metabolic encephalopathy  Additional active problems include: polypharmacy, stage IV decubitus ulcer, CHRISTY, degenerative disc disease, ambulatory dysfunction, primary insomnia, elevated troponin, acquired hypothyroidism, major depressive disorder, and primary HTN.  Pt is a poor historian due to cognitive deficits and info obtained from chart.  Pt resides at an assisted living facility.  Per chart, pt hasn't walked in over a year, but was participating in stand pivot transfers with Summer during recent hospitalization.  Requires assist with ADLs PTA.  Pt is currently demonstrating the following occupational deficits: ADLs with totalA, bed mobility with maxAx2.  Factors currently limiting pt's in these areas include: cognitive deficits, generalized weakness, generalized pain, endurance, balance, functional mobility and unknown level of support available in home  environment.  From an OT standpoint, recommend level 2 rehab resources upon d/c.  Pt is to continue to benefit from skilled occupational therapy services while in the hospital to maximize functioning and independence with daily activities.  See below for OT goals to be addressed 2-3x/wk.  Recommendation: (S) Geriatric Consult  Rehab Resource Intensity Level, OT: II (Moderate Resource Intensity)

## 2023-12-15 NOTE — ASSESSMENT & PLAN NOTE
Patient is unable to walk however is able to at least to pivot and stand from bed.  Physical and Occupational Therapy consult, case management.

## 2023-12-15 NOTE — ASSESSMENT & PLAN NOTE
Initially presenting with strokelike symptoms with right-sided facial droop and generalized weakness with slurred speech.    CT head and CTA head and neck unremarkable for any acute stroke  MRI deferred by neurology  Neurology suspects metabolic encephalopathy in setting of sepsis  Holding home Haldol and Xanax at this time given encephalopathy  Monitor off IVF as patient has new oxygen requirements after fluid resuscitation on presentation  See additional management per sepsis A/P

## 2023-12-15 NOTE — PLAN OF CARE
Problem: PHYSICAL THERAPY ADULT  Goal: Performs mobility at highest level of function for planned discharge setting.  See evaluation for individualized goals.  Description: Treatment/Interventions: LE strengthening/ROM, Therapeutic exercise, Functional transfer training, Endurance training, Cognitive reorientation, Patient/family training, Equipment eval/education, Bed mobility, Gait training, Spoke to nursing, Spoke to case management, OT  Equipment Recommended:  (TBD)       See flowsheet documentation for full assessment, interventions and recommendations.  Note: Prognosis: Poor  Problem List: Decreased strength, Decreased endurance, Impaired balance, Decreased mobility, Decreased coordination, Decreased cognition, Impaired judgement, Decreased safety awareness, Orthopedic restrictions, Pain  Assessment: Pt is a 88 y.o. female [facility] with stroke like symptoms R facial droop, slurred speech, and generalized weakness. Pt has the following comorbidities which affect their treatment: active problems including acute metabolic encephalopathy, stroke like symptoms, polypharmacy, Stage 4 sacral ulcer, acute kidney insufficiency, thoracic DDD, ambulatory dysfunction, primary insomnia, elevated troponin, acquired hypothyroidism, major depressive disorder, HTN,  as well as personal factors including relying on caregiver at baseline. Pt has a high complexity clinical presentation due to Ongoing medical management for primary dx, Increased reliance on more restrictive AD compared to baseline, Decreased activity tolerance compared to baseline, Fall risk, Increased assistance needed from caregiver at current time, Ongoing telemetry monitoring, Cog status, Trending lab values, Diagnostic imaging pending, Continuous pulse oximetry monitoring  , and PMH. PT was consulted to evaluate pt's functional mobility and discharge needs. Upon evaluation, patient required maxAx2 for bed mobility. Body system  impairments include impaired overall strength and endurance, impaired cognition, pain. Pt's functional activity impairments include: impaired mobility and activity tolerance. Participation restrictions include inability to safely access home environment and community. At conclusion of eval, pt remained quarter turned in bed with bed alarm donned, phone, call bell, and all other personal needs within reach. Pt would benefit from skilled PT to address their functional mobility limitations. The patient's AM-PAC Basic Mobility Inpatient Short Form Raw Score is 6. A Raw score of less than or equal to 16 suggests the patient may benefit from discharge to post-acute rehabilitation services. Please also refer to the recommendation of the Physical Therapist for safe discharge planning.  Barriers to Discharge: Inaccessible home environment, Decreased caregiver support     Rehab Resource Intensity Level, PT: II (Moderate Resource Intensity)    See flowsheet documentation for full assessment.

## 2023-12-15 NOTE — ASSESSMENT & PLAN NOTE
Initially presenting with strokelike symptoms with right-sided facial droop and generalized weakness with slurred speech.  Neurology has been consulted.  MRI is ordered and pending.  CTA unremarkable.  Currently referred to medicine due to complex presentation and not totally convincing of stroke diagnosis  At this point patient presents more like acute metabolic encephalopathy for multiple causes including infection (source is possibly urine and/or stage IV decubitus sacral ulcer), polypharmacy and acute kidney insufficiency/dehydration.  Will discontinue any psychiatric/mind altering medications including narcotics and sedatives.  Patient would need fluids at this point.  Discontinue Lasix.  Neurology to continue following patient while in hospital.  As mentioned, MRI pending.  Will treat infection sources currently placed on cefepime with vancomycin.  Will have physical and Occupational Therapy consult as patient also has ambulatory dysfunction with case management.  Will follow patient's neurologic functioning.  Recheck metabolic profile with CBC in the morning.

## 2023-12-15 NOTE — PLAN OF CARE
Problem: NEUROSENSORY - ADULT  Goal: Achieves stable or improved neurological status  Description: INTERVENTIONS  - Monitor and report changes in neurological status  - Monitor vital signs such as temperature, blood pressure, glucose, and any other labs ordered   - Initiate measures to prevent increased intracranial pressure  - Monitor for seizure activity and implement precautions if appropriate      Outcome: Progressing  Goal: Remains free of injury related to seizures activity  Description: INTERVENTIONS  - Maintain airway, patient safety  and administer oxygen as ordered  - Monitor patient for seizure activity, document and report duration and description of seizure to physician/advanced practitioner  - If seizure occurs,  ensure patient safety during seizure  - Reorient patient post seizure  - Seizure pads on all 4 side rails  - Instruct patient/family to notify RN of any seizure activity including if an aura is experienced  - Instruct patient/family to call for assistance with activity based on nursing assessment  - Administer anti-seizure medications if ordered    Outcome: Progressing  Goal: Achieves maximal functionality and self care  Description: INTERVENTIONS  - Monitor swallowing and airway patency with patient fatigue and changes in neurological status  - Encourage and assist patient to increase activity and self care.   - Encourage visually impaired, hearing impaired and aphasic patients to use assistive/communication devices  Outcome: Progressing        Problem: CARDIOVASCULAR - ADULT  Goal: Maintains optimal cardiac output and hemodynamic stability  Description: INTERVENTIONS:  - Monitor I/O, vital signs and rhythm  - Monitor for S/S and trends of decreased cardiac output  - Administer and titrate ordered vasoactive medications to optimize hemodynamic stability  - Assess quality of pulses, skin color and temperature  - Assess for signs of decreased coronary artery perfusion  - Instruct patient to  report change in severity of symptoms  Outcome: Progressing  Goal: Absence of cardiac dysrhythmias or at baseline rhythm  Description: INTERVENTIONS:  - Continuous cardiac monitoring, vital signs, obtain 12 lead EKG if ordered  - Administer antiarrhythmic and heart rate control medications as ordered  - Monitor electrolytes and administer replacement therapy as ordered  Outcome: Progressing         Problem: RESPIRATORY - ADULT  Goal: Achieves optimal ventilation and oxygenation  Description: INTERVENTIONS:  - Assess for changes in respiratory status  - Assess for changes in mentation and behavior  - Position to facilitate oxygenation and minimize respiratory effort  - Oxygen administered by appropriate delivery if ordered  - Initiate smoking cessation education as indicated  - Encourage broncho-pulmonary hygiene including cough, deep breathe, Incentive Spirometry  - Assess the need for suctioning and aspirate as needed  - Assess and instruct to report SOB or any respiratory difficulty  - Respiratory Therapy support as indicated  Outcome: Progressing      Problem: Prexisting or High Potential for Compromised Skin Integrity  Goal: Skin integrity is maintained or improved  Description: INTERVENTIONS:  - Identify patients at risk for skin breakdown  - Assess and monitor skin integrity  - Assess and monitor nutrition and hydration status  - Monitor labs   - Assess for incontinence   - Turn and reposition patient  - Assist with mobility/ambulation  - Relieve pressure over bony prominences  - Avoid friction and shearing  - Provide appropriate hygiene as needed including keeping skin clean and dry  - Evaluate need for skin moisturizer/barrier cream  - Collaborate with interdisciplinary team   - Patient/family teaching  - Consider wound care consult   Outcome: Progressing         Problem: INFECTION - ADULT  Goal: Absence or prevention of progression during hospitalization  Description: INTERVENTIONS:  - Assess and monitor  for signs and symptoms of infection  - Monitor lab/diagnostic results  - Monitor all insertion sites, i.e. indwelling lines, tubes, and drains  - Monitor endotracheal if appropriate and nasal secretions for changes in amount and color  - [place] appropriate cooling/warming therapies per order  - Administer medications as ordered  - Instruct and encourage patient and family to use good hand hygiene technique  - Identify and instruct in appropriate isolation precautions for identified infection/condition  Outcome: Progressing     Problem: INFECTION - ADULT  Goal: Absence of fever/infection during neutropenic period  Description: INTERVENTIONS:  - Monitor WBC    Outcome: Progressing     Problem: SAFETY ADULT  Goal: Patient will remain free of falls  Description: INTERVENTIONS:  - Educate patient/family on patient safety including physical limitations  - Instruct patient to call for assistance with activity   - Consult OT/PT to assist with strengthening/mobility   - Keep Call bell within reach  - Keep bed low and locked with side rails adjusted as appropriate  - Keep care items and personal belongings within reach  - Initiate and maintain comfort rounds  - Make Fall Risk Sign visible to staff  - Offer Toileting every 2 Hours, in advance of need  - Initiate/Maintain bed alarm  - Obtain necessary fall risk management equipment:   - Apply yellow socks and bracelet for high fall risk patients  - Consider moving patient to room near nurses station  Outcome: Progressing

## 2023-12-15 NOTE — UTILIZATION REVIEW
Initial Clinical Review    Admission: Date/Time/Statement:   Admission Orders (From admission, onward)       Ordered        12/14/23 2114  Inpatient Admission  Once                          Orders Placed This Encounter   Procedures    Inpatient Admission     Standing Status:   Standing     Number of Occurrences:   1     Order Specific Question:   Level of Care     Answer:   Level 2 Stepdown / HOT [14]     Order Specific Question:   Estimated length of stay     Answer:   More than 2 Midnights     Order Specific Question:   Certification     Answer:   I certify that inpatient services are medically necessary for this patient for a duration of greater than two midnights. See H&P and MD Progress Notes for additional information about the patient's course of treatment.     ED Arrival Information       Expected   -    Arrival   12/14/2023 16:55    Acuity   Emergent              Means of arrival   Ambulance    Escorted by   [facility]    Service   Hospitalist    Admission type   Emergency              Arrival complaint   STROKE             Chief Complaint   Patient presents with    STROKE Alert     Pt having R sided facial droop and slurred speech. Unknown LKW.        Initial Presentation: 88 y.o. female presents to ed from assisted living home via ems for evaluation and treatment of right sided facial droop and slurred speech. Unknown : last known well.  PMHX: HTN, AFIB    Clinical assessment significant for tachypnea, pain 9/10.  NIHSS 7 - BLLE 2/5 and 3/5 strength.  WBC 33.44, , GLUCOSE 150, BUN 35, TROP 91. Imaging shows bilateral pleural effusions, large sacral decubitus ulcer concerning for osteomyelitis, cardiomegaly. EKG : 1st degree A-V block, PACs, prolonged QT.  Initially treated with iv .9% ns bolus , iv ceftriaxone, iv vancomycin, iv cefepime, iv multi electrolyte 100/hr.      Neurology consult :  NIHSS of 7. CTH showed no acute intracranial abnormalities. CTA showed no large vessel  occlusion or acute thrombus but it was a difficult study. Patient not a candidate for TNK given unknown last known well.    Date: 12- 15-23    Day 2: inpatient med surg  96.6 with tachypnea.  Gram positive cocci in pairs and chains. Urine with innumerable bacteria.  Continue iv vancomycin. PT/OT evaluations completed recommending geriatric consult.  Moderate rehab needs identified. Geriatric consult : notes [facility], recommends decreasing xanex,haldol and ativan. Agree with iv antibiotics. Likely needs MRI.        ED Triage Vitals   12/14/23 1930 12/14/23 1705 12/14/23 1705 12/14/23 1705 12/14/23 1705   (!) 97.3 °F (36.3 °C) 77 (!) 23 (!) 138/105 100 %      Oral Monitor         Pain Score       9          12/15/23 102 kg (224 lb 1.6 oz)     Additional Vital Signs:     Date/Time Temp Pulse Resp BP MAP (mmHg) SpO2 Nasal Cannula O2 Flow Rate (L/min)   12/15/23 0800 -- -- -- -- -- -- 4 L/min   12/15/23 07:51:19 96.6 °F (35.9 °C)   Abnormal  70 18 156/72 100 94 % --   12/15/23 05:00:20 97.3 °F (36.3 °C)   Abnormal  69 -- 136/50 79 92 % --   12/15/23 0310 -- -- -- -- -- -- 2 L/min   12/15/23 03:09:46 96.7 °F (35.9 °C)   Abnormal  71 20 141/48   Abnormal  79 92 % --   12/15/23 0309 -- 67 -- 141/48   Abnormal  79 91 % --   12/15/23 0115 -- 78 28 Abnormal  125/63 87 96 % --   12/15/23 0015 -- 84 32 Abnormal  126/62 86 92 % --   12/14/23 2315 -- 68 37 Abnormal  143/73 99 96 % --   12/14/23 2200 -- 68 38 Abnormal  139/61 -- 96 % --   12/14/23 2115 -- 68 36 Abnormal  133/60 -- 94 % --   12/14/23 2100 -- -- -- -- -- -- --   12/14/23 2045 -- 70 40 Abnormal  140/62 -- 94 % --   12/14/23 2030 -- 70 38 Abnormal  139/65 -- 94 % --   12/14/23 2015 -- 68 35 Abnormal  139/65 -- 93 % --   12/14/23 2000 -- 70 36 Abnormal  148/66 -- 94 % --   12/14/23 1945 -- 70 32 Abnormal  147/67 -- 95 % --   12/14/23 1930 97.3 °F (36.3 °C)   Abnormal  70 32 Abnormal  149/65 -- 95 % --   12/14/23 1915 -- 70 32 Abnormal  137/62 -- 95 % --   12/14/23  1900 -- 72 36 Abnormal  140/63 -- 95 % --   12/14/23 1845 -- 72 36 Abnormal  143/65 -- 94 % --   12/14/23 1830 -- 74 24 Abnormal  131/62 -- 95 % --   12/14/23 1815 -- 74 24 Abnormal  136/60 -- 95 % --   12/14/23 1800 -- 76 24 Abnormal  139/63 -- 95 % --   12/14/23 1745 -- 78 22 149/64 -- 96 % --   12/14/23 1740 -- 78 24 Abnormal  148/65 -- 95 % --   12/14/23 1735 -- 78 24 Abnormal  148/65 -- 95 % --   12/14/23 1721 -- 72 24 Abnormal  130/83 101 97 % --   12/14/23 1705 -- 77 23 Abnormal  138/105   Abnormal  -- 100 % --               Pertinent Labs/Diagnostic Test Results:                   CT chest abdomen pelvis wo contrast   Final (12/15 0958)      1.  Moderate bilateral pleural effusions with overlying compressive atelectasis.      2.  Irregularity and sclerosis of the distal sacrum and coccyx underlying the large decubitus ulcer suspicious for osteomyelitis though chronicity is uncertain as there are no prior studies available for comparison. This would be better evaluated with MRI.         3.  Hepatic cirrhosis.      4.  Right ventral hernia containing a nonobstructed loop of transverse colon.         XR chest portable   Final  (12/15 0904)      Mild cardiomegaly.      Vascular congestion.      Layering pleural effusions and bibasilar atelectasis.      Per MRI query; no pacer.            CTA stroke alert (head/neck)   Final (12/14 1757)         1. Technically suboptimal exam. No evidence of large vessel occlusion or acute thrombus in the proximal portions of the major vessels of the [place].   2. No evidence of hemodynamically significant stenosis or occlusion of the carotid or vertebral arteries.   3. Small bilateral pleural effusions and bibasilar atelectasis.         CT stroke alert brain   Final (12/14 1757)      No evidence of acute vascular territorial infarction, intracranial hemorrhage or mass.        Results from last 7 days   Lab Units 12/14/23  1808   SARS-COV-2  Negative     Results from  last 7 days   Lab Units 12/15/23  0447 12/14/23  1917   WBC Thousand/uL 30.89* 33.44*   HEMOGLOBIN g/dL 10.2* 10.6*   HEMATOCRIT % 32.6* 31.6*   PLATELETS Thousands/uL 154 169   BANDS PCT % 27*  --          Results from last 7 days   Lab Units 12/15/23  0447 12/14/23  1808   SODIUM mmol/L 131* 128*   POTASSIUM mmol/L 3.4* 4.5   CHLORIDE mmol/L 100 102   CO2 mmol/L 21 20*   ANION GAP mmol/L 10 6   BUN mg/dL 34* 35*   CREATININE mg/dL 0.96 0.90   EGFR ml/min/1.73sq m 53 58   CALCIUM mg/dL 7.7* 7.1*   MAGNESIUM mg/dL 1.9  --    PHOSPHORUS mg/dL 2.8  --      Results from last 7 days   Lab Units 12/15/23  0447   AST U/L 28   ALT U/L 12   ALK PHOS U/L 127*   TOTAL PROTEIN g/dL 6.1*   ALBUMIN g/dL 2.2*   TOTAL BILIRUBIN mg/dL 1.28*     Results from last 7 days   Lab Units 12/14/23  1708   POC GLUCOSE mg/dl 182*     Results from last 7 days   Lab Units 12/15/23  0447 12/14/23  1808   GLUCOSE RANDOM mg/dL 117 150*     Results from last 7 days   Lab Units 12/14/23 2201 12/14/23 2016 12/14/23  1808   HS TNI 0HR ng/L  --   --  91*   HS TNI 2HR ng/L  --  23  --    HSTNI D2 ng/L  --  -68  --    HS TNI 4HR ng/L 74*  --   --    HSTNI D4 ng/L -17  --   --          Results from last 7 days   Lab Units 12/15/23  0447 12/14/23  1808   PROTIME seconds 23.1* 23.3*   INR  2.09* 2.11*   PTT seconds 36 31     Results from last 7 days   Lab Units 12/14/23 1917   TSH 3RD GENERATON uIU/mL 0.929         Results from last 7 days   Lab Units 12/14/23 2016   LACTIC ACID mmol/L 1.6     Results from last 7 days   Lab Units 12/14/23  2241 12/14/23  1917   CRP mg/L 278.6*  --    SED RATE mm/hour  --  >130*     Results from last 7 days   Lab Units 12/15/23  0047   CLARITY UA  Clear   COLOR UA  Yellow   SPEC GRAV UA  1.035*   PH UA  5.5   GLUCOSE UA mg/dl Negative   KETONES UA mg/dl Negative   BLOOD UA  Trace*   PROTEIN UA mg/dl 30 (1+)*   NITRITE UA  Negative   BILIRUBIN UA  Negative   UROBILINOGEN UA (BE) mg/dl 6.0*   LEUKOCYTES UA  Negative   WBC  UA /hpf 1-2   RBC UA /hpf 2-4*   BACTERIA UA /hpf Innumerable*   EPITHELIAL CELLS WET PREP /hpf Occasional     Results from last 7 days   Lab Units 12/14/23  1808   INFLUENZA A PCR  Negative   INFLUENZA B PCR  Negative   RSV PCR  Negative             Results from last 7 days   Lab Units 12/14/23  1917   ETHANOL LVL mg/dL <10   ACETAMINOPHEN LVL ug/mL <2*   SALICYLATE LVL mg/dL <5     Results from last 7 days   Lab Units 12/14/23 2016   GRAM STAIN RESULT Blood cultures x 2  Gram positive cocci in pairs and chains*    Gram positive cocci in pairs and chains*     2/14/2023 2016 12/15/2023 0629 Blood Culture Identification Panel   (Abnormal)   Blood from Hand, Left    Component Value   Streptococcus Detected Abnormal  P          ED Treatment:   Medication Administration from 12/14/2023 1655 to 12/15/2023 0305         Date/Time Order Dose Route Action     12/14/2023 1915 EST sodium chloride 0.9 % bolus 500 mL 500 mL Intravenous New Bag     12/14/2023 2022 EST sodium chloride 0.9 % bolus 1,000 mL 1,000 mL Intravenous New Bag     12/14/2023 2144 EST vancomycin (VANCOCIN) 1500 mg in sodium chloride 0.9% 250 mL IVPB 1,500 mg Intravenous New Bag     12/14/2023 2022 EST cefepime (MAXIPIME) 2 g/50 mL dextrose IVPB 2,000 mg Intravenous New Bag     12/15/2023 0022 EST apixaban (ELIQUIS) tablet 2.5 mg 2.5 mg Oral Given     12/15/2023 0022 EST ferrous sulfate tablet 325 mg 325 mg Oral Given     12/15/2023 0018 EST latanoprost (XALATAN) 0.005 % ophthalmic solution 1 drop 1 drop Both Eyes Given     12/15/2023 0042 EST acetaminophen (TYLENOL) tablet 650 mg 650 mg Oral Given     12/14/2023 2144 EST multi-electrolyte (PLASMALYTE-A/ISOLYTE-S PH 7.4) IV solution 100 mL/hr Intravenous New Bag          No past medical history on file.    Present on Admission:  NONE       Admitting Diagnosis:     Stroke (HCC) [I63.9]  Stroke-like symptoms [R29.90]    Age/Sex: 88 y.o. female    Scheduled Medications:    amiodarone, 200 mg, Oral,  Daily  amLODIPine, 5 mg, Oral, Daily   And  atorvastatin, 10 mg, Oral, Daily  ammonium lactate, 1 Application, Topical, Daily  apixaban, 5 mg, Oral, BID  Diclofenac Sodium, 2 g, Topical, 4x Daily  ferrous sulfate, 325 mg, Oral, Q48H  Fluticasone Furoate-Vilanterol, 1 puff, Inhalation, Daily  latanoprost, 1 drop, Both Eyes, HS  levothyroxine, 25 mcg, Oral, Early Morning  lidocaine, 1 patch, Topical, Daily  lisinopril, 10 mg, Oral, Daily  metoprolol succinate, 100 mg, Oral, Daily  vancomycin, 750 mg, Intravenous, Q12H      Continuous IV Infusions:     PRN Meds:  acetaminophen, 650 mg, Oral, Q6H PRN  aluminum-magnesium hydroxide-simethicone, 30 mL, Oral, Q6H PRN  docusate sodium, 100 mg, Oral, BID PRN  hyoscyamine, 0.125 mg, Oral, Q6H PRN  ondansetron, 4 mg, Intravenous, Q6H PRN  pseudoephedrine, 60 mg, Oral, Q6H PRN        IP CONSULT TO NEUROLOGY  IP CONSULT TO INFECTIOUS DISEASES  IP CONSULT TO CASE MANAGEMENT  IP CONSULT TO PHARMACY  IP CONSULT TO GERONTOLOGY    Network Utilization Review Department  ATTENTION: Please call with any questions or concerns to [phone] and carefully listen to the prompts so that you are directed to the right person. All voicemails are confidential.   For Discharge needs, contact Care Management DC Support Team at [phone] opt. [phone]  Send all requests for admission clinical reviews, approved or denied determinations and any other requests to dedicated fax number below belonging to the [facility] where the patient is receiving treatment. List of dedicated fax numbers for the Facilities:  FACILITY NAME UR FAX NUMBER   ADMISSION DENIALS (Administrative/Medical Necessity) [phone]   DISCHARGE SUPPORT TEAM (NETWORK) [phone]   PARENT CHILD HEALTH (Maternity/NICU/Pediatrics) [phone]   [facility] [phone]   [facility] [phone]   [facility] [phone]   [facility]  [phone]   [facility] [phone]   [facility] [phone]   [facility] [phone]   [facility] [phone]   [facility] [phone]   [facility] [phone]   [facility] [phone]

## 2023-12-15 NOTE — ASSESSMENT & PLAN NOTE
Streptococcal bacteremia, unclear etiology.  Possible right upper extremity cellulitis as source.  Continue vancomycin  ID following  Repeat blood cultures in a.m.  Check TTE to assess for endocarditis

## 2023-12-15 NOTE — ASSESSMENT & PLAN NOTE
Blood pressure stable  Continue amlodipine 5 mg daily  Continue lisinopril 10 mg daily  Continue Toprol 100

## 2023-12-15 NOTE — CONSULTS
Consultation - Infectious Disease   Lucero Booth 88 y.o. female MRN: [id number]  Unit/Bed#: [facility] 713-01 Encounter: [id number]      IMPRESSION & RECOMMENDATIONS:   Impression/Recommendations:  Sepsis, POA.    WBC, RR.  Due to bacteremia.  No other appreciable source.  UA, flu/RSV/COVID PCR, CT C/A/P otherwise negative.  Rec:  Continue antibiotics as below  Follow temperatures closely  Recheck WBC in AM to monitor infection  Supportive care as per the primary service    Streptococcal bacteremia.    Unclear etiology.  Consider skin source given RUE erythema, multiple wounds as below.  Consider due to recent pseudoaneursym injection early November.  No indwelling intravascular devices.  Consider endocarditis.  Rec:  Continue vancomycin for now  Pharmacy consult for vancomycin dosing  Follow up formal ID to determine possible source and if further work-up indicated  Follow up susceptibilties and tailor antibiotics as indicated  Check repeat blood cultures in AM  Check TTE to assess for endocarditis    RUE rash.    Consider cellulitis  Rec:  Continue antibiotics as above  Serial exams    Left upper arm wound.  Possibly pressure wound due to overall appearance.  No apparent superinfection.  Rec:  LWC per nursing    Chronic sacral decubitus ulcer.  No apparent acute superinfection on photo review.  Consider chronic osteomyelitis given CT findings.  Rec:  LWC per nursing    Acute encephalopathy.  Consider due to sepsis.  CT head, CTA negative.  Rec:  Continue antibiotics as above  Follow mental status closely  Neurology follow-up    Recent L SFA pseudoaneurysm.  Thought iatrogenic from recent Micra pacer placement at OSH.  Status post thrombin injection 11/3/23 with resolution on repeat Doppler.  No noted abnormality on CT A/P this admission although non-contrast.  Rec:  Serial exams    Cirrhosis.  Thought due to ABRAHAM.  T. Bili slightly elevated which may be due to sepsis as above.  CT A/P unremarkable.  Rec:  Check  repeat CMP in AM    Status post spinal fusion.  Recently seen in ED for back pain.  Limited assessment today due to lethargy.  Rec:  Serial exams  Consider MRI if back pain becomes a prodominant symptom    I discussed with Dr. Kramer the above plan to continue IV antibiotics and check TTE.  He agrees with the plan.    Antibiotics:  Vancomycin #1    Thank you for this consultation.  We will follow along with you.    HISTORY OF PRESENT ILLNESS:  Reason for Consult: Bacteremia    HPI: Lucero Booth is a 88 y.o. female who has dementia and is a poor historian so all of the history is obtained through the medical record.  She was brought to the ED yesterday with slurred speech and right-sided facial droop.  Upon presentation was noted to be afebrile but had tachypnea.  Labs revealed leukocytosis.  CT head, CTA negative.  CT C/A/P showed no pneumonia or intra-abdominal abscess.  She was given doses of vancomycin and cefepime.  Her blood cultures have come back growing GPC's in pairs and chains.  We are asked to comment on further evaluation and management.    In performing this consult, I have reviewed prior admission and outpatient visit records in detail.    REVIEW OF SYSTEMS:  Limited due to lethargy    PAST MEDICAL HISTORY:  No past medical history on file.  No past surgical history on file.    FAMILY HISTORY:  Non-contributory    SOCIAL HISTORY:  Social History     Substance and Sexual Activity   Alcohol Use Not on file     Social History     Substance and Sexual Activity   Drug Use Not on file     Social History     Tobacco Use   Smoking Status Not on file   Smokeless Tobacco Not on file       ALLERGIES:  Allergies   Allergen Reactions    Cephalosporins Other (See Comments)     Unknown       MEDICATIONS:  All current active medications have been reviewed, including antibiotics as outlined above.    PHYSICAL EXAM:  Vitals:  Temp:  [96.6 °F (35.9 °C)-97.3 °F (36.3 °C)] 96.6 °F (35.9 °C)  HR:  [67-84] 71  Resp:   [16-40] 16  BP: (125-156)/() 148/67  SpO2:  [91 %-100 %] 96 %  Temp (24hrs), Av °F (36.1 °C), Min:96.6 °F (35.9 °C), Max:97.3 °F (36.3 °C)  Current: Temperature: (!) 96.6 °F (35.9 °C)     Physical Exam:  General:  Well-nourished, well-developed, in no acute distress  Eyes:  Normal lids, eyes closed  Oropharynx:  No ulcers, no lesions  Neck:  Supple, no lymphadenopathy  Lungs:  Normal respiratory excursion, no accessory muscle use  Cardiac:  Regular rate and rhythm, extremities well perfused  Abdomen:  Soft, non-tender, non-distended  Extremities:  Pain with manipulation of arms, blotchy erythema right forearm and lower biceps  Skin:  No rashes, no ulcers  Neurological:  Moves all four extremities spontaneously, sensation grossly intact, lethargic    LABS, IMAGING, & OTHER STUDIES:  Lab Results:  I have personally reviewed pertinent labs.  Results from last 7 days   Lab Units 12/15/23  0447 23  1808   POTASSIUM mmol/L 3.4* 4.5   CHLORIDE mmol/L 100 102   CO2 mmol/L 21 20*   BUN mg/dL 34* 35*   CREATININE mg/dL 0.96 0.90   EGFR ml/min/1.73sq m 53 58   CALCIUM mg/dL 7.7* 7.1*   AST U/L 28  --    ALT U/L 12  --    ALK PHOS U/L 127*  --      Results from last 7 days   Lab Units 12/15/23  0447 23  1917   WBC Thousand/uL 30.89* 33.44*   HEMOGLOBIN g/dL 10.2* 10.6*   PLATELETS Thousands/uL 154 169     Results from last 7 days   Lab Units 23   GRAM STAIN RESULT  Gram positive cocci in pairs and chains*  Gram positive cocci in pairs and chains*       Imaging Studies:   I have personally reviewed the following radiographic images in PACS:  CXR images reviewed personally no visible pacemaker

## 2023-12-15 NOTE — PHYSICAL THERAPY NOTE
Physical Therapy Evaluation    Patient Name: Lucero Booth    Today's Date: 12/15/2023     Problem List  Active Problems:    Stroke-like symptoms    Acute metabolic encephalopathy    Stage IV decubitus ulcer (HCC)    Polypharmacy    Acute kidney insufficiency    Degenerative disc disease, thoracic    Ambulatory dysfunction    Primary hypertension    Major depressive disorder, recurrent, in remission, unspecified (HCC)    Primary insomnia    Acquired hypothyroidism    Elevated troponin       Past Medical History  No past medical history on file.     Past Surgical History  No past surgical history on file.      12/15/23 1006   PT Last Visit   PT Visit Date 12/15/23   Note Type   Note type Evaluation   Pain Assessment   Pain Assessment Tool 0-10   Pain Score 10 - Worst Possible Pain   Pain Location/Orientation Location: Back   Hospital Pain Intervention(s) Repositioned   Restrictions/Precautions   Weight Bearing Precautions Per Order No   Other Precautions Cognitive;Bed Alarm;Multiple lines;Telemetry;Pain;Fall Risk  (h/o thoracic DDD, stage 4 sacral ulcer, R shoulder blade skin tear)   Home Living   Type of Home Assisted living  (SVM)   Home Equipment Walker;Wheelchair-manual   Additional Comments poor historian, info obtained from last PT note in 11/2023 where pt states she ambulated short distances with RW and utilized w/c for remainder of mobility   Prior Function   Level of [place] Needs assistance with ADLs;Needs assistance with functional mobility   Lives With Facility staff   Comments poor historian, need to confirm PLOF   General   Family/Caregiver Present No   Cognition   Overall Cognitive Status Impaired   Arousal/Participation Uncooperative   Attention Difficulty attending to directions   Orientation Level Oriented to person;Disoriented to place;Disoriented to time;Disoriented to situation   Following Commands Unable to follow one step commands    RLE Assessment   RLE Assessment   (unable to assess due to pt cog status)   LLE Assessment   LLE Assessment   (unable to assess due to pt cog status)   Bed Mobility   Rolling R 2  Maximal assistance   Additional items Assist x 2;Increased time required;Verbal cues;LE management   Supine to Sit 2  Maximal assistance   Additional items Assist x 2;Increased time required;Verbal cues;LE management   Sit to Supine 2  Maximal assistance   Additional items Assist x 2;Increased time required;Verbal cues;LE management   Transfers   Sit to Stand Unable to assess   Additional Comments refusal   Balance   Static Sitting Poor +   Endurance Deficit   Endurance Deficit Yes   Endurance Deficit Description pain, fatigue, weakness   Activity Tolerance   Activity Tolerance Patient limited by fatigue;Patient limited by pain   Medical Staff Made Aware LAKESHA Turner   Nurse Made Aware yes-cleared   Assessment   Prognosis Poor   Problem List Decreased strength;Decreased endurance;Impaired balance;Decreased mobility;Decreased coordination;Decreased cognition;Impaired judgement;Decreased safety awareness;Orthopedic restrictions;Pain   Assessment Pt is a 88 y.o. female admitted to \[facility]\"" on 12/14/2023 with stroke like symptoms R facial droop, slurred speech, and generalized weakness. Pt has the following comorbidities which affect their treatment: active problems including acute metabolic encephalopathy, stroke like symptoms, polypharmacy, Stage 4 sacral ulcer, acute kidney insufficiency, thoracic DDD, ambulatory dysfunction, primary insomnia, elevated troponin, acquired hypothyroidism, major depressive disorder, HTN,  as well as personal factors including relying on caregiver at baseline. Pt has a high complexity clinical presentation due to Ongoing medical management for primary dx, Increased reliance on more restrictive AD compared to baseline, Decreased activity tolerance compared to baseline, Fall risk, Increased assistance needed from  caregiver at current time, Ongoing telemetry monitoring, Cog status, Trending lab values, Diagnostic imaging pending, Continuous pulse oximetry monitoring  , and PMH. PT was consulted to evaluate pt's functional mobility and discharge needs. Upon evaluation, patient required maxAx2 for bed mobility. Body system impairments include impaired overall strength and endurance, impaired cognition, pain. Pt's functional activity impairments include: impaired mobility and activity tolerance. Participation restrictions include inability to safely access home environment and community. At conclusion of eval, pt remained quarter turned in bed with bed alarm donned, phone, call bell, and all other personal needs within reach. Pt would benefit from skilled PT to address their functional mobility limitations. The patient's AM-PAC Basic Mobility Inpatient Short Form Raw Score is 6. A Raw score of less than or equal to 16 suggests the patient may benefit from discharge to post-acute rehabilitation services. Please also refer to the recommendation of the Physical Therapist for safe discharge planning.   Barriers to Discharge Inaccessible home environment;Decreased caregiver support   Goals   Patient Goals less pain   STG Expiration Date 12/29/23   Short Term Goal #1 In 14 days, pt will: 1) perform bed mobility with minAx1 to promote functional independence and decrease caregiver burden. 2) perform transfers to<>from all surfaces with minAx1 to promote functional independence and decrease caregiver burden. 3) ambulate 10' with minAx1 and least restrictive device to promote safe return to home. 4) self propel w/c 150' with close S to promote greater access to home and community. 5) improve balance grades by 1/2 to promote safety with functional mobility. 6) improve strength grades by 1/2 to promote safety with functional mobility.   PT Treatment Day 0   Plan   Treatment/Interventions LE strengthening/ROM;Therapeutic exercise;Functional  transfer training;Endurance training;Cognitive reorientation;Patient/family training;Equipment eval/education;Bed mobility;Gait training;Spoke to nursing;Spoke to case management;OT   PT Frequency 1-2x/wk   Discharge Recommendation   Rehab Resource Intensity Level, PT II (Moderate Resource Intensity)   Equipment Recommended   (TBD)   AM-PAC Basic Mobility Inpatient   Turning in Flat Bed Without Bedrails 1   Lying on Back to Sitting on Edge of Flat Bed Without Bedrails 1   Moving Bed to Chair 1   Standing Up From Chair Using Arms 1   Walk in Room 1   Climb 3-5 Stairs With Railing 1   Basic Mobility Inpatient Raw Score 6   Turning Head Towards Sound 3   Follow Simple Instructions 2   Low Function Basic Mobility Raw Score  11   Low Function Basic Mobility Standardized Score  16.55   Highest Level Of Mobility   JH-HLM Goal 2: Bed activities/Dependent transfer   JH-HLM Achieved 3: Sit at edge of bed   Modified [place] Scale   Modified [place] Scale 5   Barthel Index   Feeding 0   Bathing 0   Grooming Score 0   Dressing Score 0   Bladder Score 0   Bowels Score 0   Toilet Use Score 0   Transfers (Bed/Chair) Score 5   Mobility (Level Surface) Score 0   Stairs Score 0   Barthel Index Score 5   Peterson Dale, PT, DPT

## 2023-12-15 NOTE — ASSESSMENT & PLAN NOTE
Presented with right facial droop and slurred speech  CT head without any acute abnormality  CTA head and neck showed no large vessel occlusion or acute thrombus but it was a difficult study  Neurology consulted for suspected metabolic encephalopathy in the setting of sepsis  Stroke pathway discontinued  MRI deferred

## 2023-12-15 NOTE — ASSESSMENT & PLAN NOTE
Physical and Occupational Therapy consults with case management.  Will defer thorough pain control for now in lieu of decreased mental status

## 2023-12-15 NOTE — ASSESSMENT & PLAN NOTE
Patient was required 2L NC overnight and has escalated to 6 L nasal cannula this afternoon  Likely secondary to bilateral pleural effusion noted on CT scan  Received IV fluids on admission for sepsis  Hold further IVF  IV Lasix 20 mg x 1

## 2023-12-15 NOTE — ASSESSMENT & PLAN NOTE
Wound care consult.  Infectious disease consult.  Possible extension of the decubitus ulcer?  Patient already had CTA of the head hence exposed to contrast.  Will add CT of the abdomen pelvis.  Currently on cefepime with vancomycin.  Pharmacy consult.  Lactic acid normal at 1.6.  Procalcitonin pending although sedimentation rate greater than 130.  May need to consider possibility of osteo?

## 2023-12-15 NOTE — PROGRESS NOTES
[facility]  Progress Note  Name: Lucero Booth I  MRN: [id number]  Unit/Bed#: [facility] 713-01 I Date of Admission: 12/14/2023   Date of Service: 12/15/2023 I Hospital Day: 1    Assessment/Plan   * Sepsis (HCC)  Assessment & Plan  Evidenced by tachypnea and leukocytosis  Blood cultures with GPC, likely streptococcal bacteremia  No other source appreciated  UA/flu/RSV/COVID/unremarkable  CT CAP no other acute findings of infection aside from likely chronic osteomyelitis  Continue vancomycin  Supportive care    Acute metabolic encephalopathy  Assessment & Plan  Initially presenting with strokelike symptoms with right-sided facial droop and generalized weakness with slurred speech.    CT head and CTA head and neck unremarkable for any acute stroke  MRI deferred by neurology  Neurology suspects metabolic encephalopathy in setting of sepsis  Holding home Haldol and Xanax at this time given encephalopathy  Monitor off IVF as patient has new oxygen requirements after fluid resuscitation on presentation  See additional management per sepsis A/P      Acute respiratory failure (HCC)  Assessment & Plan  Patient was required 2L NC overnight and has escalated to 6 L nasal cannula this afternoon  Likely secondary to bilateral pleural effusion noted on CT scan  Received IV fluids on admission for sepsis  Hold further IVF  IV Lasix 20 mg x 1    Rash  Assessment & Plan  Right upper extremity with erythema in the antecubital region  Cellulitis versus possible allergy from cefepime administration yesterday given patient's history of cephalosporin  Monitor closely  Continue vancomycin    Bacteremia  Assessment & Plan  Streptococcal bacteremia, unclear etiology.  Possible right upper extremity cellulitis as source.  Continue vancomycin  ID following  Repeat blood cultures in a.m.  Check TTE to assess for endocarditis    Elevated troponin  Assessment & Plan  Patient with elevated troponin on first  determination however became within normal limits on second determination.  Most likely could be a consequence of infection.    Acquired hypothyroidism  Assessment & Plan  Continue levothyroxine 25 mcg.    Primary insomnia  Assessment & Plan  Patient on melatonin and is on both Ativan and Xanax.  These are on hold for now given encephalopathy.    Major depressive disorder, recurrent, in remission, unspecified (HCC)  Assessment & Plan  Patient on haloperidol which is currently on hold.    Primary hypertension  Assessment & Plan  Blood pressure stable  Continue amlodipine 5 mg daily  Continue lisinopril 10 mg daily  Continue Toprol 100    Ambulatory dysfunction  Assessment & Plan  Patient is unable to walk however is able to at least to pivot and stand from bed.  Physical and Occupational Therapy consult, case management.    Degenerative disc disease, thoracic  Assessment & Plan  Physical and Occupational Therapy consults with case management.  Start oxycodone 2.5 mg as needed given significant pain    Polypharmacy  Assessment & Plan  Patient is on multiple medications including pain medications, sedatives, antidepressants.  Will put most of these on hold.  Patient's daughter is in the room and has been advised regarding discontinuation of these medications so that we can fully assess improvement of her mental status.    Stage IV decubitus ulcer (HCC)  Assessment & Plan  Wound care and general surgery consult  CT showing signs of underlying osteomyelitis though chronicity is uncertain  Continue wound care management    Stroke-like symptoms  Assessment & Plan  Presented with right facial droop and slurred speech  CT head without any acute abnormality  CTA head and neck showed no large vessel occlusion or acute thrombus but it was a difficult study  Neurology consulted for suspected metabolic encephalopathy in the setting of sepsis  Stroke pathway discontinued  MRI deferred           VTE Pharmacologic Prophylaxis:    Moderate Risk (Score 3-4) - Pharmacological DVT Prophylaxis Ordered: apixaban (Eliquis).    Mobility:   Basic Mobility Inpatient Raw Score: 6  JH-HLM Goal: 2: Bed activities/Dependent transfer  JH-HLM Achieved: 2: Bed activities/Dependent transfer  HLM Goal achieved. Continue to encourage appropriate mobility.    Patient Centered Rounds: I performed bedside rounds with nursing staff today.   Discussions with Specialists or Other Care Team Provider: ID, neurology    Education and Discussions with Family / Patient: Updated  (daughter) at bedside.    Total Time Spent on Date of Encounter in care of patient: 25 mins. This time was spent on one or more of the following: performing physical exam; counseling and coordination of care; obtaining or reviewing history; documenting in the medical record; reviewing/ordering tests, medications or procedures; communicating with other healthcare professionals and discussing with patient's family/caregivers.    Current Length of Stay: 1 day(s)  Current Patient Status: Inpatient   Certification Statement: The patient will continue to require additional inpatient hospital stay due to encephalopathy  Discharge Plan: Anticipate discharge in 48-72 hrs to discharge location to be determined pending rehab evaluations.    Code Status: Level 2 - DNAR: but accepts endotracheal intubation    Subjective:   Lethargic.  Follows some commands.  Complains of back pain and leg pain.  Denies any shortness of breath.    Objective:     Vitals:   Temp (24hrs), Av °F (36.1 °C), Min:96.6 °F (35.9 °C), Max:97.3 °F (36.3 °C)    Temp:  [96.6 °F (35.9 °C)-97.3 °F (36.3 °C)] 96.6 °F (35.9 °C)  HR:  [67-84] 71  Resp:  [16-40] 16  BP: (125-156)/(48-73) 148/67  SpO2:  [91 %-96 %] 96 %  Body mass index is 39.7 kg/m².     Input and Output Summary (last 24 hours):     Intake/Output Summary (Last 24 hours) at 12/15/2023 9495  Last data filed at 12/15/2023 0235  Gross per 24 hour   Intake 1285 ml    Output --   Net 1285 ml       Physical Exam:   Physical Exam  Vitals reviewed.   Constitutional:       General: She is not in acute distress.     Appearance: She is not ill-appearing.   HENT:      Head: Normocephalic and atraumatic.   Cardiovascular:      Rate and Rhythm: Normal rate and regular rhythm.   Pulmonary:      Effort: Pulmonary effort is normal. No respiratory distress.      Comments: Decreased breath sounds bilateral bases  6 L nasal cannula  Abdominal:      General: Bowel sounds are normal.      Tenderness: There is no abdominal tenderness.   Musculoskeletal:      Right lower leg: No edema.      Left lower leg: No edema.   Skin:     Findings: Erythema (Right antecubital region) present.   Neurological:      Mental Status: She is lethargic and disoriented.          Additional Data:     Labs:  Results from last 7 days   Lab Units 12/15/23  0447   WBC Thousand/uL 30.89*   HEMOGLOBIN g/dL 10.2*   HEMATOCRIT % 32.6*   PLATELETS Thousands/uL 154   BANDS PCT % 27*   LYMPHO PCT % 2*   MONO PCT % 3*   EOS PCT % 0     Results from last 7 days   Lab Units 12/15/23  0447   SODIUM mmol/L 131*   POTASSIUM mmol/L 3.4*   CHLORIDE mmol/L 100   CO2 mmol/L 21   BUN mg/dL 34*   CREATININE mg/dL 0.96   ANION GAP mmol/L 10   CALCIUM mg/dL 7.7*   ALBUMIN g/dL 2.2*   TOTAL BILIRUBIN mg/dL 1.28*   ALK PHOS U/L 127*   ALT U/L 12   AST U/L 28   GLUCOSE RANDOM mg/dL 117     Results from last 7 days   Lab Units 12/15/23  0447   INR  2.09*     Results from last 7 days   Lab Units 12/14/23  1708   POC GLUCOSE mg/dl 182*         Results from last 7 days   Lab Units 12/14/23  2016   LACTIC ACID mmol/L 1.6       Lines/Drains:  Invasive Devices       Peripheral Intravenous Line  Duration             Peripheral IV 12/14/23 Dorsal (posterior);Left Forearm <1 day                          Imaging: Reviewed radiology reports from this admission including: chest CT scan and abdominal/pelvic CT    Recent Cultures (last 7 days):   Results from  last 7 days   Lab Units 12/14/23 2016   GRAM STAIN RESULT  Gram positive cocci in pairs and chains*  Gram positive cocci in pairs and chains*       Last 24 Hours Medication List:   Current Facility-Administered Medications   Medication Dose Route Frequency Provider Last Rate    acetaminophen  975 mg Oral Q8H [facility] Edenilson Kramer DO      aluminum-magnesium hydroxide-simethicone  30 mL Oral Q6H PRN Aurelio Kelly MD      amiodarone  200 mg Oral Daily Aurelio Kelly MD      amLODIPine  5 mg Oral Daily Aureliomayra Kelly MD      And    atorvastatin  10 mg Oral Daily Aurelioclem Kelly MD      ammonium lactate  1 Application Topical Daily Aurelio Kelly MD      apixaban  5 mg Oral BID Edenilson Kramer DO      Diclofenac Sodium  2 g Topical 4x Daily Aurelio Kelly MD      docusate sodium  100 mg Oral BID PRN Aurelio Kelly MD      ferrous sulfate  325 mg Oral Q48H Aurelio Kelly MD      Fluticasone Furoate-Vilanterol  1 puff Inhalation Daily Aurelio Kelly MD      hyoscyamine  0.125 mg Oral Q6H PRN Aurelio Kelly MD      latanoprost  1 drop Both Eyes HS Aurelio Kelly MD      levothyroxine  25 mcg Oral Early Morning Aurelio Kelly MD      lidocaine  1 patch Topical Daily Aurelio Kelly MD      lisinopril  10 mg Oral Daily Aurelio Kelly MD      metoprolol succinate  100 mg Oral Daily Aureliomayra Kelly MD      ondansetron  4 mg Intravenous Q6H PRN Aurelio Kelly MD      oxyCODONE  2.5 mg Oral Q4H PRN Edenilson Kramer,       potassium chloride  20 mEq Intravenous Once Edenilson Kramer DO 20 mEq (12/15/23 1552)    pseudoephedrine  60 mg Oral Q6H PRN Aurelio Kelly MD      vancomycin  750 mg Intravenous Q12H Aurelio Kelly  mg (12/15/23 1042)        Today, Patient Was Seen By: Edenilson Kramer DO    **Please Note: This note may have been constructed using a voice recognition system.**

## 2023-12-15 NOTE — ASSESSMENT & PLAN NOTE
Evidenced by tachypnea and leukocytosis  Blood cultures with GPC, likely streptococcal bacteremia  No other source appreciated  UA/flu/RSV/COVID/unremarkable  CT CAP no other acute findings of infection aside from likely chronic osteomyelitis  Continue vancomycin  Supportive care

## 2023-12-15 NOTE — PROGRESS NOTES
NEUROLOGY RESIDENCY PROGRESS NOTE     Name: Lucero Booth   Age & Sex: 88 y.o. female   MRN: [id number]  Unit/Bed#: [facility] 713-01   Encounter: [id number]    Recommendations for outpatient neurological follow up have yet to be determined.     Pending for discharge: Clinical Improvement    ASSESSMENT & PLAN     Stroke-like symptoms  Assessment & Plan  Patient has a PMH of HTN, HLD, afib, asthma, obesity, and anxiety. Patient is on Eliquis 2.5 mg BID for afib.  Patient was discovered to have a R facial droop and slurred speech by her family. Her BP with EMS was  and Glucose in the 200s. Patient comes from [facility] with an unknown last known well. NIHSS of 7. CTH showed no acute intracranial abnormalities. CTA showed no large vessel occlusion or acute thrombus but it was a difficult study.    Initial NIHSS 7  Presenting deficits were: R facial droop and slurred speech - unable to be appreciated on NIHSS  Interventions: Patient not a candidate. Unclear time of onset outside appropriate time window.     Vascular risk factors: elevated cholesterol and weight    Workup:  Lab Results   Component Value Date    INR 2.09 (H) 12/15/2023      CTH: No evidence of acute vascular territorial infarction, intracranial hemorrhage or mass.   CTA: Technically suboptimal exam. No evidence of large vessel occlusion or acute thrombus in the proximal portions of the major vessels of the [place]. No evidence of hemodynamically significant stenosis or occlusion of the carotid or vertebral arteries. Small bilateral pleural effusions and bibasilar atelectasis.  MRI: Deferred  TTE/MARIANA 10/9/23:  Left ventricular cavity size is normal. Wall thickness is mildly increased. There is mild concentric hypertrophy. The left ventricular ejection fraction is 55-60%. Systolic function is normal. Wall motion is normal.Right Ventricle: A pacer wire is present. Left Atrium: The atrium is mildly dilated.    Metabolic Work-up:  WBC  "33.44  Blood culture: Gram positive cocci in pairs and chains x2  Urine Microscopic: Bacteria innumerable   CRP: 278.6  Sed rate: >130  COVID/Flu/RSV: Negative    Pertinent scores as of 12/15/23:  - NIHSS: 7    Impression: Patient is an 88-year-old female who presented to the ED as a result of my symptoms.  Patient's symptoms secondary to metabolic encephalopathy in the setting of sepsis. Will d/c stroke pathway at this time.    Plan:  Continue AP/AC: Eliquis 2.5 mg BID  Would recommend increasing Eliquis to 5 mg BID for primary stroke prevention secondary to Afib.  Continue cholesterol med: Lipitor 10 mg   Healthy diet encouraged  MRI Brain wo contrast deferred given severe metabolic derangements  ECHO deferred given last ECHO within the last 3 months  Optimize all metabolic derangements  PT/OT versus active exercise discussed  BP management and HTN med compliance discussed, continue normotension given unknown last known well  Rest of care as per primary care team  No further inpatient recommendations at this time, available via TT        SUBJECTIVE     Patient is an 88 year old female who presented to the ED for stroke-like symptoms. Today, patient states that she is not in pain but patient not participating in conversation. Patient was seen and examined. No acute events overnight.     Review of Systems   Unable to perform ROS: Mental status change       OBJECTIVE     Patient ID: Lucreo Booth is a 88 y.o. female.    Vitals:    12/15/23 0309 12/15/23 0500 12/15/23 0600 12/15/23 0751   BP: (!) 141/48 136/50  156/72   BP Location: Left arm      Pulse: 71 69  70   Resp: 20   18   Temp: (!) 96.7 °F (35.9 °C) (!) 97.3 °F (36.3 °C)  (!) 96.6 °F (35.9 °C)   TempSrc: Axillary      SpO2: 92% 92%  94%   Weight: 102 kg (224 lb 3.3 oz)  102 kg (224 lb 1.6 oz)    Height: 5' 3\" (1.6 m)         Temperature:   Temp (24hrs), Av °F (36.1 °C), Min:96.6 °F (35.9 °C), Max:97.3 °F (36.3 °C)    Temperature: (!) 96.6 °F (35.9 " °C)      Physical Exam  Vitals reviewed.   Constitutional:       Appearance: She is ill-appearing.   HENT:      Head: Normocephalic and atraumatic.      Mouth/Throat:      Mouth: Mucous membranes are moist.   Eyes:      Extraocular Movements: Extraocular movements intact and EOM normal.      Conjunctiva/sclera: Conjunctivae normal.      Pupils: Pupils are equal, round, and reactive to light.   Cardiovascular:      Rate and Rhythm: Normal rate.   Pulmonary:      Comments: Patient on 2L O2 NC   Skin:     General: Skin is warm.   Neurological:      Mental Status: She is alert.      Coordination: Finger-nose-finger test: Unable to test given patient not understanding command.      Deep Tendon Reflexes:      Reflex Scores:       Tricep reflexes are 1+ on the right side and 1+ on the left side.       Bicep reflexes are 1+ on the right side and 1+ on the left side.       Brachioradialis reflexes are 1+ on the right side and 1+ on the left side.       Patellar reflexes are 1+ on the right side and 1+ on the left side.       Achilles reflexes are 1+ on the right side and 1+ on the left side.  Psychiatric:         Speech: Speech normal.      Comments: Some confusion versus altered mental status possibly          Neurologic Exam     Mental Status   Oriented to person.   Oriented to year and month.   Speech: speech is normal   Level of consciousness: arousable by tactile stimuli ,  arousable by verbal stimuli  Patient able to follow most commands.     Cranial Nerves     CN II   Visual fields full to confrontation.     CN III, IV, VI   Pupils are equal, round, and reactive to light.  Extraocular motions are normal.     CN V   Facial sensation intact.     CN VII   Facial expression full, symmetric.     CN VIII   CN VIII normal.     CN IX, X   CN IX normal.   CN X normal.     CN XI   CN XI normal.     CN XII   CN XII normal.     Motor Exam   RUE: 5/5  RLE: 3/5  LUE: 5/5  LLE: 2/5     Sensory Exam   Light touch normal.     Gait,  Coordination, and Reflexes     Coordination   Finger-nose-finger test: Unable to test given patient not understanding command.    Reflexes   Right brachioradialis: 1+  Left brachioradialis: 1+  Right biceps: 1+  Left biceps: 1+  Right triceps: 1+  Left triceps: 1+  Right patellar: 1+  Left patellar: 1+  Right achilles: 1+  Left achilles: 1+      LABORATORY DATA     Labs: I have personally reviewed pertinent reports.    Results from last 7 days   Lab Units 12/15/23  0447 12/14/23  1917   WBC Thousand/uL 30.89* 33.44*   HEMOGLOBIN g/dL 10.2* 10.6*   HEMATOCRIT % 32.6* 31.6*   PLATELETS Thousands/uL 154 169   MONO PCT % 3*  --    EOS PCT % 0  --       Results from last 7 days   Lab Units 12/15/23  0447 12/14/23  1808   SODIUM mmol/L 131* 128*   POTASSIUM mmol/L 3.4* 4.5   CHLORIDE mmol/L 100 102   CO2 mmol/L 21 20*   BUN mg/dL 34* 35*   CREATININE mg/dL 0.96 0.90   CALCIUM mg/dL 7.7* 7.1*   ALK PHOS U/L 127*  --    ALT U/L 12  --    AST U/L 28  --      Results from last 7 days   Lab Units 12/15/23  0447   MAGNESIUM mg/dL 1.9     Results from last 7 days   Lab Units 12/15/23  0447   PHOSPHORUS mg/dL 2.8      Results from last 7 days   Lab Units 12/15/23  0447 12/14/23  1808   INR  2.09* 2.11*   PTT seconds 36 31     Results from last 7 days   Lab Units 12/14/23 2016   LACTIC ACID mmol/L 1.6           IMAGING & DIAGNOSTIC TESTING     Radiology Results: I have personally reviewed pertinent reports.   and I have personally reviewed pertinent films in PACS    CT chest abdomen pelvis wo contrast   Final Result by Best Merida MD (12/15 0958)      1.  Moderate bilateral pleural effusions with overlying compressive atelectasis.      2.  Irregularity and sclerosis of the distal sacrum and coccyx underlying the large decubitus ulcer suspicious for osteomyelitis though chronicity is uncertain as there are no prior studies available for comparison. This would be better evaluated with    MRI.      3.  Hepatic cirrhosis.       4.  Right ventral hernia containing a nonobstructed loop of transverse colon.      The study was marked in EPIC for immediate notification.         Workstation performed: [id number]         XR chest portable   Final Result by Varghese De La Paz MD (12/15 0904)      Mild cardiomegaly.      Vascular congestion.      Layering pleural effusions and bibasilar atelectasis.      Per MRI query; no pacer.                  Workstation performed: [id number]         CTA stroke alert (head/neck)   Final Result by Fran Veras MD (12/14 1757)         1. Technically suboptimal exam. No evidence of large vessel occlusion or acute thrombus in the proximal portions of the major vessels of the [place] of [place].   2. No evidence of hemodynamically significant stenosis or occlusion of the carotid or vertebral arteries.   3. Small bilateral pleural effusions and bibasilar atelectasis.               Findings were directly discussed with Troy Vanessa at 5:55 PM .                           Workstation performed: [id number]         CT stroke alert brain   Final Result by Fran Veras MD (12/14 1757)      No evidence of acute vascular territorial infarction, intracranial hemorrhage or mass.      Findings were directly discussed with Troy Vanessa at  5:25 PM  .      Workstation performed: [id number]             Other Diagnostic Testing: I have personally reviewed pertinent reports.      ACTIVE MEDICATIONS     Current Facility-Administered Medications   Medication Dose Route Frequency    acetaminophen (TYLENOL) tablet 650 mg  650 mg Oral Q6H PRN    aluminum-magnesium hydroxide-simethicone (MAALOX) oral suspension 30 mL  30 mL Oral Q6H PRN    amiodarone tablet 200 mg  200 mg Oral Daily    amLODIPine (NORVASC) tablet 5 mg  5 mg Oral Daily    And    atorvastatin (LIPITOR) tablet 10 mg  10 mg Oral Daily    ammonium lactate (LAC-HYDRIN) 12 % lotion 1 Application  1 Application Topical Daily    apixaban (ELIQUIS) tablet 2.5 mg   2.5 mg Oral BID    Diclofenac Sodium (VOLTAREN) 1 % topical gel 2 g  2 g Topical 4x Daily    docusate sodium (COLACE) capsule 100 mg  100 mg Oral BID PRN    ferrous sulfate tablet 325 mg  325 mg Oral Q48H    Fluticasone Furoate-Vilanterol 100-25 mcg/actuation 1 puff  1 puff Inhalation Daily    hyoscyamine (LEVSIN/SL) SL tablet 0.125 mg  0.125 mg Oral Q6H PRN    latanoprost (XALATAN) 0.005 % ophthalmic solution 1 drop  1 drop Both Eyes HS    levothyroxine tablet 25 mcg  25 mcg Oral Early Morning    lidocaine (LIDODERM) 5 % patch 1 patch  1 patch Topical Daily    lisinopril (ZESTRIL) tablet 10 mg  10 mg Oral Daily    metoprolol succinate (TOPROL-XL) 24 hr tablet 100 mg  100 mg Oral Daily    ondansetron (ZOFRAN) injection 4 mg  4 mg Intravenous Q6H PRN    potassium chloride 20 mEq IVPB (premix)  20 mEq Intravenous Once    pseudoephedrine (SUDAFED) tablet 60 mg  60 mg Oral Q6H PRN    vancomycin (VANCOCIN) IVPB (premix in dextrose) 750 mg 150 mL  750 mg Intravenous Q12H       Prior to Admission medications    Medication Sig Start Date End Date Taking? Authorizing Provider   acetaminophen (TYLENOL) 325 mg tablet Take 650 mg by mouth every 6 (six) hours as needed for mild pain, headaches or fever   Yes Historical Provider, MD   ALPRAZolam (NIRAVAM) 0.5 MG dissolvable tablet Take 0.5 mg by mouth every 6 (six) hours as needed for anxiety   Yes Historical Provider, MD   ALPRAZolam (XANAX) 0.5 mg tablet Take 0.5 mg by mouth daily at bedtime   Yes Historical Provider, MD   amiodarone 200 mg tablet Take 200 mg by mouth daily   Yes Historical Provider, MD   amLODIPine-atorvastatin (CADUET) 5-10 MG per tablet Take 1 tablet by mouth daily   Yes Historical Provider, MD   ammonium lactate (LAC-HYDRIN) 12 % lotion Apply 1 Application topically daily   Yes Historical Provider, MD   apixaban (Eliquis) 2.5 mg Take 2.5 mg by mouth 2 (two) times a day   Yes Historical Provider, MD   camphor-menthol (SARNA) lotion Apply 1 Application  topically as needed for itching   Yes Historical Provider, MD   Diclofenac Sodium (VOLTAREN) 1 % Apply 2 g topically 4 (four) times a day   Yes Historical Provider, MD   DULoxetine (CYMBALTA) 30 mg delayed release capsule Take 30 mg by mouth daily   Yes Historical Provider, MD   ferrous sulfate 325 (65 Fe) mg tablet Take 325 mg by mouth every other day   Yes Historical Provider, MD   Fluticasone Furoate-Vilanterol (Breo Ellipta) 100-25 mcg/actuation inhaler Inhale 1 puff daily Rinse mouth after use.   Yes Historical Provider, MD   haloperidol (HALDOL) oral concentrated solution Take 1 mg by mouth every 6 (six) hours as needed for agitation   Yes Historical Provider, MD   hyoscyamine (LEVSIN/SL) 0.125 mg SL tablet Take 0.125 mg by mouth every 6 (six) hours as needed for cramping   Yes Historical Provider, MD   latanoprost (XALATAN) 0.005 % ophthalmic solution 1 drop daily at bedtime   Yes Historical Provider, MD   levothyroxine 25 mcg tablet Take 25 mcg by mouth daily   Yes Historical Provider, MD   Lidocaine (HM Lidocaine Patch) 4 % PTCH Apply 1 Application topically in the morning   Yes Historical Provider, MD   lisinopril (ZESTRIL) 10 mg tablet Take 10 mg by mouth daily   Yes Historical Provider, MD   LORazepam (ATIVAN) 1 mg tablet Take 1 mg by mouth every 6 (six) hours as needed for anxiety   Yes Historical Provider, MD   melatonin 3 mg Take 3 mg by mouth daily at bedtime   Yes Historical Provider, MD   methocarbamol (ROBAXIN) 750 mg tablet Take 750 mg by mouth every 4 (four) hours as needed for muscle spasms   Yes Historical Provider, MD   metoprolol succinate (TOPROL-XL) 100 mg 24 hr tablet Take 100 mg by mouth daily   Yes Historical Provider, MD   oxyCODONE-acetaminophen (PERCOCET) 5-325 mg per tablet Take 1 tablet by mouth every 8 (eight) hours as needed for moderate pain   Yes Historical Provider, MD   pseudoephedrine (SUDAFED) 30 mg tablet Take 60 mg by mouth every 6 (six) hours as needed for congestion    Yes Historical Provider, MD         VTE Pharmacologic Prophylaxis: Apixaban (Eliquis)  VTE Mechanical Prophylaxis: sequential compression device and foot pump applied    ======    I have discussed the patient's history, physical exam findings, assessment, and plan in detail with attending, Dr. Vanessa.    Thank you for allowing me to participate in the care of your patient, Lucero Booth.    Homero Kramer,   [facility] Neurology Residency, PGY-2

## 2023-12-15 NOTE — ASSESSMENT & PLAN NOTE
Patient on melatonin and is on both Ativan and Xanax.  These are on hold for now given encephalopathy.

## 2023-12-15 NOTE — ASSESSMENT & PLAN NOTE
Wound care and general surgery consult  CT showing signs of underlying osteomyelitis though chronicity is uncertain  Continue wound care management

## 2023-12-15 NOTE — CASE MANAGEMENT
Case Management Assessment & Discharge Planning Note    Patient name Lucero Booth  Location [facility]-01 MRN [id number]  : 1935 Date 12/15/2023       Current Admission Date: 2023  Current Admission Diagnosis:Stroke-like symptoms   Patient Active Problem List    Diagnosis Date Noted    Stroke-like symptoms 2023    Acute metabolic encephalopathy 2023    Stage IV decubitus ulcer (HCC) 2023    Polypharmacy 2023    Acute kidney insufficiency 2023    Degenerative disc disease, thoracic 2023    Ambulatory dysfunction 2023    Primary hypertension 2023    Major depressive disorder, recurrent, in remission, unspecified (HCC) 2023    Primary insomnia 2023    Acquired hypothyroidism 2023    Elevated troponin 2023      LOS (days): 1  Geometric Mean LOS (GMLOS) (days): 4.5  Days to GMLOS:3.8     OBJECTIVE:    Risk of Unplanned Readmission Score: 24.33         Current admission status: Inpatient  Referral Reason: Placement within 24-48 hours    Preferred Pharmacy:   [facility] - [place], [place] - [address]  [address]  [place] [address]  Phone: [phone] Fax: [phone]    Primary Care Provider: Joe Briggs    Primary Insurance: [facility]  Secondary Insurance:     ASSESSMENT:  Active Health Care Proxies    There are no active Health Care Proxies on file.       Advance Directives  Does patient have a Health Care POA?: Yes  Primary Contact: mahnaz Booth (Daughter)  [phone] (Mobile)              Patient Information  Admitted from:: Facility  Mental Status: Confused  During Assessment patient was accompanied by: Not accompanied during assessment  Assessment information provided by:: Daughter  Primary Caregiver: Self  Support Systems: Home care staff  What city do you live in?: [place]  Home entry access options. Select all that apply.: No steps to enter home  Type  of Current Residence: Facility  Upon entering residence, is there a bedroom on the main floor (no further steps)?: Yes  Upon entering residence, is there a bathroom on the main floor (no further steps)?: Yes  Living Arrangements: Other (Comment) (Facility resident)  Is patient a ?: No    Activities of Daily Living Prior to Admission  Functional Status: Assistance  Completes ADLs independently?: No  Level of ADL dependence: Total Dependent  Ambulates independently?: No  Level of ambulatory dependence: Total Dependent  Does patient use assisted devices?: Yes  Assisted Devices (DME) used: Wheelchair, Walker  Does patient currently own DME?: Yes  What DME does the patient currently own?: Wheelchair, Walker  Does patient have a history of Outpatient Therapy (PT/OT)?: No  Does the patient have a history of Short-Term Rehab?: Yes  Does patient have a history of HHC?: Yes  Does patient currently have HHC?: Yes    Current Home Health Care  Type of Current Home Care Services: Home PT, Home OT, Nurse visit  Current Home Health Agency:: Other (please enter name in comment) ([facility])  Current Home Health Follow-Up Provider:: PCP    Patient Information Continued  Income Source: SSI/SSD  Does patient have prescription coverage?: Yes  Does patient receive dialysis treatments?: No  Does patient have a history of substance abuse?: No  Does patient have a history of Mental Health Diagnosis?: No         Means of Transportation  Means of Transport to Appts:: Other (Comment) (Facility transport)      Housing Stability: Low Risk  (12/15/2023)    Housing Stability Vital Sign     Unable to Pay for Housing in the Last Year: No     Number of Places Lived in the Last Year: 2     Unstable Housing in the Last Year: No   Food Insecurity: No Food Insecurity (12/15/2023)    Hunger Vital Sign     Worried About Running Out of Food in the Last Year: Never true     Ran Out of Food in the Last Year: Never true   Transportation Needs: No  Transportation Needs (12/15/2023)    PRAPARE - Transportation     Lack of Transportation (Medical): No     Lack of Transportation (Non-Medical): No   Utilities: Not At Risk (12/15/2023)    [facility] Utilities     Threatened with loss of utilities: No       DISCHARGE DETAILS:    Discharge planning discussed with:: Patients daughter and Libra with [facility] contacted family/caregiver?: Yes  Were Treatment Team discharge recommendations reviewed with patient/caregiver?: Yes  Did patient/caregiver verbalize understanding of patient care needs?: Yes  Were patient/caregiver advised of the risks associated with not following Treatment Team discharge recommendations?: Yes    Contacts  Patient Contacts: mahnaz Booth (Daughter)  [phone] (Mobile)  Relationship to Patient:: Family  Contact Method: Phone  Phone Number: mahnaz Booth (Daughter)  [phone] (Mobile)  Reason/Outcome: Discharge Planning    Requested Home Health Care         Is the patient interested in HHC at discharge?: Yes  Home Health Discipline requested:: Nursing, Occupational Therapy, Physical Therapy  [facility] Health Agency Name[facility]  HHA External Referral Reason (only applicable if external HHA name selected): Patient has established relationship with provider  Home Health Follow-Up Provider:: PCP  Home Health Services Needed:: Wound/Ostomy Care  Homebound Criteria Met:: Requires Medical Transportation, Requires the Assistance of Another Person for Safe Ambulation or to Leave the Home, Uses an Assist Device (i.e. cane, walker, etc)  Supporting Clincal Findings:: Bed Bound or Wheelchair Bound         Other Referral/Resources/Interventions Provided:  Interventions: Assisted Living         Treatment Team Recommendation: Assisted Living  Discharge Destination Plan:: Assisted Living     CM reviewed d/c planning process including the following: identifying help at home, patient preference for d/c planning needs,[facility] Meds to Bed program,  availability of treatment team to discuss questions or concerns patient and/or family may have regarding understanding medications and recognizing signs and symptoms once discharged. CM also encouraged patient's family to follow up with all recommended appointments after discharge. Patient advised of importance for patient and family to participate in managing patient’s medical well being.    Patient Lives in [facility] [place] by [facility], she is in a Care Plus Unit, with extra assistance with all ADLs. She is active under [facility], Referral submitted. CM will follow for medical stability.

## 2023-12-15 NOTE — UTILIZATION REVIEW
NOTIFICATION OF INPATIENT ADMISSION   AUTHORIZATION REQUEST   SERVICING FACILITY:   [facility]  Address: [address] [place] [address]  Tax ID: [id number]  NPI: [id number] ATTENDING PROVIDER:  Attending Name and NPI#: Edenilson Kramer Do [[id number]]  Address: [address] [place] [address]  Phone: [phone]   ADMISSION INFORMATION:  Place of Service: Inpatient [facility]  Place of Service Code: 21  Inpatient Admission Date/Time: 12/14/23  9:14 PM  Discharge Date/Time: No discharge date for patient encounter.  Admitting Diagnosis Code/Description:  Stroke (HCC) [I63.9]  Stroke-like symptoms [R29.90]     UTILIZATION REVIEW CONTACT:  Elvin Duval Utilization   Network Utilization Review Department  Phone: [phone]  Fax: [phone]  Email: [email or web address]  Contact for approvals/pending authorizations, clinical reviews, and discharge.     PHYSICIAN ADVISORY SERVICES:  Medical Necessity Denial & [id number] Review  Phone: [phone]  Fax: [phone]  Email: [email or web address]     DISCHARGE SUPPORT TEAM:  For Patients Discharge Needs & Updates  Phone: [phone] opt. 2 Fax: [phone]  Email: [email or web address]

## 2023-12-15 NOTE — CONSULTS
Consultation - General Surgery  Lucero Booth 88 y.o. female MRN: [id number]  [facility] Encounter: [id number]      Assessment:  88F with stage 4 sacral ulcer.    Plan:  - no acute surgical intervention  - wound care per wound care nursing recs  - appreciate ID recs  - rest of care per primary    HPI:  Lucero Booth is a 88 y.o. female with PMHx of Afib, DDD, asthma, liver steatosis, HTN, HLD, obesity, back surgery, who presents with stroke-like symptoms and stage 4 sacral ulcer. Started over 1 year ago after having back surgery and not getting out of bed much. Wound has been vac'd in the last 2 months. Has a lot of back pain.  No fever/chills, n/v. Has not had a BM since in the hospital.  96.6F, other VSS on 5L NC.    Meds: eliquis, vanc (12/15-), metoprolol, lisinopril, amiodarone, amlodipine, levothyroxine, statin  Results:  12/15 UA: innumerable bacteria  12/14 bcx: GPC, psb strep  Recent Labs     12/14/23  1808 12/14/23  1917 12/14/23  2016 12/15/23  0447   WBC  --  33.44*  --  30.89*   HGB  --  10.6*  --  10.2*   PLT  --  169  --  154   SODIUM 128*  --   --  131*   K 4.5  --   --  3.4*     --   --  100   CO2 20*  --   --  21   BUN 35*  --   --  34*   CREATININE 0.90  --   --  0.96   GLUC 150*  --   --  117   CALCIUM 7.1*  --   --  7.7*   MG  --   --   --  1.9   PHOS  --   --   --  2.8   AST  --   --   --  28   ALT  --   --   --  12   ALKPHOS  --   --   --  127*   TBILI  --   --   --  1.28*   LACTICACID  --   --  1.6  --      12/15/23 CT cap:  Irregularity and sclerosis of distal sacrum and coccyx underlying the large decubitus ulcer suspicious for osteomyelitis    Physical Exam  Constitutional:       General: She is not in acute distress.     Appearance: She is obese.   HENT:      Head: Normocephalic and atraumatic.   Eyes:      Extraocular Movements: Extraocular movements intact.      Conjunctiva/sclera: Conjunctivae normal.   Cardiovascular:      Rate and Rhythm: Normal rate.       Pulses: Normal pulses.   Pulmonary:      Effort: Pulmonary effort is normal. No respiratory distress.   Abdominal:      General: There is no distension.      Palpations: Abdomen is soft.      Tenderness: There is no abdominal tenderness.   Genitourinary:     Comments: 4x4x2 cm sacral wound with beefy red granulation tissue and minimal ss drainage. Nontender.  Musculoskeletal:      Cervical back: Normal range of motion.   Skin:     General: Skin is warm and dry.   Neurological:      General: No focal deficit present.      Mental Status: She is alert.   Psychiatric:         Mood and Affect: Mood normal.       Review of Systems   Constitutional:  Negative for chills and fever.   HENT:  Negative for ear pain and sore throat.    Eyes:  Negative for pain and visual disturbance.   Respiratory:  Negative for cough and shortness of breath.    Cardiovascular:  Negative for chest pain and palpitations.   Gastrointestinal:  Negative for abdominal pain and vomiting.   Genitourinary:  Negative for dysuria and hematuria.   Musculoskeletal:  Positive for back pain. Negative for arthralgias.   Skin:  Negative for color change and rash.   Neurological:  Positive for speech difficulty. Negative for seizures and syncope.   All other systems reviewed and are negative.      Historical Information   No past medical history on file.  No past surgical history on file.  Social History   Social History     Substance and Sexual Activity   Alcohol Use Not on file     Social History     Substance and Sexual Activity   Drug Use Not on file     Social History     Tobacco Use   Smoking Status Not on file   Smokeless Tobacco Not on file       Meds/Allergies   all current active meds have been reviewed  Allergies   Allergen Reactions    Cephalosporins Other (See Comments)     Unknown       Objective   First Vitals:   Blood Pressure: (!) 138/105 (12/14/23 1705)  Pulse: 77 (12/14/23 1705)  Temperature: (!) 97.3 °F (36.3 °C) (12/14/23 1930)  Temp  "Source: Oral (12/14/23 1930)  Respirations: (!) 23 (12/14/23 1705)  Height: 5' 3\" (160 cm) (12/14/23 2138)  Weight - Scale: 101 kg (223 lb 7 oz) (12/14/23 1720)  SpO2: 100 % (12/14/23 1705)    Current Vitals:   Blood Pressure: 156/72 (12/15/23 0751)  Pulse: 70 (12/15/23 0751)  Temperature: (!) 96.6 °F (35.9 °C) (12/15/23 0751)  Temp Source: Axillary (12/15/23 0309)  Respirations: 18 (12/15/23 0751)  Height: 5' 3\" (160 cm) (12/15/23 0309)  Weight - Scale: 102 kg (224 lb 1.6 oz) (12/15/23 0600)  SpO2: 94 % (12/15/23 0751)      Intake/Output Summary (Last 24 hours) at 12/15/2023 1448  Last data filed at 12/15/2023 0235  Gross per 24 hour   Intake 1285 ml   Output --   Net 1285 ml       Invasive Devices       Peripheral Intravenous Line  Duration             Peripheral IV 12/14/23 Dorsal (posterior);Left Forearm <1 day                    Lab Results: I have personally reviewed pertinent lab results.  Imaging: I have personally reviewed pertinent reports.  EKG, Pathology, and Other Studies: I have personally reviewed pertinent reports.    Counseling / Coordination of Care  Total floor / unit time spent today 30 minutes.  Greater than 50% of total time was spent with the patient and / or family counseling and / or coordination of care.     "

## 2023-12-15 NOTE — RESTORATIVE TECHNICIAN NOTE
Restorative Technician Note      Patient Name: Lucero Booth     Note Type: Mobility  Patient Position Upon Consult: Supine  Activity Performed: Repositioned  Education Provided: Yes  Patient Position at End of Consult: Supine; All needs within reach; Bed/Chair alarm activated  Joyce WALSH, Restorative Technician,

## 2023-12-15 NOTE — H&P
[facility]  H&P  Name: Lucero Booth 88 y.o. female I MRN: [id number]  Unit/Bed#: ED 25 I Date of Admission: 12/14/2023   Date of Service: 12/14/2023 I Hospital Day: 0      Assessment/Plan   Acute metabolic encephalopathy  Assessment & Plan  Initially presenting with strokelike symptoms with right-sided facial droop and generalized weakness with slurred speech.  Neurology has been consulted.  MRI is ordered and pending.  CTA unremarkable.  Currently referred to medicine due to complex presentation and not totally convincing of stroke diagnosis  At this point patient presents more like acute metabolic encephalopathy for multiple causes including infection (source is possibly urine and/or stage IV decubitus sacral ulcer), polypharmacy and acute kidney insufficiency/dehydration.  Will discontinue any psychiatric/mind altering medications including narcotics and sedatives.  Patient would need fluids at this point.  Discontinue Lasix.  Neurology to continue following patient while in hospital.  As mentioned, MRI pending.  Will treat infection sources currently placed on cefepime with vancomycin.  Will have physical and Occupational Therapy consult as patient also has ambulatory dysfunction with case management.  Will follow patient's neurologic functioning.  Recheck metabolic profile with CBC in the morning.      Stroke-like symptoms  Assessment & Plan  See patient's plans as noted above.  Neurochecks per protocol.  Neurology continues to follow patient.    Polypharmacy  Assessment & Plan  Patient is on multiple medications including pain medications, sedatives, antidepressants.  Will put most of these on hold.  Patient's daughter is in the room and has been advised regarding discontinuation of these medications so that we can fully assess improvement of her mental status.    Stage IV decubitus ulcer (HCC)  Assessment & Plan  Wound care consult.  Infectious disease  consult.  Possible extension of the decubitus ulcer?  Patient already had CTA of the head hence exposed to contrast.  Will add CT of the abdomen pelvis.  Currently on cefepime with vancomycin.  Pharmacy consult.  Lactic acid normal at 1.6.  Procalcitonin pending although sedimentation rate greater than 130.  May need to consider possibility of osteo?    Acute kidney insufficiency  Assessment & Plan  Continue fluids.  Recheck metabolic profile.    Degenerative disc disease, thoracic  Assessment & Plan  Physical and Occupational Therapy consults with case management.  Will defer thorough pain control for now in lieu of decreased mental status    Ambulatory dysfunction  Assessment & Plan  Patient is unable to walk however is able to at least to pivot and stand from bed.  Physical and Occupational Therapy consult, case management.    Primary insomnia  Assessment & Plan  Patient on melatonin and is on both Ativan and Xanax.  These are on hold for now    Elevated troponin  Assessment & Plan  Patient with elevated troponin on first determination however became within normal limits on second determination.  Most likely could be a consequence of infection.    Acquired hypothyroidism  Assessment & Plan  Continue levothyroxine 25 mcg.  She determined to be within normal limits.    Major depressive disorder, recurrent, in remission, unspecified (HCC)  Assessment & Plan  Patient on haloperidol which is currently on hold.    Primary hypertension  Assessment & Plan  Currently on Norvasc 5 mg daily.  Metoprolol succinate 100 mg daily.  Lisinopril 10 mg daily.             VTE Prophylaxis: Apixaban (Eliquis)  / sequential compression device   Code Status: Level 2 - DNAR: but accepts endotracheal intubation as discussed with daughter in room  POLST: There is no POLST form on file for this patient (pre-hospital)    Anticipated Length of Stay:  Patient will be admitted on an Inpatient basis with an anticipated length of stay of greater  than 2 midnights.   Justification for Hospital Stay: Please see detailed plans noted above.    Chief Complaint:     Strokelike symptoms  History of Present Illness:  Lucero Booth is a 88 y.o. female who has past medical history significant for morbid obesity, ambulatory dysfunction and has not been walking for about a year now due to degenerative disc disease with status post laminectomy with spinal fixation of the thoracic 10-12 area.  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  She also has hypothyroidism, primary insomnia, hyperlipidemia, depression, constipation, hypertension, CAD, chronic anticoagulation on Eliquis, ongoing thoracic back pain.  Normally the patient is alert and awake and can carry a conversation.  However because of the ongoing back pain after she fell about a year ago, she is unable to walk however is able to at least pivot and stand getting up from bed.    Currently, the patient has a very deep stage IV sacral ulcer which is being taken care of by wound care and recently had the vacuum removed.  The wound care is currently being done by [facility].  When looked at, it is currently clean although very deep and extended to the sacral area.  Approximately a week ago, the patient was complaining of new onset thoracic back pain.  And a CT scan that was taken last week revealed presence of the degenerative disc disease of the thoracic spine.  (See the other chart for details as this is a new made-up chart for trauma.)  With that, the patient was prescribed oxycodone with acetaminophen.  Noted is that the patient is on other medications including alprazolam, duloxetine, melatonin, haloperidol, methocarbamol and lorazepam.    For the next few days there was some improvement with her symptoms and according to the daughter who regularly visits patient, she was communicative.  However she was still complaining of back pain especially with movement of her leg.  Noted that within the past 2 days or so, the  patient's mental status was somewhat acceptable but she was beginning to be less reactive.  She could still communicate.  This afternoon, the patient was noted to be totally lethargic and slurring speech.  There was noted right-sided facial droop.  With that concern, the patient was then sent over to [facility] for evaluation where initially a stroke alert was called.  Neurology saw the patient and at this point this possibility still being worked up although not as a lone primary diagnosis.    Noted is that the patient has elevated white count.  No fever noted by physical examination there is abdominal discomfort especially on the hypogastric area.  Patient denies any dysuria.  Noted as well is that initially the patient was not as reactive as per notes of previous evaluations.  During my evaluation, the patient was more reactive to speech and can at least answer simple questions.  She can also at least indicate some wants.  However, patient is generalized weak that she does not follow much instructions and commands.      Review of Systems:  Patient does not give very good review of systems  Constitutional:  Denies fever or chills   Eyes:  Denies change in visual acuity   HENT:  Denies nasal congestion or sore throat   Respiratory:  Denies cough or shortness of breath   Cardiovascular:  Denies chest pain or edema   GI: Hypogastric abdominal discomfort, without nausea, vomiting, bloody stools or diarrhea   :  Denies dysuria   Musculoskeletal:  Denies back pain or joint pain   Integument:  Denies rash   Neurologic:  Denies headache, focal weakness or sensory changes   Endocrine:  Denies polyuria or polydipsia   Psychiatric:  Denies depression or anxiety     Past Medical and Surgical History:   No past medical history on file.  No past surgical history on file.    Meds/Allergies:  (Not in a hospital admission)      Allergies: Not on File  History:  Marital Status: Single   Occupation: Used to be a legal  "  [facility]  Patient Pre-hospital Level of Mobility: Needing assistance, can stand up and pivot but otherwise does not walk   Patient Pre-hospital Diet Restrictions: Regular diabetic  Substance Use History:   Social History     Substance and Sexual Activity   Alcohol Use Not on file     Social History     Tobacco Use   Smoking Status Not on file   Smokeless Tobacco Not on file     Social History     Substance and Sexual Activity   Drug Use Not on file       Family History:  No family history on file.    Physical Exam:     Vitals:   Blood Pressure: 139/61 (12/14/23 2200)  Pulse: 68 (12/14/23 2200)  Temperature: (!) 97.3 °F (36.3 °C) (12/14/23 1930)  Temp Source: Oral (12/14/23 1930)  Respirations: (!) 38 (12/14/23 2200)  Height: 5' 3\" (160 cm) (12/14/23 2138)  Weight - Scale: 100 kg (221 lb 5.5 oz) (12/14/23 1735)  SpO2: 96 % (12/14/23 2200)    Constitutional:  Well developed, well nourished, obese, no acute distress, non-toxic appearance and sickly appearing  Eyes:  PERRL, conjunctiva normal   HENT:  Atraumatic, external ears normal, nose normal, oropharynx dry no pharyngeal exudates. Neck- normal range of motion, no tenderness, supple   Respiratory:  No respiratory distress, normal breath sounds, no rales, no wheezing   Cardiovascular:  Normal rate, normal rhythm, no murmurs, no gallops, no rubs   GI:  Soft, nondistended, globular obese, hypogastric discomfort when pressed, normal bowel sounds, nontender, no organomegaly, no mass, no rebound, no guarding   :  No costovertebral angle tenderness   Musculoskeletal:  No edema, no tenderness, no deformities. Back- no tenderness  Integument:  Well hydrated, no rash; at the back, the patient has clean demarcated stage IV decubitus ulcer that extends to the lower back from the sacral area.  It is not foul-smelling and no purulent discharge noted.  Lymphatic:  No lymphadenopathy noted   Neurologic:  Alert &awake, " communicative, no preferential movement.  Currently with slurred speech.  Psychiatric:  Speech and behavior generalized weak, lethargic      Lab Results: I have personally reviewed pertinent reports.      Results from last 7 days   Lab Units 12/14/23  1917   WBC Thousand/uL 33.44*   HEMOGLOBIN g/dL 10.6*   HEMATOCRIT % 31.6*   PLATELETS Thousands/uL 169     Results from last 7 days   Lab Units 12/14/23  1808   POTASSIUM mmol/L 4.5   CHLORIDE mmol/L 102   CO2 mmol/L 20*   BUN mg/dL 35*   CREATININE mg/dL 0.90   CALCIUM mg/dL 7.1*     Results from last 7 days   Lab Units 12/14/23  1808   INR  2.11*           Imaging: I have personally reviewed pertinent reports.      CT stroke alert brain    Result Date: 12/14/2023  Narrative: CT BRAIN - STROKE ALERT PROTOCOL INDICATION:   Stroke Alert. COMPARISON:  None. TECHNIQUE:  CT examination of the brain was performed.  In addition to axial images, coronal reformatted images were created and submitted for interpretation. Radiation dose length product (DLP) for this visit:  921.68 mGy-cm .  This examination, like all CT scans performed in the [facility], was performed utilizing techniques to minimize radiation dose exposure, including the use of iterative  reconstruction and automated exposure control. IMAGE QUALITY:  Diagnostic. FINDINGS: PARENCHYMA: Periventricular and subcortical hypoattenuating foci consistent with microangiopathic disease. No acute intracranial hemorrhage or mass effect . VENTRICLES AND EXTRA-AXIAL SPACES: Enlargement of the ventricles and sulci consistent with chronic volume loss. No hydrocephalus or extra-axial collection. VISUALIZED ORBITS: Intact globes and orbits. PARANASAL SINUSES: Clear. CALVARIUM AND EXTRACRANIAL SOFT TISSUES:   No lytic or blastic lesion or fracture.     Impression: No evidence of acute vascular territorial infarction, intracranial hemorrhage or mass. Findings were directly discussed with Troy Vanessa at  5:25  PM  . Workstation performed: [id number]     CTA stroke alert (head/neck)    Result Date: 12/14/2023  Narrative: CTA NECK AND BRAIN WITH CONTRAST INDICATION: Stroke Alert COMPARISON:   None. TECHNIQUE:   Post contrast imaging was performed after administration of iodinated contrast through the neck and brain. Post contrast axial 0.625 mm images timed to opacify the arterial system. 3D rendering was performed on an independent workstation.   MIP reconstructions performed. Coronal reconstructions were performed of the noncontrast portion of the brain. Radiation dose length product (DLP) for this visit:  600.96 mGy-cm .  This examination, like all CT scans performed in the [facility], was performed utilizing techniques to minimize radiation dose exposure, including the use of iterative  reconstruction and automated exposure control. IV Contrast:  85 mL of iohexol (OMNIPAQUE) IMAGE QUALITY:   Suboptimal opacification due to contrast bolus timing FINDINGS: CERVICAL VASCULATURE AORTIC ARCH AND GREAT VESSELS: Three-vessel configuration of the aortic arch. No stenosis in the subclavian arteries. RIGHT VERTEBRAL ARTERY CERVICAL SEGMENT: Origin not well visualized. Remainder of the cervical segment is patent and normal in caliber. LEFT VERTEBRAL ARTERY CERVICAL SEGMENT:  Normal origin. The vessel is normal in caliber throughout the neck. RIGHT EXTRACRANIAL CAROTID SEGMENT:  Normal caliber common carotid artery. Calcified plaque at the bifurcation and internal carotid artery origin without significant stenosis LEFT EXTRACRANIAL CAROTID SEGMENT:  Normal caliber carotid artery. Calcification at the bifurcation and internal carotid artery origin results in mild stenosis. NASCET criteria was used to determine the degree of internal carotid artery diameter stenosis. INTRACRANIAL VASCULATURE INTERNAL CAROTID ARTERIES: Calcified plaque in the carotid siphons without hemodynamically significant stenosis. ANTERIOR  CIRCULATION: Hypoplastic left A1 segment. Anterior communicating artery visualized. Distal branches not well evaluated due to suboptimal opacification and motion artifact. MIDDLE CEREBRAL ARTERY CIRCULATION: No stenosis in the M1 segments. Distal branches not diagnostically evaluated. DISTAL VERTEBRAL ARTERIES:  Normal distal vertebral arteries.  Posterior inferior cerebellar arteries not well visualized. BASILAR ARTERY:  Basilar artery is normal in caliber. Patent superior cerebellar arteries. POSTERIOR CEREBRAL ARTERIES: Fetal-type posterior cerebral arteries without evidence of proximal stenosis. Distal branches not well visualized. VENOUS STRUCTURES: Not diagnostically evaluated. NON VASCULAR ANATOMY BONY STRUCTURES: Severe degenerative change in the right shoulder. Questionable subchondral sclerosis in the humeral head. SOFT TISSUES OF THE NECK: No mass or lymphadenopathy. THORACIC INLET: Small bilateral pleural effusions and bibasilar atelectasis.     Impression: 1. Technically suboptimal exam. No evidence of large vessel occlusion or acute thrombus in the proximal portions of the major vessels of the [place]. 2. No evidence of hemodynamically significant stenosis or occlusion of the carotid or vertebral arteries. 3. Small bilateral pleural effusions and bibasilar atelectasis. Findings were directly discussed with Troy Vanessa at 5:55 PM . Workstation performed: [id number]         ** Please Note: Dragon 360 Dictation voice to text software was used in the creation of this document. **

## 2023-12-15 NOTE — ASSESSMENT & PLAN NOTE
Patient with elevated troponin on first determination however became within normal limits on second determination.  Most likely could be a consequence of infection.

## 2023-12-15 NOTE — CONSULTS
Consultation - Geriatric Medicine   Lucero Booth 88 y.o. female MRN: [id number]  Unit/Bed#: [facility] 713-01 Encounter: [id number]        Inpatient consult to Gerontology  Consult performed by: Michael Acharya MD  Consult ordered by: Navid Bentley MD            Assessment/Plan   1.-Stage IV decubitus ulcer.-Patient currently on cefepime and vancomycin.  Patient noted to have leukocytosis with a white count of 30,890.  Suspect osteomyelitis infectious disease has been consulted.  CT scan of abdomen and pelvis pending.  Will most likely need an MRI.        2.-Polypharmacy.-Patient noted to be on Xanax 0.5 mg daily at bedtime, Haldol 1 mg p.o. every 6 hours as needed for agitation according to nursing staff [facility] she was not given this medication in the past week, he had been on tramadol for pain control unfortunately this was not controlling her pain and was switched to Percocet but she never got to take the pill she was transferred to the hospital, lorazepam 1 mg every 6 hours as needed for anxiety not given in the past week, she does take Tylenol 325 mg i.e. 650 mg every 6 hours as needed for mild pain, amiodarone 200 mg orally daily, amlodipine-atorvastatin 5-10 mg tablet daily, Eliquis 2.5 mg twice daily, Voltaren gel, duloxetine 30 mg daily, iron sulfate 3 and 25 mg every 48 hours, Breo Ellipta 100-25 1 puff daily, Levsin 0.125 mg every 6 hours as needed for cramping, Zilactin eyedrops 0.005%, levothyroxine 25 mcg orally daily, lisinopril 10 mg orally daily, melatonin 3 mg orally at bedtime, Toprol succinate 100 mg orally daily, pseudoephedrine 60 mg orally every 6 hours as needed.    Plan.-Would recommend decreasing Xanax .025 milligrams orally.  X 1 week and then discontinuing medication.  Discontinue Haldol and lorazepam altogether would not resume as as needed medications when she returns to facility.    3.-Advanced liver fibrosis.-Patient has been followed by GI has had ultrasound elastography  performed in the past revealing F3 fibrosis and S1 liver steatosis.  Etiology felt to be secondary to ABRAHAM given the patient's metabolic risk factors.  No evidence noted of portal hypertension despite the presence of advanced fibrosis.  Patient should follow in 1 year with GI pathology.    4.-Hepatitis B antibody positive.-Found to have hepatitis B core IgM positivity additional hepatitis serologies were negative.  Patient had no risk factors for hepatitis B might be false positive would recommend repeat studies.    5.-Falls.-According to family patient has had 2 falls in the past 2 weeks out of bed.  They attribute this to increased pain discomfort that she was having at their facility and her lower back.    6.-Acute pain.-According to the family for the past few days she was having increased low back pain had been receiving tramadol for pain with poor control was seen by provider who switched her to Percocet however patient continued to decline and the patient was transferred to hospital.    7.-Decreased visual acuity.-Patient has had previous cataract surgeries unfortunately visual acuity did not improve.  She was also noted to have evidence of glaucoma and currently is receiving eyedrops.  Patient's decreased visual acuity will contribute to cognitive decline.    8.-Decreased hearing acuity.-Family has noticed that the television usually needs to be at a higher volume and they do need to pronounce at a higher tone for her to hear appropriately.    9.-Hyponatremia.-Rule out SIADH will check urine sodium urine and serum osmolality.  According to family she has had low sodiums previously.  Also be secondary to her underlying liver dysfunction.    10.-Ambulatory dysfunction.-Patient had low back surgery in [place] over a year ago they did notice that the patient's ambulation markedly declined after her surgery.  Family also suspects that she developed her initial sacral decubiti as an outpatient when she was living  at home by herself.  Subsequently the patient was placed in a facility for rehabilitation in [place] and her decubiti progressed and her ambulation markedly declined.  Basically she was confined to her bed and chair.  They moved the patient to [facility] [place] by [facility] in September so she could be closer to family.    11.-Hypothyroidism.-Patient currently on levothyroxine 25 mcg orally daily patient's last TSH 0.929 this was done yesterday within normal range.    12.-History of atrial fibrillation.-Patient has been on Eliquis 2.5 mg orally twice a day    13.-Obesity.-Patient's BMI 39.70 kg/m²    14.-Primary hypertension.-Patient been taking amlodipine 5 mg orally daily, metoprolol succinate 100 mg orally daily, and lisinopril 10 mg orally daily.    15.-Major depressive disorder.-Patient has been on Cymbalta 30 mg orally daily    16.-Pseudoaneurysm arising from the proximal left superficial femoral artery.-A 25 mm thin negative pseudoaneurysm was noted on a CTA performed on November 2, 2023.    17.-4 cm fluid collection within the left inguinal soft tissue.-This was noted on November 2, 2023 it was medial to the pseudoaneurysm and it was within the inguinal soft tissue.  Hematoma with suspected superinfection difficult to exclude.    18.-Abnormal CT scan of sacrum and coccyx.-Irregularity and sclerosis of the distal sacrum and coccyx suspicious for osteomyelitis.  Consider MRI.    19.-Right ventral hernia containing a nonobstructed loop of transverse colon.    20.-Chronic renal insufficiency stage IIIa.-Patient's current GFR is 53 patient's creatinine 0.96    21.-Allergies to cephalosporins    22.-History of bilateral cataract surgery    23.-Cognitive decline.-Multiple sources for her deterioration which include infection, dehydration, polypharmacy, uncontrolled pain, decreased visual acuity, decreased hearing acuity, electrolyte imbalance.  Would avoid antipsychotics and benzodiazepines.  Patient had been on long-term  Xanax would taper medication and discontinue.    24.-Low albumin and protein.-Patient needs to be assisted with feedings she was able to eat part of her dinner when I evaluated her as long as she was assisted.  Even though the patient is obese she is most likely malnourished given her low albumin and protein levels.  Would check a prealbumin level.    25.-Urine and stool incontinence.-Patient has been incontinent for close to a year.    26.-Leukocytosis.-Most likely secondary to her underlying osteomyelitis.    27.-Thrombocytopenia.-Patient's platelet count ranges between 106,000-134,000.    28.-Anemia with normochromic normocytic indices.-Patient's hemoglobin currently is 11.1 with a hematocrit of 37.5.           History of Present Illness   Physician Requesting Consult: Edenilson Kramer DO  Reason for Consult / Principal Problem: Geriatric evaluation-cognitive decline  Hx and PE limited by: No limitations  Additional history obtained from: Spoke with daughter Aundrea Booth telephone number [phone] she was able to give me an excellent history on her mom's current status.  She is a power of health for her mother her other sister Dena mosqueda apparently does more of the financing for her mom.      HPI: Lucero Booth is a 88 y.o. year old female who according to daughter was living in [place] till recently.  Patient had low back surgery performed a little more than a year ago.  Patient began to have increased difficulty taking care of herself.  She developed a sacral decubiti that continued to progress.  At that point she was still able to ambulate with the assistance of a walker but she began to lose her capability of taking care of herself.  She was placed in a nursing facility in [place] for the daughter states that her sacral decubiti worsened and the patient no longer was able to ambulate with a walker and was basically confined to bed and chair.  Patient was transferred to [facility]  past September so she could be closer to her family.  Daughter visits her on a regular basis.  Wound management has been following her for her stage IV sacral decubiti.  They did notice a change over the past week her appetite decreased and she began to have increased back discomfort over the past 48 hours.  She had been on tramadol for pain control but she had breakthrough pain on that medication.  She was evaluated by physician who also noticed that the patient's condition was changing and the patient was referred to the emergency room.  It was felt initially that she had a stroke and was evaluated by neurology who felt that she did not have any acute event at present.  The patient was noted to have an acute metabolic encephalopathy she is on polypharmacy, she has a stage IV decubitus ulcer, marked leukocytosis, acute kidney insufficiency, hypertension, major depressive disorder, primary insomnia, anxiety, acquired hypothyroidism, elevated troponin on admission that normalized.  Patient also was recently evaluated by GI who felt she had fibrosis of her liver secondary to [place].  Patient has had ongoing chronic hyponatremia.  Patient no longer is able to take care of her basic activities of daily living she is incontinent of both stool and urine.  Patient has had 2 recent falls over the past 2 weeks there were falls from bed.  She has decreased visual acuity has had previous cataract surgeries, has history of glaucoma.  Has hearing loss bilaterally most likely sensorineural.  She has become more frail.    Review of Systems   Constitutional:  Positive for fatigue.   HENT:          Patient needs dentures has upper   Eyes:  Positive for visual disturbance.   Respiratory:  Positive for shortness of breath.    Gastrointestinal:         History of stool incontinence   Endocrine:        History of hypothyroidism   Genitourinary:         Patient with urinary incontinence had residual of 60 cc when measured.    Musculoskeletal:  Positive for arthralgias, back pain and gait problem.   Skin:  Positive for wound.        Sacral wound stage IV   Neurological:  Positive for weakness.   Psychiatric/Behavioral:  The patient is nervous/anxious.            Historical Information   Past medical history: Cirrhosis, hypothyroidism, asthma, atrial fibrillation, hypertension, chronic anemia, hyperlipidemia, anxiety, chronic renal insufficiency, anxiety, hyponatremia, pressure injury of sacral region stage IV, morbid obesity, bilateral lower extremity edema.  Patient has history of previous pacemaker placement.  Past surgical history:Patient has had two low back surgeries in [place], lateral cataract surgery, pacemaker placement.  Bilateral knee replacements, hysterectomy, appendectomy.  Social history: Currently living at [facility] she does have 2 daughters who are very attentive.  Family history: Unremarkable    Meds/Allergies   All current active meds have been reviewed.     Allergies   Allergen Reactions    Cephalosporins Other (See Comments)     Unknown       Objective   Vitals:    12/15/23 0751   BP: 156/72   Pulse: 70   Resp: 18   Temp: (!) 96.6 °F (35.9 °C)   SpO2: 94%       Intake/Output Summary (Last 24 hours) at 12/15/2023 0851  Last data filed at 12/15/2023 0235  Gross per 24 hour   Intake 1285 ml   Output --   Net 1285 ml     Invasive Devices       Peripheral Intravenous Line  Duration             Peripheral IV 12/14/23 Dorsal (posterior);Left Forearm <1 day    Peripheral IV 12/14/23 Right Antecubital <1 day                    Physical Exam  Constitutional:       Appearance: She is obese.   HENT:      Head: Normocephalic and atraumatic.      Right Ear: Ear canal and external ear normal.      Left Ear: Ear canal and external ear normal.      Nose: Nose normal.      Mouth/Throat:      Mouth: Mucous membranes are dry.   Eyes:      Pupils: Pupils are equal, round, and reactive to light.   Cardiovascular:      Rate  and Rhythm: Normal rate. Rhythm irregular.      Pulses: Normal pulses.      Heart sounds: Normal heart sounds.   Pulmonary:      Breath sounds: Normal breath sounds.   Abdominal:      General: Bowel sounds are normal.   Musculoskeletal:      Cervical back: Neck supple.      Comments: Patient was able to move all 4 extremities   Skin:     General: Skin is dry.   Neurological:      Mental Status: She is oriented to person, place, and time. Mental status is at baseline.   Psychiatric:         Mood and Affect: Mood normal.         Thought Content: Thought content normal.         Lab Results:   I have personally reviewed pertinent lab and imaging results.     VTE Prophylaxis: Sequential compression device (Venodyne) patient also on Eliquis    Code Status: Level 2 - DNAR: but accepts endotracheal intubation  Advance Directive and Living Will:      Power of :    POLST:      Family and Social Support: Patient has excellent family support  No data recorded

## 2023-12-15 NOTE — ASSESSMENT & PLAN NOTE
Right upper extremity with erythema in the antecubital region  Cellulitis versus possible allergy from cefepime administration yesterday given patient's history of cephalosporin  Monitor closely  Continue vancomycin

## 2023-12-16 ENCOUNTER — APPOINTMENT (INPATIENT)
Dept: NON INVASIVE DIAGNOSTICS | Facility: HOSPITAL | Age: 88
DRG: 871 | End: 2023-12-16
Payer: COMMERCIAL

## 2023-12-16 LAB
ALBUMIN SERPL BCP-MCNC: 1.9 G/DL (ref 3.5–5)
ALBUMIN SERPL BCP-MCNC: 1.9 G/DL (ref 3.5–5)
ALP SERPL-CCNC: 135 U/L (ref 34–104)
ALP SERPL-CCNC: 135 U/L (ref 34–104)
ALT SERPL W P-5'-P-CCNC: 13 U/L (ref 7–52)
ALT SERPL W P-5'-P-CCNC: 13 U/L (ref 7–52)
ANION GAP SERPL CALCULATED.3IONS-SCNC: 8 MMOL/L
ANION GAP SERPL CALCULATED.3IONS-SCNC: 8 MMOL/L
ANISOCYTOSIS BLD QL SMEAR: PRESENT
ANISOCYTOSIS BLD QL SMEAR: PRESENT
AORTIC ROOT: 3.4 CM
AORTIC ROOT: 3.4 CM
AORTIC VALVE MEAN VELOCITY: 12.4 M/S
AORTIC VALVE MEAN VELOCITY: 12.4 M/S
APICAL FOUR CHAMBER EJECTION FRACTION: 55 %
APICAL FOUR CHAMBER EJECTION FRACTION: 55 %
AST SERPL W P-5'-P-CCNC: 42 U/L (ref 13–39)
AST SERPL W P-5'-P-CCNC: 42 U/L (ref 13–39)
AV AREA BY CONTINUOUS VTI: 1.6 CM2
AV AREA BY CONTINUOUS VTI: 1.6 CM2
AV AREA PEAK VELOCITY: 1.7 CM2
AV AREA PEAK VELOCITY: 1.7 CM2
AV LVOT MEAN GRADIENT: 2 MMHG
AV LVOT MEAN GRADIENT: 2 MMHG
AV LVOT PEAK GRADIENT: 4 MMHG
AV LVOT PEAK GRADIENT: 4 MMHG
AV MEAN GRADIENT: 6 MMHG
AV MEAN GRADIENT: 6 MMHG
AV PEAK GRADIENT: 10 MMHG
AV PEAK GRADIENT: 10 MMHG
AV VALVE AREA: 1.56 CM2
AV VALVE AREA: 1.56 CM2
AV VELOCITY RATIO: 0.66
AV VELOCITY RATIO: 0.66
BASOPHILS # BLD MANUAL: 0 THOUSAND/UL (ref 0–0.1)
BASOPHILS # BLD MANUAL: 0 THOUSAND/UL (ref 0–0.1)
BASOPHILS NFR MAR MANUAL: 0 % (ref 0–1)
BASOPHILS NFR MAR MANUAL: 0 % (ref 0–1)
BILIRUB SERPL-MCNC: 0.9 MG/DL (ref 0.2–1)
BILIRUB SERPL-MCNC: 0.9 MG/DL (ref 0.2–1)
BUN SERPL-MCNC: 36 MG/DL (ref 5–25)
BUN SERPL-MCNC: 36 MG/DL (ref 5–25)
CALCIUM ALBUM COR SERPL-MCNC: 9.3 MG/DL (ref 8.3–10.1)
CALCIUM ALBUM COR SERPL-MCNC: 9.3 MG/DL (ref 8.3–10.1)
CALCIUM SERPL-MCNC: 7.6 MG/DL (ref 8.4–10.2)
CALCIUM SERPL-MCNC: 7.6 MG/DL (ref 8.4–10.2)
CHLORIDE SERPL-SCNC: 105 MMOL/L (ref 96–108)
CHLORIDE SERPL-SCNC: 105 MMOL/L (ref 96–108)
CO2 SERPL-SCNC: 23 MMOL/L (ref 21–32)
CO2 SERPL-SCNC: 23 MMOL/L (ref 21–32)
CREAT SERPL-MCNC: 0.97 MG/DL (ref 0.6–1.3)
CREAT SERPL-MCNC: 0.97 MG/DL (ref 0.6–1.3)
DOP CALC AO PEAK VEL: 1.58 M/S
DOP CALC AO PEAK VEL: 1.58 M/S
DOP CALC AO VTI: 34.8 CM
DOP CALC AO VTI: 34.8 CM
DOP CALC LVOT AREA: 2.54 CM2
DOP CALC LVOT AREA: 2.54 CM2
DOP CALC LVOT CARDIAC INDEX: 1.78 L/MIN/M2
DOP CALC LVOT CARDIAC INDEX: 1.78 L/MIN/M2
DOP CALC LVOT CARDIAC OUTPUT: 3.61 L/MIN
DOP CALC LVOT CARDIAC OUTPUT: 3.61 L/MIN
DOP CALC LVOT DIAMETER: 1.8 CM
DOP CALC LVOT DIAMETER: 1.8 CM
DOP CALC LVOT PEAK VEL VTI: 21.38 CM
DOP CALC LVOT PEAK VEL VTI: 21.38 CM
DOP CALC LVOT PEAK VEL: 1.05 M/S
DOP CALC LVOT PEAK VEL: 1.05 M/S
DOP CALC LVOT STROKE INDEX: 26.6 ML/M2
DOP CALC LVOT STROKE INDEX: 26.6 ML/M2
DOP CALC LVOT STROKE VOLUME: 54.38
DOP CALC LVOT STROKE VOLUME: 54.38
E WAVE DECELERATION TIME: 285 MS
E WAVE DECELERATION TIME: 285 MS
E/A RATIO: 0.59
E/A RATIO: 0.59
EOSINOPHIL # BLD MANUAL: 0 THOUSAND/UL (ref 0–0.4)
EOSINOPHIL # BLD MANUAL: 0 THOUSAND/UL (ref 0–0.4)
EOSINOPHIL NFR BLD MANUAL: 0 % (ref 0–6)
EOSINOPHIL NFR BLD MANUAL: 0 % (ref 0–6)
ERYTHROCYTE [DISTWIDTH] IN BLOOD BY AUTOMATED COUNT: 16.9 % (ref 11.6–15.1)
ERYTHROCYTE [DISTWIDTH] IN BLOOD BY AUTOMATED COUNT: 16.9 % (ref 11.6–15.1)
FRACTIONAL SHORTENING: 27 (ref 28–44)
FRACTIONAL SHORTENING: 27 (ref 28–44)
GFR SERPL CREATININE-BSD FRML MDRD: 53 ML/MIN/1.73SQ M
GFR SERPL CREATININE-BSD FRML MDRD: 53 ML/MIN/1.73SQ M
GIANT PLATELETS BLD QL SMEAR: PRESENT
GIANT PLATELETS BLD QL SMEAR: PRESENT
GLUCOSE SERPL-MCNC: 102 MG/DL (ref 65–140)
GLUCOSE SERPL-MCNC: 102 MG/DL (ref 65–140)
HCT VFR BLD AUTO: 29.7 % (ref 34.8–46.1)
HCT VFR BLD AUTO: 29.7 % (ref 34.8–46.1)
HELMET CELLS BLD QL SMEAR: PRESENT
HELMET CELLS BLD QL SMEAR: PRESENT
HGB BLD-MCNC: 9.6 G/DL (ref 11.5–15.4)
HGB BLD-MCNC: 9.6 G/DL (ref 11.5–15.4)
INTERVENTRICULAR SEPTUM IN DIASTOLE (PARASTERNAL SHORT AXIS VIEW): 1.4 CM
INTERVENTRICULAR SEPTUM IN DIASTOLE (PARASTERNAL SHORT AXIS VIEW): 1.4 CM
INTERVENTRICULAR SEPTUM: 1.4 CM (ref 0.6–1.1)
INTERVENTRICULAR SEPTUM: 1.4 CM (ref 0.6–1.1)
LAAS-AP2: 21.4 CM2
LAAS-AP2: 21.4 CM2
LAAS-AP4: 13.8 CM2
LAAS-AP4: 13.8 CM2
LEFT ATRIUM AREA SYSTOLE SINGLE PLANE A4C: 13.8 CM2
LEFT ATRIUM AREA SYSTOLE SINGLE PLANE A4C: 13.8 CM2
LEFT ATRIUM VOLUME (MOD BIPLANE): 42 ML
LEFT ATRIUM VOLUME (MOD BIPLANE): 42 ML
LEFT ATRIUM VOLUME INDEX (MOD BIPLANE): 20.7 ML/M2
LEFT ATRIUM VOLUME INDEX (MOD BIPLANE): 20.7 ML/M2
LEFT INTERNAL DIMENSION IN SYSTOLE: 3 CM (ref 2.1–4)
LEFT INTERNAL DIMENSION IN SYSTOLE: 3 CM (ref 2.1–4)
LEFT VENTRICULAR INTERNAL DIMENSION IN DIASTOLE: 4.1 CM (ref 3.5–6)
LEFT VENTRICULAR INTERNAL DIMENSION IN DIASTOLE: 4.1 CM (ref 3.5–6)
LEFT VENTRICULAR POSTERIOR WALL IN END DIASTOLE: 1.4 CM
LEFT VENTRICULAR POSTERIOR WALL IN END DIASTOLE: 1.4 CM
LEFT VENTRICULAR STROKE VOLUME: 37 ML
LEFT VENTRICULAR STROKE VOLUME: 37 ML
LVSV (TEICH): 37 ML
LVSV (TEICH): 37 ML
LYMPHOCYTES # BLD AUTO: 0.81 THOUSAND/UL (ref 0.6–4.47)
LYMPHOCYTES # BLD AUTO: 0.81 THOUSAND/UL (ref 0.6–4.47)
LYMPHOCYTES # BLD AUTO: 2 % (ref 14–44)
LYMPHOCYTES # BLD AUTO: 2 % (ref 14–44)
MACROCYTES BLD QL AUTO: PRESENT
MACROCYTES BLD QL AUTO: PRESENT
MCH RBC QN AUTO: 27.7 PG (ref 26.8–34.3)
MCH RBC QN AUTO: 27.7 PG (ref 26.8–34.3)
MCHC RBC AUTO-ENTMCNC: 32.3 G/DL (ref 31.4–37.4)
MCHC RBC AUTO-ENTMCNC: 32.3 G/DL (ref 31.4–37.4)
MCV RBC AUTO: 86 FL (ref 82–98)
MCV RBC AUTO: 86 FL (ref 82–98)
MONOCYTES # BLD AUTO: 1.08 THOUSAND/UL (ref 0–1.22)
MONOCYTES # BLD AUTO: 1.08 THOUSAND/UL (ref 0–1.22)
MONOCYTES NFR BLD: 4 % (ref 4–12)
MONOCYTES NFR BLD: 4 % (ref 4–12)
MRSA NOSE QL CULT: ABNORMAL
MV E'TISSUE VEL-LAT: 6 CM/S
MV E'TISSUE VEL-LAT: 6 CM/S
MV E'TISSUE VEL-SEP: 4 CM/S
MV E'TISSUE VEL-SEP: 4 CM/S
MV PEAK A VEL: 1 M/S
MV PEAK A VEL: 1 M/S
MV PEAK E VEL: 59 CM/S
MV PEAK E VEL: 59 CM/S
MV STENOSIS PRESSURE HALF TIME: 83 MS
MV STENOSIS PRESSURE HALF TIME: 83 MS
MV VALVE AREA P 1/2 METHOD: 2.65
MV VALVE AREA P 1/2 METHOD: 2.65
MYELOCYTES NFR BLD MANUAL: 1 % (ref 0–1)
MYELOCYTES NFR BLD MANUAL: 1 % (ref 0–1)
NEUTROPHILS # BLD MANUAL: 24.76 THOUSAND/UL (ref 1.85–7.62)
NEUTROPHILS # BLD MANUAL: 24.76 THOUSAND/UL (ref 1.85–7.62)
NEUTS BAND NFR BLD MANUAL: 16 % (ref 0–8)
NEUTS BAND NFR BLD MANUAL: 16 % (ref 0–8)
NEUTS SEG NFR BLD AUTO: 76 % (ref 43–75)
NEUTS SEG NFR BLD AUTO: 76 % (ref 43–75)
OVALOCYTES BLD QL SMEAR: PRESENT
OVALOCYTES BLD QL SMEAR: PRESENT
PLATELET # BLD AUTO: 161 THOUSANDS/UL (ref 149–390)
PLATELET # BLD AUTO: 161 THOUSANDS/UL (ref 149–390)
PLATELET BLD QL SMEAR: ADEQUATE
PLATELET BLD QL SMEAR: ADEQUATE
PMV BLD AUTO: 12.5 FL (ref 8.9–12.7)
PMV BLD AUTO: 12.5 FL (ref 8.9–12.7)
POIKILOCYTOSIS BLD QL SMEAR: PRESENT
POIKILOCYTOSIS BLD QL SMEAR: PRESENT
POLYCHROMASIA BLD QL SMEAR: PRESENT
POLYCHROMASIA BLD QL SMEAR: PRESENT
POTASSIUM SERPL-SCNC: 3.8 MMOL/L (ref 3.5–5.3)
POTASSIUM SERPL-SCNC: 3.8 MMOL/L (ref 3.5–5.3)
PROT SERPL-MCNC: 6.1 G/DL (ref 6.4–8.4)
PROT SERPL-MCNC: 6.1 G/DL (ref 6.4–8.4)
RBC # BLD AUTO: 3.46 MILLION/UL (ref 3.81–5.12)
RBC # BLD AUTO: 3.46 MILLION/UL (ref 3.81–5.12)
RBC MORPH BLD: PRESENT
RBC MORPH BLD: PRESENT
RIGHT VENTRICLE ID DIMENSION: 4 CM
RIGHT VENTRICLE ID DIMENSION: 4 CM
SL CV LEFT ATRIUM LENGTH A2C: 5.5 CM
SL CV LEFT ATRIUM LENGTH A2C: 5.5 CM
SL CV LV EF: 55
SL CV LV EF: 55
SL CV PED ECHO LEFT VENTRICLE DIASTOLIC VOLUME (MOD BIPLANE) 2D: 73 ML
SL CV PED ECHO LEFT VENTRICLE DIASTOLIC VOLUME (MOD BIPLANE) 2D: 73 ML
SL CV PED ECHO LEFT VENTRICLE SYSTOLIC VOLUME (MOD BIPLANE) 2D: 36 ML
SL CV PED ECHO LEFT VENTRICLE SYSTOLIC VOLUME (MOD BIPLANE) 2D: 36 ML
SODIUM SERPL-SCNC: 136 MMOL/L (ref 135–147)
SODIUM SERPL-SCNC: 136 MMOL/L (ref 135–147)
TARGETS BLD QL SMEAR: PRESENT
TARGETS BLD QL SMEAR: PRESENT
TR MAX PG: 28 MMHG
TR MAX PG: 28 MMHG
TR PEAK VELOCITY: 2.7 M/S
TR PEAK VELOCITY: 2.7 M/S
TRICUSPID VALVE PEAK REGURGITATION VELOCITY: 2.67 M/S
TRICUSPID VALVE PEAK REGURGITATION VELOCITY: 2.67 M/S
VANCOMYCIN TROUGH SERPL-MCNC: 21.8 UG/ML (ref 10–20)
VANCOMYCIN TROUGH SERPL-MCNC: 21.8 UG/ML (ref 10–20)
VARIANT LYMPHS # BLD AUTO: 1 %
VARIANT LYMPHS # BLD AUTO: 1 %
WBC # BLD AUTO: 26.91 THOUSAND/UL (ref 4.31–10.16)
WBC # BLD AUTO: 26.91 THOUSAND/UL (ref 4.31–10.16)

## 2023-12-16 PROCEDURE — 99232 SBSQ HOSP IP/OBS MODERATE 35: CPT | Performed by: STUDENT IN AN ORGANIZED HEALTH CARE EDUCATION/TRAINING PROGRAM

## 2023-12-16 PROCEDURE — 93306 TTE W/DOPPLER COMPLETE: CPT

## 2023-12-16 PROCEDURE — 85007 BL SMEAR W/DIFF WBC COUNT: CPT | Performed by: INTERNAL MEDICINE

## 2023-12-16 PROCEDURE — 80202 ASSAY OF VANCOMYCIN: CPT | Performed by: INTERNAL MEDICINE

## 2023-12-16 PROCEDURE — 99233 SBSQ HOSP IP/OBS HIGH 50: CPT | Performed by: INTERNAL MEDICINE

## 2023-12-16 PROCEDURE — 80053 COMPREHEN METABOLIC PANEL: CPT | Performed by: INTERNAL MEDICINE

## 2023-12-16 PROCEDURE — 87040 BLOOD CULTURE FOR BACTERIA: CPT | Performed by: INTERNAL MEDICINE

## 2023-12-16 PROCEDURE — 85027 COMPLETE CBC AUTOMATED: CPT | Performed by: INTERNAL MEDICINE

## 2023-12-16 RX ORDER — VANCOMYCIN HYDROCHLORIDE 1 G/200ML
1000 INJECTION, SOLUTION INTRAVENOUS EVERY 24 HOURS
Status: DISCONTINUED | OUTPATIENT
Start: 2023-12-17 | End: 2023-12-16

## 2023-12-16 RX ORDER — HYDROMORPHONE HCL IN WATER/PF 6 MG/30 ML
0.2 PATIENT CONTROLLED ANALGESIA SYRINGE INTRAVENOUS EVERY 4 HOURS PRN
Status: DISCONTINUED | OUTPATIENT
Start: 2023-12-16 | End: 2023-12-18

## 2023-12-16 RX ORDER — OXYCODONE HYDROCHLORIDE 5 MG/1
5 TABLET ORAL EVERY 4 HOURS PRN
Status: DISCONTINUED | OUTPATIENT
Start: 2023-12-16 | End: 2023-12-18

## 2023-12-16 RX ORDER — DULOXETIN HYDROCHLORIDE 30 MG/1
30 CAPSULE, DELAYED RELEASE ORAL DAILY
Status: DISCONTINUED | OUTPATIENT
Start: 2023-12-16 | End: 2023-12-21 | Stop reason: HOSPADM

## 2023-12-16 RX ORDER — CEFAZOLIN SODIUM 2 G/50ML
2000 SOLUTION INTRAVENOUS EVERY 8 HOURS
Status: DISCONTINUED | OUTPATIENT
Start: 2023-12-16 | End: 2023-12-18

## 2023-12-16 RX ORDER — FUROSEMIDE 10 MG/ML
40 INJECTION INTRAMUSCULAR; INTRAVENOUS ONCE
Status: CANCELLED | OUTPATIENT
Start: 2023-12-16

## 2023-12-16 RX ORDER — FUROSEMIDE 10 MG/ML
20 INJECTION INTRAMUSCULAR; INTRAVENOUS ONCE
Status: COMPLETED | OUTPATIENT
Start: 2023-12-16 | End: 2023-12-16

## 2023-12-16 RX ORDER — LANOLIN ALCOHOL/MO/W.PET/CERES
3 CREAM (GRAM) TOPICAL
Status: DISCONTINUED | OUTPATIENT
Start: 2023-12-16 | End: 2023-12-21 | Stop reason: HOSPADM

## 2023-12-16 RX ADMIN — LISINOPRIL 10 MG: 10 TABLET ORAL at 08:18

## 2023-12-16 RX ADMIN — METOPROLOL SUCCINATE 100 MG: 100 TABLET, EXTENDED RELEASE ORAL at 08:19

## 2023-12-16 RX ADMIN — FLUTICASONE FUROATE AND VILANTEROL TRIFENATATE 1 PUFF: 100; 25 POWDER RESPIRATORY (INHALATION) at 08:23

## 2023-12-16 RX ADMIN — Medication 2 G: at 08:23

## 2023-12-16 RX ADMIN — ATORVASTATIN CALCIUM 10 MG: 10 TABLET, FILM COATED ORAL at 08:19

## 2023-12-16 RX ADMIN — ACETAMINOPHEN 975 MG: 325 TABLET, FILM COATED ORAL at 14:54

## 2023-12-16 RX ADMIN — LATANOPROST 1 DROP: 50 SOLUTION/ DROPS OPHTHALMIC at 21:04

## 2023-12-16 RX ADMIN — Medication 1 APPLICATION: at 08:23

## 2023-12-16 RX ADMIN — CEFAZOLIN SODIUM 2000 MG: 2 SOLUTION INTRAVENOUS at 13:40

## 2023-12-16 RX ADMIN — APIXABAN 5 MG: 5 TABLET, FILM COATED ORAL at 08:19

## 2023-12-16 RX ADMIN — Medication 2 G: at 11:00

## 2023-12-16 RX ADMIN — OXYCODONE HYDROCHLORIDE 5 MG: 5 TABLET ORAL at 15:39

## 2023-12-16 RX ADMIN — OXYCODONE HYDROCHLORIDE 5 MG: 5 TABLET ORAL at 10:53

## 2023-12-16 RX ADMIN — HYDROMORPHONE HYDROCHLORIDE 0.2 MG: 0.2 INJECTION, SOLUTION INTRAMUSCULAR; INTRAVENOUS; SUBCUTANEOUS at 14:58

## 2023-12-16 RX ADMIN — AMIODARONE HYDROCHLORIDE 200 MG: 200 TABLET ORAL at 08:18

## 2023-12-16 RX ADMIN — DULOXETINE HYDROCHLORIDE 30 MG: 30 CAPSULE, DELAYED RELEASE ORAL at 14:53

## 2023-12-16 RX ADMIN — LIDOCAINE 5% 1 PATCH: 700 PATCH TOPICAL at 08:19

## 2023-12-16 RX ADMIN — APIXABAN 5 MG: 5 TABLET, FILM COATED ORAL at 17:34

## 2023-12-16 RX ADMIN — FERROUS SULFATE TAB 325 MG (65 MG ELEMENTAL FE) 325 MG: 325 (65 FE) TAB at 21:02

## 2023-12-16 RX ADMIN — ACETAMINOPHEN 975 MG: 325 TABLET, FILM COATED ORAL at 21:02

## 2023-12-16 RX ADMIN — HYDROMORPHONE HYDROCHLORIDE 0.2 MG: 0.2 INJECTION, SOLUTION INTRAMUSCULAR; INTRAVENOUS; SUBCUTANEOUS at 09:25

## 2023-12-16 RX ADMIN — Medication 3 MG: at 21:02

## 2023-12-16 RX ADMIN — AMLODIPINE BESYLATE 5 MG: 5 TABLET ORAL at 08:19

## 2023-12-16 RX ADMIN — Medication 2 G: at 17:38

## 2023-12-16 RX ADMIN — FUROSEMIDE 20 MG: 10 INJECTION, SOLUTION INTRAMUSCULAR; INTRAVENOUS at 10:54

## 2023-12-16 RX ADMIN — VANCOMYCIN HYDROCHLORIDE 750 MG: 750 INJECTION, SOLUTION INTRAVENOUS at 08:32

## 2023-12-16 RX ADMIN — Medication 2 G: at 21:03

## 2023-12-16 RX ADMIN — CEFAZOLIN SODIUM 2000 MG: 2 SOLUTION INTRAVENOUS at 20:59

## 2023-12-16 RX ADMIN — LEVOTHYROXINE SODIUM 25 MCG: 25 TABLET ORAL at 05:03

## 2023-12-16 RX ADMIN — Medication 2.5 MG: at 06:23

## 2023-12-16 RX ADMIN — ACETAMINOPHEN 975 MG: 325 TABLET, FILM COATED ORAL at 05:03

## 2023-12-16 NOTE — ASSESSMENT & PLAN NOTE
Right upper extremity with erythema in the antecubital region  Cellulitis versus possible allergy from cefepime administration yesterday given patient's history of cephalosporin  Monitor closely  Continue Ancef

## 2023-12-16 NOTE — PROGRESS NOTES
Progress Note - Infectious Disease   Lucero Booth 88 y.o. female MRN: [id number]  Unit/Bed#: [facility] 713-01 Encounter: [id number]      Impression/Recommendations:  Sepsis, POA.    WBC, RR.  Due to bacteremia.  No other appreciable source.  UA, flu/RSV/COVID PCR, CT C/A/P otherwise negative.  Improving.  Rec:  Continue antibiotics as below  Follow temperatures closely  Recheck WBC in AM to monitor infection  Supportive care as per the primary service     Group G Strep bacteremia.    Consider due to RUE cellulitis versus other skin source given multiple wounds as below.  Doubt relation to recent pseudoaneurysm given normal exam and imaging.  No indwelling intravascular devices.  Repeat blood cultures pending.  TTE (technically difficult) negative.  Rec:  Change antibiotics to high-dose cefazolin  Follow up repeat blood cultures  Anticipate hopeful transition to PO antibiotics once clinically improved     RUE cellulitis.    Unclear etiology.  Unusual location and distribution.  Rec:  Continue antibiotics as above  Serial exams     Left upper arm wound.  Possibly pressure wound due to overall appearance.  No apparent superinfection.  Rec:  LWC per nursing     Chronic sacral decubitus ulcer.  No apparent acute superinfection on photo review.  Consider chronic osteomyelitis given CT findings.  Rec:  LWC per nursing     Acute encephalopathy.  Suspect toxic metabolic due to sepsis as above.  CT head, CTA negative.  Improving.  Rec:  Continue antibiotics as above  Follow mental status closely     Recent L SFA pseudoaneurysm.  Thought iatrogenic from recent Micra pacer placement at OSH.  Status post thrombin injection 11/3/23 with resolution on repeat Doppler.  No noted abnormality on CT A/P this admission although non-contrast and exam benign.    Cirrhosis.  Thought due to ABRAHAM.  T. Bili slightly elevated which may be due to sepsis as above, now improved.  CT A/P unremarkable.     Status post spinal fusion.  Chronic back  pain which patient reports is stable.  Rec:  Consider MRI if back pain worsens     Cephalosporin allergy.  Unknown reaction.  Received cefepime in ED without issue.  Rec:  Trial of cefazolin as above    I discussed with Dr. Kramer the above plan to continue IV antibiotics.  He agrees with the plan.     Antibiotics:  Vancomycin #2    Subjective/24 Hour Events:  Patient seen on AM rounds.  More awake.  Complains of chronic low back/buttock pain.    Objective:  Vitals:  Temp:  [97.3 °F (36.3 °C)-97.6 °F (36.4 °C)] 97.6 °F (36.4 °C)  HR:  [62-71] 62  Resp:  [16-18] 18  BP: (123-148)/(54-67) 123/59  SpO2:  [92 %-96 %] 96 %  Temp (24hrs), Av.4 °F (36.3 °C), Min:97.3 °F (36.3 °C), Max:97.6 °F (36.4 °C)  Current: Temperature: 97.6 °F (36.4 °C)    Physical Exam:   General:  No acute distress  Psychiatric:  Awake and alert  Pulmonary:  Normal respiratory excursion without accessory muscle use  Abdomen:  Soft, nontender  Extremities:  Erythema RUE, no redness or swelling bilateral groins  Skin:  No rashes    Lab Results:  I have personally reviewed pertinent labs.  Results from last 7 days   Lab Units 12/16/23  0638 12/15/23  0447 12/14/23  1808   POTASSIUM mmol/L 3.8 3.4* 4.5   CHLORIDE mmol/L 105 100 102   CO2 mmol/L 23 21 20*   BUN mg/dL 36* 34* 35*   CREATININE mg/dL 0.97 0.96 0.90   EGFR ml/min/1.73sq m 53 53 58   CALCIUM mg/dL 7.6* 7.7* 7.1*   AST U/L 42* 28  --    ALT U/L 13 12  --    ALK PHOS U/L 135* 127*  --      Results from last 7 days   Lab Units 2338 12/15/23  0447 12/14/23  1917   WBC Thousand/uL 26.91* 30.89* 33.44*   HEMOGLOBIN g/dL 9.6* 10.2* 10.6*   PLATELETS Thousands/uL 161 154 169     Results from last 7 days   Lab Units 23  0638 23   BLOOD CULTURE  Received in Microbiology Lab. Culture in Progress.  Received in Microbiology Lab. Culture in Progress. Beta Hemolytic Streptococcus Group G*  Beta Hemolytic Streptococcus Group G*   GRAM STAIN RESULT   --  Gram positive cocci  in pairs and chains*  Gram positive cocci in pairs and chains*       Imaging Studies:   I have personally reviewed pertinent imaging study reports and images in PACS.    EKG, Pathology, and Other Studies:   I have personally reviewed pertinent reports.

## 2023-12-16 NOTE — ASSESSMENT & PLAN NOTE
Evidenced by tachypnea and leukocytosis  Blood cultures with GPC, likely streptococcal bacteremia  Possible source of right upper extremity cellulitis?  UA/flu/RSV/COVID unremarkable  CT CAP no other acute findings of infection aside from likely chronic osteomyelitis underlying sacral decubitus ulcer  Vancomycin discontinued  Started on high-dose cefazolin per ID

## 2023-12-16 NOTE — PLAN OF CARE
Problem: PAIN - ADULT  Goal: Verbalizes/displays adequate comfort level or baseline comfort level  Description: Interventions:  - Encourage patient to monitor pain and request assistance  - Assess pain using appropriate pain scale  - Administer analgesics based on type and severity of pain and evaluate response  - Implement non-pharmacological measures as appropriate and evaluate response  - Consider cultural and social influences on pain and pain management  - Notify physician/advanced practitioner if interventions unsuccessful or patient reports new pain  Outcome: Progressing     Problem: INFECTION - ADULT  Goal: Absence or prevention of progression during hospitalization  Description: INTERVENTIONS:  - Assess and monitor for signs and symptoms of infection  - Monitor lab/diagnostic results  - Monitor all insertion sites, i.e. indwelling lines, tubes, and drains  - Monitor endotracheal if appropriate and nasal secretions for changes in amount and color  - [place] appropriate cooling/warming therapies per order  - Administer medications as ordered  - Instruct and encourage patient and family to use good hand hygiene technique  - Identify and instruct in appropriate isolation precautions for identified infection/condition  Outcome: Progressing     Problem: SAFETY ADULT  Goal: Patient will remain free of falls  Description: INTERVENTIONS:  - Educate patient/family on patient safety including physical limitations  - Instruct patient to call for assistance with activity   - Consult OT/PT to assist with strengthening/mobility   - Keep Call bell within reach  - Keep bed low and locked with side rails adjusted as appropriate  - Keep care items and personal belongings within reach  - Initiate and maintain comfort rounds  - Make Fall Risk Sign visible to staff  - Apply yellow socks and bracelet for high fall risk patients  - Consider moving patient to room near nurses station  Outcome: Progressing     Problem: Knowledge  Deficit  Goal: Patient/family/caregiver demonstrates understanding of disease process, treatment plan, medications, and discharge instructions  Description: Complete learning assessment and assess knowledge base.  Interventions:  - Provide teaching at level of understanding  - Provide teaching via preferred learning methods  Outcome: Progressing     Problem: NEUROSENSORY - ADULT  Goal: Achieves stable or improved neurological status  Description: INTERVENTIONS  - Monitor and report changes in neurological status  - Monitor vital signs such as temperature, blood pressure, glucose, and any other labs ordered   - Initiate measures to prevent increased intracranial pressure  - Monitor for seizure activity and implement precautions if appropriate      Outcome: Progressing     Problem: CARDIOVASCULAR - ADULT  Goal: Maintains optimal cardiac output and hemodynamic stability  Description: INTERVENTIONS:  - Monitor I/O, vital signs and rhythm  - Monitor for S/S and trends of decreased cardiac output  - Administer and titrate ordered vasoactive medications to optimize hemodynamic stability  - Assess quality of pulses, skin color and temperature  - Assess for signs of decreased coronary artery perfusion  - Instruct patient to report change in severity of symptoms  Outcome: Progressing     Problem: RESPIRATORY - ADULT  Goal: Achieves optimal ventilation and oxygenation  Description: INTERVENTIONS:  - Assess for changes in respiratory status  - Assess for changes in mentation and behavior  - Position to facilitate oxygenation and minimize respiratory effort  - Oxygen administered by appropriate delivery if ordered  - Initiate smoking cessation education as indicated  - Encourage broncho-pulmonary hygiene including cough, deep breathe, Incentive Spirometry  - Assess the need for suctioning and aspirate as needed  - Assess and instruct to report SOB or any respiratory difficulty  - Respiratory Therapy support as  indicated  Outcome: Progressing     Problem: MUSCULOSKELETAL - ADULT  Goal: Maintain or return mobility to safest level of function  Description: INTERVENTIONS:  - Assess patient's ability to carry out ADLs; assess patient's baseline for ADL function and identify physical deficits which impact ability to perform ADLs (bathing, care of mouth/teeth, toileting, grooming, dressing, etc.)  - Assess/evaluate cause of self-care deficits   - Assess range of motion  - Assess patient's mobility  - Assess patient's need for assistive devices and provide as appropriate  - Encourage maximum independence but intervene and supervise when necessary  - Involve family in performance of ADLs  - Assess for home care needs following discharge   - Consider OT consult to assist with ADL evaluation and planning for discharge  - Provide patient education as appropriate  Outcome: Progressing

## 2023-12-16 NOTE — PLAN OF CARE
Problem: Prexisting or High Potential for Compromised Skin Integrity  Goal: Skin integrity is maintained or improved  Description: INTERVENTIONS:  - Identify patients at risk for skin breakdown  - Assess and monitor skin integrity  - Assess and monitor nutrition and hydration status  - Monitor labs   - Assess for incontinence   - Turn and reposition patient  - Assist with mobility/ambulation  - Relieve pressure over bony prominences  - Avoid friction and shearing  - Provide appropriate hygiene as needed including keeping skin clean and dry  - Evaluate need for skin moisturizer/barrier cream  - Collaborate with interdisciplinary team   - Patient/family teaching  - Consider wound care consult   Outcome: Progressing     Problem: PAIN - ADULT  Goal: Verbalizes/displays adequate comfort level or baseline comfort level  Description: Interventions:  - Encourage patient to monitor pain and request assistance  - Assess pain using appropriate pain scale  - Administer analgesics based on type and severity of pain and evaluate response  - Implement non-pharmacological measures as appropriate and evaluate response  - Consider cultural and social influences on pain and pain management  - Notify physician/advanced practitioner if interventions unsuccessful or patient reports new pain  Outcome: Progressing     Problem: INFECTION - ADULT  Goal: Absence or prevention of progression during hospitalization  Description: INTERVENTIONS:  - Assess and monitor for signs and symptoms of infection  - Monitor lab/diagnostic results  - Monitor all insertion sites, i.e. indwelling lines, tubes, and drains  - Monitor endotracheal if appropriate and nasal secretions for changes in amount and color  - [place] appropriate cooling/warming therapies per order  - Administer medications as ordered  - Instruct and encourage patient and family to use good hand hygiene technique  - Identify and instruct in appropriate isolation precautions for  identified infection/condition  Outcome: Progressing  Goal: Absence of fever/infection during neutropenic period  Description: INTERVENTIONS:  - Monitor WBC    Outcome: Progressing     Problem: SAFETY ADULT  Goal: Patient will remain free of falls  Description: INTERVENTIONS:  - Educate patient/family on patient safety including physical limitations  - Instruct patient to call for assistance with activity   - Consult OT/PT to assist with strengthening/mobility   - Keep Call bell within reach  - Keep bed low and locked with side rails adjusted as appropriate  - Keep care items and personal belongings within reach  - Initiate and maintain comfort rounds  - Make Fall Risk Sign visible to staff  - Offer Toileting every 2 Hours, in advance of need  - Initiate/Maintain bed alarm  - Apply yellow socks and bracelet for high fall risk patients  - Consider moving patient to room near nurses station  Outcome: Progressing  Goal: Maintain or return to baseline ADL function  Description: INTERVENTIONS:  -  Assess patient's ability to carry out ADLs; assess patient's baseline for ADL function and identify physical deficits which impact ability to perform ADLs (bathing, care of mouth/teeth, toileting, grooming, dressing, etc.)  - Assess/evaluate cause of self-care deficits   - Assess range of motion  - Assess patient's mobility; develop plan if impaired  - Assess patient's need for assistive devices and provide as appropriate  - Encourage maximum independence but intervene and supervise when necessary  - Involve family in performance of ADLs  - Assess for home care needs following discharge   - Consider OT consult to assist with ADL evaluation and planning for discharge  - Provide patient education as appropriate  Outcome: Progressing  Goal: Maintains/Returns to pre admission functional level  Description: INTERVENTIONS:  - Perform AM-PAC 6 Click Basic Mobility/ Daily Activity assessment daily.  - Set and communicate daily mobility  goal to care team and patient/family/caregiver.   - Collaborate with rehabilitation services on mobility goals if consulted  - Perform Range of Motion 2 times a day.  - Reposition patient every 2 hours.  - Dangle patient 2 times a day  - Stand patient 2 times a day  - Ambulate patient 2 times a day  - Out of bed to chair 2 times a day   - Out of bed for meals 2 times a day  - Out of bed for toileting  - Record patient progress and toleration of activity level   Outcome: Progressing     Problem: DISCHARGE PLANNING  Goal: Discharge to home or other facility with appropriate resources  Description: INTERVENTIONS:  - Identify barriers to discharge w/patient and caregiver  - Arrange for needed discharge resources and transportation as appropriate  - Identify discharge learning needs (meds, wound care, etc.)  - Arrange for interpretive services to assist at discharge as needed  - Refer to Case Management Department for coordinating discharge planning if the patient needs post-hospital services based on physician/advanced practitioner order or complex needs related to functional status, cognitive ability, or social support system  Outcome: Progressing     Problem: Knowledge Deficit  Goal: Patient/family/caregiver demonstrates understanding of disease process, treatment plan, medications, and discharge instructions  Description: Complete learning assessment and assess knowledge base.  Interventions:  - Provide teaching at level of understanding  - Provide teaching via preferred learning methods  Outcome: Progressing     Problem: NEUROSENSORY - ADULT  Goal: Achieves stable or improved neurological status  Description: INTERVENTIONS  - Monitor and report changes in neurological status  - Monitor vital signs such as temperature, blood pressure, glucose, and any other labs ordered   - Initiate measures to prevent increased intracranial pressure  - Monitor for seizure activity and implement precautions if appropriate       Outcome: Progressing  Goal: Remains free of injury related to seizures activity  Description: INTERVENTIONS  - Maintain airway, patient safety  and administer oxygen as ordered  - Monitor patient for seizure activity, document and report duration and description of seizure to physician/advanced practitioner  - If seizure occurs,  ensure patient safety during seizure  - Reorient patient post seizure  - Seizure pads on all 4 side rails  - Instruct patient/family to notify RN of any seizure activity including if an aura is experienced  - Instruct patient/family to call for assistance with activity based on nursing assessment  - Administer anti-seizure medications if ordered    Outcome: Progressing  Goal: Achieves maximal functionality and self care  Description: INTERVENTIONS  - Monitor swallowing and airway patency with patient fatigue and changes in neurological status  - Encourage and assist patient to increase activity and self care.   - Encourage visually impaired, hearing impaired and aphasic patients to use assistive/communication devices  Outcome: Progressing     Problem: CARDIOVASCULAR - ADULT  Goal: Maintains optimal cardiac output and hemodynamic stability  Description: INTERVENTIONS:  - Monitor I/O, vital signs and rhythm  - Monitor for S/S and trends of decreased cardiac output  - Administer and titrate ordered vasoactive medications to optimize hemodynamic stability  - Assess quality of pulses, skin color and temperature  - Assess for signs of decreased coronary artery perfusion  - Instruct patient to report change in severity of symptoms  Outcome: Progressing  Goal: Absence of cardiac dysrhythmias or at baseline rhythm  Description: INTERVENTIONS:  - Continuous cardiac monitoring, vital signs, obtain 12 lead EKG if ordered  - Administer antiarrhythmic and heart rate control medications as ordered  - Monitor electrolytes and administer replacement therapy as ordered  Outcome: Progressing     Problem:  RESPIRATORY - ADULT  Goal: Achieves optimal ventilation and oxygenation  Description: INTERVENTIONS:  - Assess for changes in respiratory status  - Assess for changes in mentation and behavior  - Position to facilitate oxygenation and minimize respiratory effort  - Oxygen administered by appropriate delivery if ordered  - Initiate smoking cessation education as indicated  - Encourage broncho-pulmonary hygiene including cough, deep breathe, Incentive Spirometry  - Assess the need for suctioning and aspirate as needed  - Assess and instruct to report SOB or any respiratory difficulty  - Respiratory Therapy support as indicated  Outcome: Progressing     Problem: MUSCULOSKELETAL - ADULT  Goal: Maintain or return mobility to safest level of function  Description: INTERVENTIONS:  - Assess patient's ability to carry out ADLs; assess patient's baseline for ADL function and identify physical deficits which impact ability to perform ADLs (bathing, care of mouth/teeth, toileting, grooming, dressing, etc.)  - Assess/evaluate cause of self-care deficits   - Assess range of motion  - Assess patient's mobility  - Assess patient's need for assistive devices and provide as appropriate  - Encourage maximum independence but intervene and supervise when necessary  - Involve family in performance of ADLs  - Assess for home care needs following discharge   - Consider OT consult to assist with ADL evaluation and planning for discharge  - Provide patient education as appropriate  Outcome: Progressing  Goal: Maintain proper alignment of affected body part  Description: INTERVENTIONS:  - Support, maintain and protect limb and body alignment  - Provide patient/ family with appropriate education  Outcome: Progressing     Problem: Nutrition/Hydration-ADULT  Goal: Nutrient/Hydration intake appropriate for improving, restoring or maintaining nutritional needs  Description: Monitor and assess patient's nutrition/hydration status for malnutrition.  Collaborate with interdisciplinary team and initiate plan and interventions as ordered.  Monitor patient's weight and dietary intake as ordered or per policy. Utilize nutrition screening tool and intervene as necessary. Determine patient's food preferences and provide high-protein, high-caloric foods as appropriate.     INTERVENTIONS:  - Monitor oral intake, urinary output, labs, and treatment plans  - Assess nutrition and hydration status and recommend course of action  - Evaluate amount of meals eaten  - Assist patient with eating if necessary   - Allow adequate time for meals  - Recommend/ encourage appropriate diets, oral nutritional supplements, and vitamin/mineral supplements  - Order, calculate, and assess calorie counts as needed  - Recommend, monitor, and adjust tube feedings and TPN/PPN based on assessed needs  - Assess need for intravenous fluids  - Provide specific nutrition/hydration education as appropriate  - Include patient/family/caregiver in decisions related to nutrition  Outcome: Progressing

## 2023-12-16 NOTE — UTILIZATION REVIEW
Date: 12/16    Day 3: Has surpassed a 2nd midnight with active treatments and services, which include tx of sepsis with continued IV abx and continued w/u following repeat blood cxs, MARIANA to r/o endocarditis.

## 2023-12-16 NOTE — ASSESSMENT & PLAN NOTE
At home patient takes melatonin, Ativan and Xanax?  Holding Ativan and Xanax  Resume melatonin at bedtime

## 2023-12-16 NOTE — ASSESSMENT & PLAN NOTE
Patient was required 2L NC overnight and has escalated to 6 L nasal on 12/15  Status post IV Lasix 20 mg x 1 yesterday, currently on 5 L nasal cannula and denies any shortness of breath  Likely secondary to bilateral pleural effusion noted on CT scan  Received IV fluids on admission for sepsis  Continue holding IVF  IV Lasix 20 mg x 1 again today

## 2023-12-16 NOTE — ASSESSMENT & PLAN NOTE
Wound care management  Surgery consulted, no acute intervention recommended  CT showing signs of underlying osteomyelitis though chronicity is uncertain

## 2023-12-16 NOTE — ASSESSMENT & PLAN NOTE
Initially presenting with strokelike symptoms with right-sided facial droop and generalized weakness with slurred speech.    CT head and CTA head and neck unremarkable for any acute stroke  MRI deferred by neurology  Neurology suspects metabolic encephalopathy in setting of sepsis  Holding home Haldol and Xanax at this time given encephalopathy  12/16: Mentation improving, patient is more alert, oriented and following commands.  Continue treatment of sepsis.  See additional management per sepsis A/P

## 2023-12-16 NOTE — CONSULTS
Vancomycin IV Pharmacy-to-Dose Consultation     Vancomycin has been discontinued.  Pharmacy will sign off.  Please contact or re-consult with questions.    Mamta Pryor, Pharmacist

## 2023-12-16 NOTE — PROGRESS NOTES
[facility]  Progress Note  Name: Lucero Booth I  MRN: [id number]  Unit/Bed#: [facility] 713-01 I Date of Admission: 12/14/2023   Date of Service: 12/16/2023 I Hospital Day: 2    Assessment/Plan   * Sepsis (HCC)  Assessment & Plan  Evidenced by tachypnea and leukocytosis  Blood cultures with GPC, likely streptococcal bacteremia  Possible source of right upper extremity cellulitis?  UA/flu/RSV/COVID unremarkable  CT CAP no other acute findings of infection aside from likely chronic osteomyelitis underlying sacral decubitus ulcer  Vancomycin discontinued  Started on high-dose cefazolin per ID    Acute metabolic encephalopathy  Assessment & Plan  Initially presenting with strokelike symptoms with right-sided facial droop and generalized weakness with slurred speech.    CT head and CTA head and neck unremarkable for any acute stroke  MRI deferred by neurology  Neurology suspects metabolic encephalopathy in setting of sepsis  Holding home Haldol and Xanax at this time given encephalopathy  12/16: Mentation improving, patient is more alert, oriented and following commands.  Continue treatment of sepsis.  See additional management per sepsis A/P      Acute respiratory failure (HCC)  Assessment & Plan  Patient was required 2L NC overnight and has escalated to 6 L nasal on 12/15  Status post IV Lasix 20 mg x 1 yesterday, currently on 5 L nasal cannula and denies any shortness of breath  Likely secondary to bilateral pleural effusion noted on CT scan  Received IV fluids on admission for sepsis  Continue holding IVF  IV Lasix 20 mg x 1 again today    Rash  Assessment & Plan  Right upper extremity with erythema in the antecubital region  Cellulitis versus possible allergy from cefepime administration yesterday given patient's history of cephalosporin  Monitor closely  Continue Ancef    Bacteremia  Assessment & Plan  Streptococcal bacteremia, unclear etiology.  Possible right upper extremity  cellulitis as source.  TTE (limited) negative for endocarditis  Discontinue vancomycin and started on Ancef  ID following, appreciate commendations  Follow-up repeat blood cultures obtained on 12/16    Acquired hypothyroidism  Assessment & Plan  Continue levothyroxine 25 mcg.    Primary insomnia  Assessment & Plan  At home patient takes melatonin, Ativan and Xanax?  Holding Ativan and Xanax  Resume melatonin at bedtime    Major depressive disorder, recurrent, in remission, unspecified (HCC)  Assessment & Plan  Patient on haloperidol which is currently on hold.  Resume Cymbalta    Primary hypertension  Assessment & Plan  Blood pressure stable  Continue amlodipine 5 mg daily  Continue lisinopril 10 mg daily  Continue Toprol 100    Ambulatory dysfunction  Assessment & Plan  Patient is unable to walk however is able to at least to pivot and stand from bed.  Physical and Occupational Therapy consult, case management.    Degenerative disc disease, thoracic  Assessment & Plan  Physical and Occupational Therapy consults with case management.    Polypharmacy  Assessment & Plan  Patient is on multiple medications including pain medications, sedatives, antidepressants.    Patient's daughter is in the room and has been advised regarding discontinuation of these medications so that we can fully assess improvement of her mental status.  Resume as able    Stage IV decubitus ulcer (HCC)  Assessment & Plan  Wound care management  Surgery consulted, no acute intervention recommended  CT showing signs of underlying osteomyelitis though chronicity is uncertain    Stroke-like symptoms  Assessment & Plan  Presented with right facial droop and slurred speech  CT head without any acute abnormality  CTA head and neck showed no large vessel occlusion or acute thrombus but it was a difficult study  Neurology consulted for suspected metabolic encephalopathy in the setting of sepsis  Stroke pathway discontinued  MRI deferred           VTE  Pharmacologic Prophylaxis:   Moderate Risk (Score 3-4) - Pharmacological DVT Prophylaxis Ordered: apixaban (Eliquis).    Mobility:   Basic Mobility Inpatient Raw Score: 6  JH-HLM Goal: 2: Bed activities/Dependent transfer  JH-HLM Achieved: 2: Bed activities/Dependent transfer  HLM Goal achieved. Continue to encourage appropriate mobility.    Patient Centered Rounds: I performed bedside rounds with nursing staff today.   Discussions with Specialists or Other Care Team Provider: ID    Education and Discussions with Family / Patient: Updated  (daughter) via phone.    Total Time Spent on Date of Encounter in care of patient: 25 mins. This time was spent on one or more of the following: performing physical exam; counseling and coordination of care; obtaining or reviewing history; documenting in the medical record; reviewing/ordering tests, medications or procedures; communicating with other healthcare professionals and discussing with patient's family/caregivers.    Current Length of Stay: 2 day(s)  Current Patient Status: Inpatient   Certification Statement: The patient will continue to require additional inpatient hospital stay due to IV antibiotics and encephalopathy  Discharge Plan: Anticipate discharge in 48-72 hrs to rehab facility.    Code Status: Level 2 - DNAR: but accepts endotracheal intubation    Subjective:   Complains of low back pain near sacral wound. Oriented to self, year. Following commands and answering questions appropriately.     Objective:     Vitals:   Temp (24hrs), Av.4 °F (36.3 °C), Min:97.3 °F (36.3 °C), Max:97.6 °F (36.4 °C)    Temp:  [97.3 °F (36.3 °C)-97.6 °F (36.4 °C)] 97.6 °F (36.4 °C)  HR:  [62-71] 62  Resp:  [16-18] 18  BP: (123-148)/(54-67) 123/59  SpO2:  [92 %-96 %] 96 %  Body mass index is 39.68 kg/m².     Input and Output Summary (last 24 hours):     Intake/Output Summary (Last 24 hours) at 2023 1338  Last data filed at 2023 1135  Gross per 24 hour    Intake 50 ml   Output 395 ml   Net -345 ml       Physical Exam:   Physical Exam  Vitals reviewed.   Constitutional:       General: She is not in acute distress.     Appearance: She is ill-appearing.   HENT:      Head: Normocephalic and atraumatic.   Cardiovascular:      Rate and Rhythm: Normal rate and regular rhythm.   Pulmonary:      Effort: Pulmonary effort is normal. No respiratory distress.      Comments: Decreased breath sounds bilaterally  5L  Abdominal:      General: Bowel sounds are normal.      Tenderness: There is no abdominal tenderness.   Musculoskeletal:      Right lower leg: Edema present.      Left lower leg: Edema present.   Neurological:      Mental Status: She is alert. Mental status is at baseline. She is disoriented.          Additional Data:     Labs:  Results from last 7 days   Lab Units 12/16/23  0638   WBC Thousand/uL 26.91*   HEMOGLOBIN g/dL 9.6*   HEMATOCRIT % 29.7*   PLATELETS Thousands/uL 161   BANDS PCT % 16*   LYMPHO PCT % 2*   MONO PCT % 4   EOS PCT % 0     Results from last 7 days   Lab Units 12/16/23  0638   SODIUM mmol/L 136   POTASSIUM mmol/L 3.8   CHLORIDE mmol/L 105   CO2 mmol/L 23   BUN mg/dL 36*   CREATININE mg/dL 0.97   ANION GAP mmol/L 8   CALCIUM mg/dL 7.6*   ALBUMIN g/dL 1.9*   TOTAL BILIRUBIN mg/dL 0.90   ALK PHOS U/L 135*   ALT U/L 13   AST U/L 42*   GLUCOSE RANDOM mg/dL 102     Results from last 7 days   Lab Units 12/15/23  0447   INR  2.09*     Results from last 7 days   Lab Units 12/14/23  1708   POC GLUCOSE mg/dl 182*         Results from last 7 days   Lab Units 12/14/23  2016   LACTIC ACID mmol/L 1.6       Lines/Drains:  Invasive Devices       Peripheral Intravenous Line  Duration             Peripheral IV 12/14/23 Dorsal (posterior);Left Forearm 1 day                          Imaging: No pertinent imaging reviewed.    Recent Cultures (last 7 days):   Results from last 7 days   Lab Units 12/16/23  0638 12/14/23 2016   BLOOD CULTURE  Received in Microbiology Lab.  Culture in Progress.  Received in Microbiology Lab. Culture in Progress. Beta Hemolytic Streptococcus Group G*  Beta Hemolytic Streptococcus Group G*   GRAM STAIN RESULT   --  Gram positive cocci in pairs and chains*  Gram positive cocci in pairs and chains*       Last 24 Hours Medication List:   Current Facility-Administered Medications   Medication Dose Route Frequency Provider Last Rate    acetaminophen  975 mg Oral Q8H [facility] Edenilson Kramer, DO      aluminum-magnesium hydroxide-simethicone  30 mL Oral Q6H PRN Aurelio Kelly MD      amiodarone  200 mg Oral Daily Aurelio Kelly MD      amLODIPine  5 mg Oral Daily Aurelio Kelly MD      And    atorvastatin  10 mg Oral Daily Aurelio Kelly MD      ammonium lactate  1 Application Topical Daily Aurelio Kelly MD      apixaban  5 mg Oral BID Edenilson Kramer, DO      cefazolin  2,000 mg Intravenous Q8H Darcy Rushing MD      Diclofenac Sodium  2 g Topical 4x Daily Aurelio Kelly MD      docusate sodium  100 mg Oral BID PRN Aurelio Kelly MD      DULoxetine  30 mg Oral Daily Edenilson Kramer, DO      ferrous sulfate  325 mg Oral Q48H Aurelio Kelly MD      Fluticasone Furoate-Vilanterol  1 puff Inhalation Daily Aurelio Kelly MD      HYDROmorphone  0.2 mg Intravenous Q4H PRN Edenilson Kramer, DO      hyoscyamine  0.125 mg Oral Q6H PRN Aurelio Kelly MD      latanoprost  1 drop Both Eyes HS Aurelio Kelly MD      levothyroxine  25 mcg Oral Early Morning Aurelio Kelly MD      lidocaine  1 patch Topical Daily Aurelio Kelly MD      lisinopril  10 mg Oral Daily Aurelio Kelly MD      melatonin  3 mg Oral HS Edenilson Kramer, DO      metoprolol succinate  100 mg Oral Daily Aurelio Kelly MD      ondansetron  4 mg Intravenous Q6H PRN Aurelio Kelly MD      oxyCODONE  5 mg Oral Q4H PRN Edenilson Kramer DO      oxyCODONE  2.5 mg Oral Q4H PRN Edenilson Kramer,        pseudoephedrine  60 mg Oral Q6H PRN Aurelio Kelly MD          Today, Patient Was Seen By: Edenilson Kramer DO    **Please Note: This note may have been constructed using a voice recognition system.**

## 2023-12-16 NOTE — ASSESSMENT & PLAN NOTE
Streptococcal bacteremia, unclear etiology.  Possible right upper extremity cellulitis as source.  TTE (limited) negative for endocarditis  Discontinue vancomycin and started on Ancef  ID following, appreciate commendations  Follow-up repeat blood cultures obtained on 12/16

## 2023-12-16 NOTE — PROGRESS NOTES
Lucero Booth is a 88 y.o. female who is currently ordered Vancomycin IV with management by the Pharmacy Consult service.  Relevant clinical data and objective / subjective history reviewed.  Vancomycin Assessment:  Indication and Goal AUC/Trough: Urinary tract infection (goal -600, trough >10); Soft tissue (goal -600, trough >10), -600, trough >10  Clinical Status: stable  Micro: pending   Renal Function:  SCr: 0.97 mg/dL  CrCl: 42 mL/min  Renal replacement: Not on dialysis  Days of Therapy: 3  Current Dose: 750 mg IV Q12  Vancomycin Plan:  New Dosin mg IV Q24  Estimated AUC: 455 mcg*hr/mL  Estimated Trough: 13.6 mcg/mL  Next Level: Random on  AM labs  Renal Function Monitoring: Daily BMP and UOP  Pharmacy will continue to follow closely for s/sx of nephrotoxicity, infusion reactions and appropriateness of therapy.  BMP and CBC will be ordered per protocol. We will continue to follow the patient’s culture results and clinical progress daily.    Florence Urrutia, Pharmacist

## 2023-12-17 PROBLEM — I48.91 ATRIAL FIBRILLATION (HCC): Status: ACTIVE | Noted: 2023-12-17

## 2023-12-17 PROBLEM — L03.113 CELLULITIS OF RIGHT UPPER EXTREMITY: Status: ACTIVE | Noted: 2023-12-15

## 2023-12-17 PROBLEM — K75.81 NASH (NONALCOHOLIC STEATOHEPATITIS): Status: ACTIVE | Noted: 2023-12-17

## 2023-12-17 PROBLEM — D64.9 ANEMIA: Status: ACTIVE | Noted: 2023-12-17

## 2023-12-17 LAB
ANION GAP SERPL CALCULATED.3IONS-SCNC: 5 MMOL/L
ANION GAP SERPL CALCULATED.3IONS-SCNC: 5 MMOL/L
BACTERIA BLD CULT: ABNORMAL
BUN SERPL-MCNC: 35 MG/DL (ref 5–25)
BUN SERPL-MCNC: 35 MG/DL (ref 5–25)
CALCIUM SERPL-MCNC: 7.2 MG/DL (ref 8.4–10.2)
CALCIUM SERPL-MCNC: 7.2 MG/DL (ref 8.4–10.2)
CHLORIDE SERPL-SCNC: 103 MMOL/L (ref 96–108)
CHLORIDE SERPL-SCNC: 103 MMOL/L (ref 96–108)
CO2 SERPL-SCNC: 24 MMOL/L (ref 21–32)
CO2 SERPL-SCNC: 24 MMOL/L (ref 21–32)
CREAT SERPL-MCNC: 0.93 MG/DL (ref 0.6–1.3)
CREAT SERPL-MCNC: 0.93 MG/DL (ref 0.6–1.3)
ERYTHROCYTE [DISTWIDTH] IN BLOOD BY AUTOMATED COUNT: 17.1 % (ref 11.6–15.1)
ERYTHROCYTE [DISTWIDTH] IN BLOOD BY AUTOMATED COUNT: 17.1 % (ref 11.6–15.1)
GFR SERPL CREATININE-BSD FRML MDRD: 55 ML/MIN/1.73SQ M
GFR SERPL CREATININE-BSD FRML MDRD: 55 ML/MIN/1.73SQ M
GLUCOSE SERPL-MCNC: 90 MG/DL (ref 65–140)
GLUCOSE SERPL-MCNC: 90 MG/DL (ref 65–140)
GRAM STN SPEC: ABNORMAL
HCT VFR BLD AUTO: 28.7 % (ref 34.8–46.1)
HCT VFR BLD AUTO: 28.7 % (ref 34.8–46.1)
HGB BLD-MCNC: 8.9 G/DL (ref 11.5–15.4)
HGB BLD-MCNC: 8.9 G/DL (ref 11.5–15.4)
MAGNESIUM SERPL-MCNC: 1.7 MG/DL (ref 1.9–2.7)
MAGNESIUM SERPL-MCNC: 1.7 MG/DL (ref 1.9–2.7)
MCH RBC QN AUTO: 27.4 PG (ref 26.8–34.3)
MCH RBC QN AUTO: 27.4 PG (ref 26.8–34.3)
MCHC RBC AUTO-ENTMCNC: 31 G/DL (ref 31.4–37.4)
MCHC RBC AUTO-ENTMCNC: 31 G/DL (ref 31.4–37.4)
MCV RBC AUTO: 88 FL (ref 82–98)
MCV RBC AUTO: 88 FL (ref 82–98)
NRBC BLD AUTO-RTO: 0 /100 WBCS
NRBC BLD AUTO-RTO: 0 /100 WBCS
PLATELET # BLD AUTO: 153 THOUSANDS/UL (ref 149–390)
PLATELET # BLD AUTO: 153 THOUSANDS/UL (ref 149–390)
PMV BLD AUTO: 11.5 FL (ref 8.9–12.7)
PMV BLD AUTO: 11.5 FL (ref 8.9–12.7)
POTASSIUM SERPL-SCNC: 4.1 MMOL/L (ref 3.5–5.3)
POTASSIUM SERPL-SCNC: 4.1 MMOL/L (ref 3.5–5.3)
RBC # BLD AUTO: 3.25 MILLION/UL (ref 3.81–5.12)
RBC # BLD AUTO: 3.25 MILLION/UL (ref 3.81–5.12)
SODIUM SERPL-SCNC: 132 MMOL/L (ref 135–147)
SODIUM SERPL-SCNC: 132 MMOL/L (ref 135–147)
STREPTOCOCCUS DNA BLD POS NAA+NON-PROBE: DETECTED
STREPTOCOCCUS DNA BLD POS NAA+NON-PROBE: DETECTED
WBC # BLD AUTO: 21.42 THOUSAND/UL (ref 4.31–10.16)
WBC # BLD AUTO: 21.42 THOUSAND/UL (ref 4.31–10.16)

## 2023-12-17 PROCEDURE — 80048 BASIC METABOLIC PNL TOTAL CA: CPT | Performed by: STUDENT IN AN ORGANIZED HEALTH CARE EDUCATION/TRAINING PROGRAM

## 2023-12-17 PROCEDURE — 85027 COMPLETE CBC AUTOMATED: CPT | Performed by: STUDENT IN AN ORGANIZED HEALTH CARE EDUCATION/TRAINING PROGRAM

## 2023-12-17 PROCEDURE — 99233 SBSQ HOSP IP/OBS HIGH 50: CPT | Performed by: INTERNAL MEDICINE

## 2023-12-17 PROCEDURE — 99232 SBSQ HOSP IP/OBS MODERATE 35: CPT | Performed by: STUDENT IN AN ORGANIZED HEALTH CARE EDUCATION/TRAINING PROGRAM

## 2023-12-17 PROCEDURE — 83735 ASSAY OF MAGNESIUM: CPT | Performed by: STUDENT IN AN ORGANIZED HEALTH CARE EDUCATION/TRAINING PROGRAM

## 2023-12-17 RX ORDER — FUROSEMIDE 10 MG/ML
20 INJECTION INTRAMUSCULAR; INTRAVENOUS ONCE
Status: COMPLETED | OUTPATIENT
Start: 2023-12-17 | End: 2023-12-17

## 2023-12-17 RX ORDER — MAGNESIUM SULFATE 1 G/100ML
1 INJECTION INTRAVENOUS ONCE
Status: COMPLETED | OUTPATIENT
Start: 2023-12-17 | End: 2023-12-17

## 2023-12-17 RX ADMIN — AMLODIPINE BESYLATE 5 MG: 5 TABLET ORAL at 09:12

## 2023-12-17 RX ADMIN — APIXABAN 5 MG: 5 TABLET, FILM COATED ORAL at 09:13

## 2023-12-17 RX ADMIN — OXYCODONE HYDROCHLORIDE 5 MG: 5 TABLET ORAL at 20:37

## 2023-12-17 RX ADMIN — Medication 3 MG: at 21:57

## 2023-12-17 RX ADMIN — OXYCODONE HYDROCHLORIDE 5 MG: 5 TABLET ORAL at 10:54

## 2023-12-17 RX ADMIN — FLUTICASONE FUROATE AND VILANTEROL TRIFENATATE 1 PUFF: 100; 25 POWDER RESPIRATORY (INHALATION) at 09:13

## 2023-12-17 RX ADMIN — Medication 2 G: at 09:14

## 2023-12-17 RX ADMIN — DULOXETINE HYDROCHLORIDE 30 MG: 30 CAPSULE, DELAYED RELEASE ORAL at 09:12

## 2023-12-17 RX ADMIN — CEFAZOLIN SODIUM 2000 MG: 2 SOLUTION INTRAVENOUS at 05:16

## 2023-12-17 RX ADMIN — ACETAMINOPHEN 975 MG: 325 TABLET, FILM COATED ORAL at 13:54

## 2023-12-17 RX ADMIN — LIDOCAINE 5% 1 PATCH: 700 PATCH TOPICAL at 09:09

## 2023-12-17 RX ADMIN — Medication 1 APPLICATION: at 09:14

## 2023-12-17 RX ADMIN — CEFAZOLIN SODIUM 2000 MG: 2 SOLUTION INTRAVENOUS at 13:18

## 2023-12-17 RX ADMIN — Medication 2 G: at 18:24

## 2023-12-17 RX ADMIN — CEFAZOLIN SODIUM 2000 MG: 2 SOLUTION INTRAVENOUS at 20:11

## 2023-12-17 RX ADMIN — APIXABAN 5 MG: 5 TABLET, FILM COATED ORAL at 18:18

## 2023-12-17 RX ADMIN — Medication 2 G: at 12:44

## 2023-12-17 RX ADMIN — ACETAMINOPHEN 975 MG: 325 TABLET, FILM COATED ORAL at 21:58

## 2023-12-17 RX ADMIN — LISINOPRIL 10 MG: 10 TABLET ORAL at 09:12

## 2023-12-17 RX ADMIN — METOPROLOL SUCCINATE 100 MG: 100 TABLET, EXTENDED RELEASE ORAL at 09:12

## 2023-12-17 RX ADMIN — OXYCODONE HYDROCHLORIDE 5 MG: 5 TABLET ORAL at 05:32

## 2023-12-17 RX ADMIN — Medication 2 G: at 21:57

## 2023-12-17 RX ADMIN — LATANOPROST 1 DROP: 50 SOLUTION/ DROPS OPHTHALMIC at 21:57

## 2023-12-17 RX ADMIN — OXYCODONE HYDROCHLORIDE 5 MG: 5 TABLET ORAL at 16:03

## 2023-12-17 RX ADMIN — ACETAMINOPHEN 975 MG: 325 TABLET, FILM COATED ORAL at 05:20

## 2023-12-17 RX ADMIN — FUROSEMIDE 20 MG: 10 INJECTION, SOLUTION INTRAMUSCULAR; INTRAVENOUS at 10:48

## 2023-12-17 RX ADMIN — HYDROMORPHONE HYDROCHLORIDE 0.2 MG: 0.2 INJECTION, SOLUTION INTRAMUSCULAR; INTRAVENOUS; SUBCUTANEOUS at 09:09

## 2023-12-17 RX ADMIN — MAGNESIUM SULFATE HEPTAHYDRATE 1 G: 1 INJECTION, SOLUTION INTRAVENOUS at 10:49

## 2023-12-17 RX ADMIN — ATORVASTATIN CALCIUM 10 MG: 10 TABLET, FILM COATED ORAL at 09:12

## 2023-12-17 RX ADMIN — AMIODARONE HYDROCHLORIDE 200 MG: 200 TABLET ORAL at 09:13

## 2023-12-17 RX ADMIN — OXYCODONE HYDROCHLORIDE 5 MG: 5 TABLET ORAL at 00:57

## 2023-12-17 RX ADMIN — LEVOTHYROXINE SODIUM 25 MCG: 25 TABLET ORAL at 05:20

## 2023-12-17 NOTE — ASSESSMENT & PLAN NOTE
Presented with hemoglobin of 10.6, hemoglobin 8.9 today  Monitor closely  No signs of bleeding thus far

## 2023-12-17 NOTE — PLAN OF CARE
Problem: Prexisting or High Potential for Compromised Skin Integrity  Goal: Skin integrity is maintained or improved  Description: INTERVENTIONS:  - Identify patients at risk for skin breakdown  - Assess and monitor skin integrity  - Assess and monitor nutrition and hydration status  - Monitor labs   - Assess for incontinence   - Turn and reposition patient  - Assist with mobility/ambulation  - Relieve pressure over bony prominences  - Avoid friction and shearing  - Provide appropriate hygiene as needed including keeping skin clean and dry  - Evaluate need for skin moisturizer/barrier cream  - Collaborate with interdisciplinary team   - Patient/family teaching  - Consider wound care consult   Outcome: Progressing     Problem: PAIN - ADULT  Goal: Verbalizes/displays adequate comfort level or baseline comfort level  Description: Interventions:  - Encourage patient to monitor pain and request assistance  - Assess pain using appropriate pain scale  - Administer analgesics based on type and severity of pain and evaluate response  - Implement non-pharmacological measures as appropriate and evaluate response  - Consider cultural and social influences on pain and pain management  - Notify physician/advanced practitioner if interventions unsuccessful or patient reports new pain  Outcome: Progressing     Problem: INFECTION - ADULT  Goal: Absence or prevention of progression during hospitalization  Description: INTERVENTIONS:  - Assess and monitor for signs and symptoms of infection  - Monitor lab/diagnostic results  - Monitor all insertion sites, i.e. indwelling lines, tubes, and drains  - Monitor endotracheal if appropriate and nasal secretions for changes in amount and color  - Sparks appropriate cooling/warming therapies per order  - Administer medications as ordered  - Instruct and encourage patient and family to use good hand hygiene technique  - Identify and instruct in appropriate isolation precautions for  identified infection/condition  Outcome: Progressing     Problem: SAFETY ADULT  Goal: Patient will remain free of falls  Description: INTERVENTIONS:  - Educate patient/family on patient safety including physical limitations  - Instruct patient to call for assistance with activity   - Consult OT/PT to assist with strengthening/mobility   - Keep Call bell within reach  - Keep bed low and locked with side rails adjusted as appropriate  - Keep care items and personal belongings within reach  - Initiate and maintain comfort rounds  - Make Fall Risk Sign visible to staff  - Offer Toileting every 2 Hours, in advance of need  - Apply yellow socks and bracelet for high fall risk patients  - Consider moving patient to room near nurses station  Outcome: Progressing  Goal: Maintain or return to baseline ADL function  Description: INTERVENTIONS:  -  Assess patient's ability to carry out ADLs; assess patient's baseline for ADL function and identify physical deficits which impact ability to perform ADLs (bathing, care of mouth/teeth, toileting, grooming, dressing, etc.)  - Assess/evaluate cause of self-care deficits   - Assess range of motion  - Assess patient's mobility; develop plan if impaired  - Assess patient's need for assistive devices and provide as appropriate  - Encourage maximum independence but intervene and supervise when necessary  - Involve family in performance of ADLs  - Assess for home care needs following discharge   - Consider OT consult to assist with ADL evaluation and planning for discharge  - Provide patient education as appropriate  Outcome: Progressing  Goal: Maintains/Returns to pre admission functional level  Description: INTERVENTIONS:  - Perform AM-PAC 6 Click Basic Mobility/ Daily Activity assessment daily.  - Set and communicate daily mobility goal to care team and patient/family/caregiver.   - Collaborate with rehabilitation services on mobility goals if consulted  - Reposition patient every 2  hours.  - Out of bed for toileting  - Record patient progress and toleration of activity level   Outcome: Progressing     Problem: DISCHARGE PLANNING  Goal: Discharge to home or other facility with appropriate resources  Description: INTERVENTIONS:  - Identify barriers to discharge w/patient and caregiver  - Arrange for needed discharge resources and transportation as appropriate  - Identify discharge learning needs (meds, wound care, etc.)  - Arrange for interpretive services to assist at discharge as needed  - Refer to Case Management Department for coordinating discharge planning if the patient needs post-hospital services based on physician/advanced practitioner order or complex needs related to functional status, cognitive ability, or social support system  Outcome: Progressing     Problem: Knowledge Deficit  Goal: Patient/family/caregiver demonstrates understanding of disease process, treatment plan, medications, and discharge instructions  Description: Complete learning assessment and assess knowledge base.  Interventions:  - Provide teaching at level of understanding  - Provide teaching via preferred learning methods  Outcome: Progressing     Problem: RESPIRATORY - ADULT  Goal: Achieves optimal ventilation and oxygenation  Description: INTERVENTIONS:  - Assess for changes in respiratory status  - Assess for changes in mentation and behavior  - Position to facilitate oxygenation and minimize respiratory effort  - Oxygen administered by appropriate delivery if ordered  - Initiate smoking cessation education as indicated  - Encourage broncho-pulmonary hygiene including cough, deep breathe, Incentive Spirometry  - Assess the need for suctioning and aspirate as needed  - Assess and instruct to report SOB or any respiratory difficulty  - Respiratory Therapy support as indicated  Outcome: Progressing     Problem: SKIN/TISSUE INTEGRITY - ADULT  Goal: Skin Integrity remains intact(Skin Breakdown  Prevention)  Description: Assess:  -Inspect skin when repositioning, toileting, and assisting with ADLS  -Assess extremities for adequate circulation and sensation     Bed Management:  -Have minimal linens on bed & keep smooth, unwrinkled  -Change linens as needed when moist or perspiring    Activity:  -Encourage activity and walks on unit  -Encourage or provide ROM exercises   -Turn and reposition patient every 2 Hours  -Use appropriate equipment to lift or move patient in bed    Skin Care:  -Avoid use of baby powder, tape, friction and shearing, hot water or constrictive clothing  -Relieve pressure over bony prominences using foams   -Do not massage red bony areas  Outcome: Progressing  Goal: Incision(s), wounds(s) or drain site(s) healing without S/S of infection  Description: INTERVENTIONS  - Assess and document dressing, incision, wound bed, drain sites and surrounding tissue  - Provide patient and family education  Outcome: Progressing  Goal: Pressure injury heals and does not worsen  Description: Interventions:  - Implement low air loss mattress or specialty surface (Criteria met)  - Apply silicone foam dressing  - Apply fecal or urinary incontinence containment device   - Turn and reposition patient & offload bony prominences every 2 hours   - Utilize friction reducing device or surface for transfers   - Consider nutrition services referral as needed  Outcome: Progressing     Problem: Nutrition/Hydration-ADULT  Goal: Nutrient/Hydration intake appropriate for improving, restoring or maintaining nutritional needs  Description: Monitor and assess patient's nutrition/hydration status for malnutrition. Collaborate with interdisciplinary team and initiate plan and interventions as ordered.  Monitor patient's weight and dietary intake as ordered or per policy. Utilize nutrition screening tool and intervene as necessary. Determine patient's food preferences and provide high-protein, high-caloric foods as  appropriate.     INTERVENTIONS:  - Monitor oral intake, urinary output, labs, and treatment plans  - Assess nutrition and hydration status and recommend course of action  - Evaluate amount of meals eaten  - Assist patient with eating if necessary   - Allow adequate time for meals  - Recommend/ encourage appropriate diets, oral nutritional supplements, and vitamin/mineral supplements  - Order, calculate, and assess calorie counts as needed  - Recommend, monitor, and adjust tube feedings and TPN/PPN based on assessed needs  - Assess need for intravenous fluids  - Provide specific nutrition/hydration education as appropriate  - Include patient/family/caregiver in decisions related to nutrition  Outcome: Progressing

## 2023-12-17 NOTE — ASSESSMENT & PLAN NOTE
Presented with right facial droop and slurred speech  CT head without any acute abnormality  CTA head and neck showed no large vessel occlusion or acute thrombus but it was a difficult study  Neurology consulted for suspected metabolic encephalopathy in the setting of sepsis  Stroke pathway discontinued  MRI deferred  Mentation now closer to baseline per daughter at bedside.

## 2023-12-17 NOTE — ASSESSMENT & PLAN NOTE
Streptococcal bacteremia, possible right upper extremity cellulitis as source.  TTE (limited) negative for endocarditis  CT CAP unremarkable for any source of infection  Continue Ancef 2 g every 8 hours  ID following, appreciate recommendations  Repeat blood cultures negative at 24 hours

## 2023-12-17 NOTE — PROGRESS NOTES
[facility]  Progress Note  Name: Lucero Booth I  MRN: [id number]  Unit/Bed#: [facility] 713-01 I Date of Admission: 12/14/2023   Date of Service: 12/17/2023 I Hospital Day: 3    Assessment/Plan   * Sepsis (HCC)  Assessment & Plan  Evidenced by tachypnea and leukocytosis  Blood cultures with beta-hemolytic Streptococcus group G  Possible source of right upper extremity cellulitis  UA/flu/RSV/COVID unremarkable  CT CAP no other acute findings of infection aside from likely chronic osteomyelitis underlying sacral decubitus ulcer  Continue high-dose Ancef  ID following, appreciate recommendations    Acute metabolic encephalopathy  Assessment & Plan  Initially presenting with strokelike symptoms with right-sided facial droop and generalized weakness with slurred speech.    CT head and CTA head and neck unremarkable for any acute stroke  MRI deferred by neurology  Neurology suspects metabolic encephalopathy in setting of sepsis  Holding home Haldol and Xanax at this time given encephalopathy  See additional management per sepsis A/P  12/17: Mentation closer to baseline now per daughter at bedside.  Monitor closely.      Anemia  Assessment & Plan  Presented with hemoglobin of 10.6, hemoglobin 8.9 today  Monitor closely  No signs of bleeding thus far    Acute respiratory failure (HCC)  Assessment & Plan  Patient was required 2L NC overnight and has escalated to 6 L nasal on 12/15  S/p IV Lasix x 2 days  Additional IV Lasix 20 mg x 1 today  On 4 L nasal cannula this morning  Likely secondary to bilateral pleural effusion noted on CT scan  Received IV fluids on admission for sepsis  Continue holding IVF    Cellulitis of right upper extremity  Assessment & Plan  Right upper extremity with erythema in the antecubital region  Cellulitis versus possible allergy from cefepime administration yesterday given patient's history of cephalosporin  Continue Ancef  Monitor  closely    Bacteremia  Assessment & Plan  Streptococcal bacteremia, possible right upper extremity cellulitis as source.  TTE (limited) negative for endocarditis  CT CAP unremarkable for any source of infection  Continue Ancef 2 g every 8 hours  ID following, appreciate recommendations  Repeat blood cultures negative at 24 hours    Acquired hypothyroidism  Assessment & Plan  Continue levothyroxine 25 mcg.    Ambulatory dysfunction  Assessment & Plan  Patient is unable to walk however is able to at least to pivot and stand from bed.  Physical and Occupational Therapy consult, case management.    Stage IV decubitus ulcer (HCC)  Assessment & Plan  Wound care management  Surgery consulted, no acute intervention recommended  CT showing signs of underlying osteomyelitis though chronicity is uncertain    Stroke-like symptoms  Assessment & Plan  Presented with right facial droop and slurred speech  CT head without any acute abnormality  CTA head and neck showed no large vessel occlusion or acute thrombus but it was a difficult study  Neurology consulted for suspected metabolic encephalopathy in the setting of sepsis  Stroke pathway discontinued  MRI deferred  Mentation now closer to baseline per daughter at bedside.           VTE Pharmacologic Prophylaxis:   Moderate Risk (Score 3-4) - Pharmacological DVT Prophylaxis Ordered: apixaban (Eliquis).    Mobility:   Basic Mobility Inpatient Raw Score: 6  JH-HLM Goal: 2: Bed activities/Dependent transfer  JH-HLM Achieved: 2: Bed activities/Dependent transfer  HLM Goal achieved. Continue to encourage appropriate mobility.    Patient Centered Rounds: I performed bedside rounds with nursing staff today.   Discussions with Specialists or Other Care Team Provider: ID    Education and Discussions with Family / Patient: Updated  (daughter) at bedside.    Total Time Spent on Date of Encounter in care of patient: 25 mins. This time was spent on one or more of the following:  performing physical exam; counseling and coordination of care; obtaining or reviewing history; documenting in the medical record; reviewing/ordering tests, medications or procedures; communicating with other healthcare professionals and discussing with patient's family/caregivers.    Current Length of Stay: 3 day(s)  Current Patient Status: Inpatient   Certification Statement: The patient will continue to require additional inpatient hospital stay due to IV antibiotics  Discharge Plan: Anticipate discharge in 48-72 hrs to prior assisted or independent living facility.    Code Status: Level 2 - DNAR: but accepts endotracheal intubation    Subjective:   Reports low back pain. No other complaints.     Objective:     Vitals:   Temp (24hrs), Av.6 °F (36.4 °C), Min:97.3 °F (36.3 °C), Max:98.1 °F (36.7 °C)    Temp:  [97.3 °F (36.3 °C)-98.1 °F (36.7 °C)] 98.1 °F (36.7 °C)  HR:  [61-68] 65  Resp:  [17-18] 17  BP: (114-134)/(51-59) 126/56  SpO2:  [92 %-98 %] 92 %  Body mass index is 39.68 kg/m².     Input and Output Summary (last 24 hours):     Intake/Output Summary (Last 24 hours) at 2023 1533  Last data filed at 2023 0600  Gross per 24 hour   Intake 370 ml   Output 650 ml   Net -280 ml       Physical Exam:   Physical Exam  Vitals reviewed.   Constitutional:       General: She is not in acute distress.     Appearance: Normal appearance. She is not ill-appearing.   HENT:      Head: Normocephalic and atraumatic.   Cardiovascular:      Rate and Rhythm: Normal rate and regular rhythm.   Pulmonary:      Effort: Pulmonary effort is normal. No respiratory distress.      Comments: 4L  Abdominal:      General: Bowel sounds are normal.      Tenderness: There is no abdominal tenderness.   Musculoskeletal:      Right lower leg: Edema present.      Left lower leg: Edema present.   Neurological:      General: No focal deficit present.      Mental Status: She is alert. Mental status is at baseline.   Psychiatric:          Behavior: Behavior normal.          Additional Data:     Labs:  Results from last 7 days   Lab Units 12/17/23  0542 12/16/23  0638   WBC Thousand/uL 21.42* 26.91*   HEMOGLOBIN g/dL 8.9* 9.6*   HEMATOCRIT % 28.7* 29.7*   PLATELETS Thousands/uL 153 161   BANDS PCT %  --  16*   LYMPHO PCT %  --  2*   MONO PCT %  --  4   EOS PCT %  --  0     Results from last 7 days   Lab Units 12/17/23  0542 12/16/23  0638   SODIUM mmol/L 132* 136   POTASSIUM mmol/L 4.1 3.8   CHLORIDE mmol/L 103 105   CO2 mmol/L 24 23   BUN mg/dL 35* 36*   CREATININE mg/dL 0.93 0.97   ANION GAP mmol/L 5 8   CALCIUM mg/dL 7.2* 7.6*   ALBUMIN g/dL  --  1.9*   TOTAL BILIRUBIN mg/dL  --  0.90   ALK PHOS U/L  --  135*   ALT U/L  --  13   AST U/L  --  42*   GLUCOSE RANDOM mg/dL 90 102     Results from last 7 days   Lab Units 12/15/23  0447   INR  2.09*     Results from last 7 days   Lab Units 12/14/23  1708   POC GLUCOSE mg/dl 182*         Results from last 7 days   Lab Units 12/14/23 2016   LACTIC ACID mmol/L 1.6       Lines/Drains:  Invasive Devices       Peripheral Intravenous Line  Duration             Peripheral IV 12/14/23 Dorsal (posterior);Left Forearm 2 days              Drain  Duration             External Urinary Catheter <1 day                          Imaging: No pertinent imaging reviewed.    Recent Cultures (last 7 days):   Results from last 7 days   Lab Units 12/16/23  0638 12/14/23 2016   BLOOD CULTURE  No Growth at 24 hrs.  No Growth at 24 hrs. Beta Hemolytic Streptococcus Group G*  Beta Hemolytic Streptococcus Group G*   GRAM STAIN RESULT   --  Gram positive cocci in pairs and chains*  Gram positive cocci in pairs and chains*       Last 24 Hours Medication List:   Current Facility-Administered Medications   Medication Dose Route Frequency Provider Last Rate    acetaminophen  975 mg Oral Q8H REBECCA Kramer DO      aluminum-magnesium hydroxide-simethicone  30 mL Oral Q6H PRN Aurelio Kelly MD      amiodarone  200 mg Oral  Daily Aurelio Kelly MD      amLODIPine  5 mg Oral Daily Aurelio Kelly MD      And    atorvastatin  10 mg Oral Daily Aurelio Kelly MD      ammonium lactate  1 Application Topical Daily Aurelio Kelly MD      apixaban  5 mg Oral BID Edenilson Kramer,       cefazolin  2,000 mg Intravenous Q8H Darcy Rushing MD 2,000 mg (12/17/23 1318)    Diclofenac Sodium  2 g Topical 4x Daily Aurelio Kelly MD      docusate sodium  100 mg Oral BID PRN Aurelio Kelly MD      DULoxetine  30 mg Oral Daily Edenilson Kramer DO      ferrous sulfate  325 mg Oral Q48H Aurelio Kelly MD      Fluticasone Furoate-Vilanterol  1 puff Inhalation Daily Aurelio Kelly MD      HYDROmorphone  0.2 mg Intravenous Q4H PRN Edenilson Kramer,       hyoscyamine  0.125 mg Oral Q6H PRN Aurelio Kelly MD      latanoprost  1 drop Both Eyes HS Aurelio Kelly MD      levothyroxine  25 mcg Oral Early Morning Aurelio Kelly MD      lidocaine  1 patch Topical Daily Aurelio Kelly MD      lisinopril  10 mg Oral Daily Aurelio Kelly MD      melatonin  3 mg Oral HS Edenilson Kramer DO      metoprolol succinate  100 mg Oral Daily Aurelio Kelly MD      ondansetron  4 mg Intravenous Q6H PRN Aurelio Kelly MD      oxyCODONE  5 mg Oral Q4H PRN Edenilson Kramer DO      oxyCODONE  2.5 mg Oral Q4H PRN Edenilson Kramer,       pseudoephedrine  60 mg Oral Q6H PRN Aurelio Kelly MD          Today, Patient Was Seen By: Edenilson Kramer DO    **Please Note: This note may have been constructed using a voice recognition system.**

## 2023-12-17 NOTE — ASSESSMENT & PLAN NOTE
Patient was required 2L NC overnight and has escalated to 6 L nasal on 12/15  S/p IV Lasix x 2 days  Additional IV Lasix 20 mg x 1 today  On 4 L nasal cannula this morning  Likely secondary to bilateral pleural effusion noted on CT scan  Received IV fluids on admission for sepsis  Continue holding IVF

## 2023-12-17 NOTE — ASSESSMENT & PLAN NOTE
Right upper extremity with erythema in the antecubital region  Cellulitis versus possible allergy from cefepime administration yesterday given patient's history of cephalosporin  Continue Ancef  Monitor closely

## 2023-12-17 NOTE — PROGRESS NOTES
Progress Note - Infectious Disease   Lucero Booth 88 y.o. female MRN: [id number]  Unit/Bed#: [facility] 713-01 Encounter: [id number]      Impression/Recommendations:  Sepsis, POA.    WBC, RR.  Due to bacteremia.  No other appreciable source.  UA, flu/RSV/COVID PCR, CT C/A/P otherwise negative.  Improving.  Rec:  Continue antibiotics as below  Follow temperatures closely  Recheck WBC in AM to monitor infection  Supportive care as per the primary service     Group G Strep bacteremia.    Consider due to RUE cellulitis versus other skin source given multiple wounds as below.  Doubt relation to recent pseudoaneurysm given normal exam and imaging.  No indwelling intravascular devices.  Repeat blood cultures negative.  TTE (technically difficult) negative.  Rec:  Continue cefazolin for now  Follow up repeat blood cultures  Anticipate hopeful transition to PO antibiotics once clinically improved     RUE cellulitis.    Unclear etiology.  Unusual location and distribution.  Improving.  Rec:  Continue antibiotics as above  Serial exams     Left upper arm wound.  Possibly pressure wound due to overall appearance.  No apparent superinfection.  Rec:  LWC per nursing     Chronic sacral decubitus ulcer.  No apparent acute superinfection on photo review.  Consider chronic osteomyelitis given CT findings.  Rec:  LWC per nursing     Acute encephalopathy.  Suspect toxic metabolic due to sepsis as above.  CT head, CTA negative.  Improving.  Rec:  Continue antibiotics as above  Follow mental status closely     Recent L SFA pseudoaneurysm.  Thought iatrogenic from recent Micra pacer placement at OSH.  Status post thrombin injection 11/3/23 with resolution on repeat Doppler.  No noted abnormality on CT A/P this admission although non-contrast and exam benign.     Cirrhosis.  Thought due to ABRAHAM.  T. Bili slightly elevated which may be due to sepsis as above, now improved.  CT A/P unremarkable.     Status post spinal fusion.  Chronic back pain  which patient reports is stable.  Rec:  Consider MRI if back pain worsens     Cephalosporin allergy.  Unknown reaction.  Received cefepime in ED without issue.  Tolerating cefazolin  Rec:  Trial of cefazolin as above     Antibiotics:  Cefazolin #2  Antibiotics #2    Subjective/24 Hour Events:  Patient seen on AM rounds.  Feels lousy.  Complains of right back pain.    Objective:  Vitals:  Temp:  [97.3 °F (36.3 °C)-97.8 °F (36.6 °C)] 97.8 °F (36.6 °C)  HR:  [61-68] 61  Resp:  [17-18] 17  BP: (114-134)/(51-63) 134/59  SpO2:  [94 %-98 %] 97 %  Temp (24hrs), Av.4 °F (36.3 °C), Min:97.3 °F (36.3 °C), Max:97.8 °F (36.6 °C)  Current: Temperature: 97.8 °F (36.6 °C)    Physical Exam:   General:  No acute distress  Psychiatric:  Awake and alert  Pulmonary:  Normal respiratory excursion without accessory muscle use  Abdomen:  Soft, nontender  Extremities:  Improving erythema right forearm  Skin:  No rashes    Lab Results:  I have personally reviewed pertinent labs.  Results from last 7 days   Lab Units 12/17/23  0542 12/16/23  0638 12/15/23  0447   POTASSIUM mmol/L 4.1 3.8 3.4*   CHLORIDE mmol/L 103 105 100   CO2 mmol/L 24 23 21   BUN mg/dL 35* 36* 34*   CREATININE mg/dL 0.93 0.97 0.96   EGFR ml/min/1.73sq m 55 53 53   CALCIUM mg/dL 7.2* 7.6* 7.7*   AST U/L  --  42* 28   ALT U/L  --  13 12   ALK PHOS U/L  --  135* 127*     Results from last 7 days   Lab Units 23  0542 12/16/23  0638 12/15/23  0447   WBC Thousand/uL 21.42* 26.91* 30.89*   HEMOGLOBIN g/dL 8.9* 9.6* 10.2*   PLATELETS Thousands/uL 153 161 154     Results from last 7 days   Lab Units 2338 12/15/23  0456 23   BLOOD CULTURE  No Growth at 24 hrs.  No Growth at 24 hrs.  --  Beta Hemolytic Streptococcus Group G*  Beta Hemolytic Streptococcus Group G*   GRAM STAIN RESULT   --   --  Gram positive cocci in pairs and chains*  Gram positive cocci in pairs and chains*   MRSA CULTURE ONLY   --  Methicillin Resistant Staphylococcus aureus  isolated*  Please note: This patient requires contact precautions.  --        Imaging Studies:   I have personally reviewed pertinent imaging study reports and images in PACS.    EKG, Pathology, and Other Studies:   I have personally reviewed pertinent reports.

## 2023-12-17 NOTE — PLAN OF CARE
Problem: Prexisting or High Potential for Compromised Skin Integrity  Goal: Skin integrity is maintained or improved  Description: INTERVENTIONS:  - Identify patients at risk for skin breakdown  - Assess and monitor skin integrity  - Assess and monitor nutrition and hydration status  - Monitor labs   - Assess for incontinence   - Turn and reposition patient  - Assist with mobility/ambulation  - Relieve pressure over bony prominences  - Avoid friction and shearing  - Provide appropriate hygiene as needed including keeping skin clean and dry  - Evaluate need for skin moisturizer/barrier cream  - Collaborate with interdisciplinary team   - Patient/family teaching  - Consider wound care consult   Outcome: Progressing     Problem: PAIN - ADULT  Goal: Verbalizes/displays adequate comfort level or baseline comfort level  Description: Interventions:  - Encourage patient to monitor pain and request assistance  - Assess pain using appropriate pain scale  - Administer analgesics based on type and severity of pain and evaluate response  - Implement non-pharmacological measures as appropriate and evaluate response  - Consider cultural and social influences on pain and pain management  - Notify physician/advanced practitioner if interventions unsuccessful or patient reports new pain  Outcome: Progressing     Problem: INFECTION - ADULT  Goal: Absence or prevention of progression during hospitalization  Description: INTERVENTIONS:  - Assess and monitor for signs and symptoms of infection  - Monitor lab/diagnostic results  - Monitor all insertion sites, i.e. indwelling lines, tubes, and drains  - Monitor endotracheal if appropriate and nasal secretions for changes in amount and color  - [place] appropriate cooling/warming therapies per order  - Administer medications as ordered  - Instruct and encourage patient and family to use good hand hygiene technique  - Identify and instruct in appropriate isolation precautions for  identified infection/condition  Outcome: Progressing  Goal: Absence of fever/infection during neutropenic period  Description: INTERVENTIONS:  - Monitor WBC    Outcome: Progressing     Problem: SAFETY ADULT  Goal: Patient will remain free of falls  Description: INTERVENTIONS:  - Educate patient/family on patient safety including physical limitations  - Instruct patient to call for assistance with activity   - Consult OT/PT to assist with strengthening/mobility   - Keep Call bell within reach  - Keep bed low and locked with side rails adjusted as appropriate  - Keep care items and personal belongings within reach  - Initiate and maintain comfort rounds  - Make Fall Risk Sign visible to staff  - Offer Toileting every 2 Hours, in advance of need  - Initiate/Maintain bed alarm  - Apply yellow socks and bracelet for high fall risk patients  - Consider moving patient to room near nurses station  Outcome: Progressing  Goal: Maintain or return to baseline ADL function  Description: INTERVENTIONS:  -  Assess patient's ability to carry out ADLs; assess patient's baseline for ADL function and identify physical deficits which impact ability to perform ADLs (bathing, care of mouth/teeth, toileting, grooming, dressing, etc.)  - Assess/evaluate cause of self-care deficits   - Assess range of motion  - Assess patient's mobility; develop plan if impaired  - Assess patient's need for assistive devices and provide as appropriate  - Encourage maximum independence but intervene and supervise when necessary  - Involve family in performance of ADLs  - Assess for home care needs following discharge   - Consider OT consult to assist with ADL evaluation and planning for discharge  - Provide patient education as appropriate  Outcome: Progressing  Goal: Maintains/Returns to pre admission functional level  Description: INTERVENTIONS:  - Perform AM-PAC 6 Click Basic Mobility/ Daily Activity assessment daily.  - Set and communicate daily mobility  goal to care team and patient/family/caregiver.   - Collaborate with rehabilitation services on mobility goals if consulted  - Perform Range of Motion 2 times a day.  - Reposition patient every 2 hours.  - Dangle patient 2 times a day  - Stand patient 2 times a day  - Ambulate patient 2 times a day  - Out of bed to chair 2 times a day   - Out of bed for meals 2 times a day  - Out of bed for toileting  - Record patient progress and toleration of activity level   Outcome: Progressing     Problem: DISCHARGE PLANNING  Goal: Discharge to home or other facility with appropriate resources  Description: INTERVENTIONS:  - Identify barriers to discharge w/patient and caregiver  - Arrange for needed discharge resources and transportation as appropriate  - Identify discharge learning needs (meds, wound care, etc.)  - Arrange for interpretive services to assist at discharge as needed  - Refer to Case Management Department for coordinating discharge planning if the patient needs post-hospital services based on physician/advanced practitioner order or complex needs related to functional status, cognitive ability, or social support system  Outcome: Progressing     Problem: Knowledge Deficit  Goal: Patient/family/caregiver demonstrates understanding of disease process, treatment plan, medications, and discharge instructions  Description: Complete learning assessment and assess knowledge base.  Interventions:  - Provide teaching at level of understanding  - Provide teaching via preferred learning methods  Outcome: Progressing     Problem: NEUROSENSORY - ADULT  Goal: Achieves stable or improved neurological status  Description: INTERVENTIONS  - Monitor and report changes in neurological status  - Monitor vital signs such as temperature, blood pressure, glucose, and any other labs ordered   - Initiate measures to prevent increased intracranial pressure  - Monitor for seizure activity and implement precautions if appropriate       Outcome: Progressing  Goal: Remains free of injury related to seizures activity  Description: INTERVENTIONS  - Maintain airway, patient safety  and administer oxygen as ordered  - Monitor patient for seizure activity, document and report duration and description of seizure to physician/advanced practitioner  - If seizure occurs,  ensure patient safety during seizure  - Reorient patient post seizure  - Seizure pads on all 4 side rails  - Instruct patient/family to notify RN of any seizure activity including if an aura is experienced  - Instruct patient/family to call for assistance with activity based on nursing assessment  - Administer anti-seizure medications if ordered    Outcome: Progressing  Goal: Achieves maximal functionality and self care  Description: INTERVENTIONS  - Monitor swallowing and airway patency with patient fatigue and changes in neurological status  - Encourage and assist patient to increase activity and self care.   - Encourage visually impaired, hearing impaired and aphasic patients to use assistive/communication devices  Outcome: Progressing     Problem: CARDIOVASCULAR - ADULT  Goal: Maintains optimal cardiac output and hemodynamic stability  Description: INTERVENTIONS:  - Monitor I/O, vital signs and rhythm  - Monitor for S/S and trends of decreased cardiac output  - Administer and titrate ordered vasoactive medications to optimize hemodynamic stability  - Assess quality of pulses, skin color and temperature  - Assess for signs of decreased coronary artery perfusion  - Instruct patient to report change in severity of symptoms  Outcome: Progressing  Goal: Absence of cardiac dysrhythmias or at baseline rhythm  Description: INTERVENTIONS:  - Continuous cardiac monitoring, vital signs, obtain 12 lead EKG if ordered  - Administer antiarrhythmic and heart rate control medications as ordered  - Monitor electrolytes and administer replacement therapy as ordered  Outcome: Progressing     Problem:  RESPIRATORY - ADULT  Goal: Achieves optimal ventilation and oxygenation  Description: INTERVENTIONS:  - Assess for changes in respiratory status  - Assess for changes in mentation and behavior  - Position to facilitate oxygenation and minimize respiratory effort  - Oxygen administered by appropriate delivery if ordered  - Initiate smoking cessation education as indicated  - Encourage broncho-pulmonary hygiene including cough, deep breathe, Incentive Spirometry  - Assess the need for suctioning and aspirate as needed  - Assess and instruct to report SOB or any respiratory difficulty  - Respiratory Therapy support as indicated  Outcome: Progressing     Problem: MUSCULOSKELETAL - ADULT  Goal: Maintain or return mobility to safest level of function  Description: INTERVENTIONS:  - Assess patient's ability to carry out ADLs; assess patient's baseline for ADL function and identify physical deficits which impact ability to perform ADLs (bathing, care of mouth/teeth, toileting, grooming, dressing, etc.)  - Assess/evaluate cause of self-care deficits   - Assess range of motion  - Assess patient's mobility  - Assess patient's need for assistive devices and provide as appropriate  - Encourage maximum independence but intervene and supervise when necessary  - Involve family in performance of ADLs  - Assess for home care needs following discharge   - Consider OT consult to assist with ADL evaluation and planning for discharge  - Provide patient education as appropriate  Outcome: Progressing  Goal: Maintain proper alignment of affected body part  Description: INTERVENTIONS:  - Support, maintain and protect limb and body alignment  - Provide patient/ family with appropriate education  Outcome: Progressing     Problem: Nutrition/Hydration-ADULT  Goal: Nutrient/Hydration intake appropriate for improving, restoring or maintaining nutritional needs  Description: Monitor and assess patient's nutrition/hydration status for malnutrition.  Collaborate with interdisciplinary team and initiate plan and interventions as ordered.  Monitor patient's weight and dietary intake as ordered or per policy. Utilize nutrition screening tool and intervene as necessary. Determine patient's food preferences and provide high-protein, high-caloric foods as appropriate.     INTERVENTIONS:  - Monitor oral intake, urinary output, labs, and treatment plans  - Assess nutrition and hydration status and recommend course of action  - Evaluate amount of meals eaten  - Assist patient with eating if necessary   - Allow adequate time for meals  - Recommend/ encourage appropriate diets, oral nutritional supplements, and vitamin/mineral supplements  - Order, calculate, and assess calorie counts as needed  - Recommend, monitor, and adjust tube feedings and TPN/PPN based on assessed needs  - Assess need for intravenous fluids  - Provide specific nutrition/hydration education as appropriate  - Include patient/family/caregiver in decisions related to nutrition  Outcome: Progressing

## 2023-12-17 NOTE — ASSESSMENT & PLAN NOTE
Evidenced by tachypnea and leukocytosis  Blood cultures with beta-hemolytic Streptococcus group G  Possible source of right upper extremity cellulitis  UA/flu/RSV/COVID unremarkable  CT CAP no other acute findings of infection aside from likely chronic osteomyelitis underlying sacral decubitus ulcer  Continue high-dose Ancef  ID following, appreciate recommendations

## 2023-12-17 NOTE — ASSESSMENT & PLAN NOTE
Initially presenting with strokelike symptoms with right-sided facial droop and generalized weakness with slurred speech.    CT head and CTA head and neck unremarkable for any acute stroke  MRI deferred by neurology  Neurology suspects metabolic encephalopathy in setting of sepsis  Holding home Haldol and Xanax at this time given encephalopathy  See additional management per sepsis A/P  12/17: Mentation closer to baseline now per daughter at bedside.  Monitor closely.

## 2023-12-17 NOTE — ASSESSMENT & PLAN NOTE
Imaging suggestive of cirrhosis likely ABRAHAM per GI  Liver enzymes normal  Outpatient follow-up with GI recommended

## 2023-12-18 ENCOUNTER — TRANSCRIBE ORDERS (OUTPATIENT)
Dept: GASTROENTEROLOGY | Facility: CLINIC | Age: 88
End: 2023-12-18

## 2023-12-18 LAB
ANION GAP SERPL CALCULATED.3IONS-SCNC: 6 MMOL/L
ANION GAP SERPL CALCULATED.3IONS-SCNC: 6 MMOL/L
BUN SERPL-MCNC: 34 MG/DL (ref 5–25)
BUN SERPL-MCNC: 34 MG/DL (ref 5–25)
CALCIUM SERPL-MCNC: 7.3 MG/DL (ref 8.4–10.2)
CALCIUM SERPL-MCNC: 7.3 MG/DL (ref 8.4–10.2)
CHLORIDE SERPL-SCNC: 102 MMOL/L (ref 96–108)
CHLORIDE SERPL-SCNC: 102 MMOL/L (ref 96–108)
CO2 SERPL-SCNC: 24 MMOL/L (ref 21–32)
CO2 SERPL-SCNC: 24 MMOL/L (ref 21–32)
CREAT SERPL-MCNC: 0.8 MG/DL (ref 0.6–1.3)
CREAT SERPL-MCNC: 0.8 MG/DL (ref 0.6–1.3)
ERYTHROCYTE [DISTWIDTH] IN BLOOD BY AUTOMATED COUNT: 17.3 % (ref 11.6–15.1)
ERYTHROCYTE [DISTWIDTH] IN BLOOD BY AUTOMATED COUNT: 17.3 % (ref 11.6–15.1)
GFR SERPL CREATININE-BSD FRML MDRD: 66 ML/MIN/1.73SQ M
GFR SERPL CREATININE-BSD FRML MDRD: 66 ML/MIN/1.73SQ M
GLUCOSE SERPL-MCNC: 97 MG/DL (ref 65–140)
GLUCOSE SERPL-MCNC: 97 MG/DL (ref 65–140)
HCT VFR BLD AUTO: 29.1 % (ref 34.8–46.1)
HCT VFR BLD AUTO: 29.1 % (ref 34.8–46.1)
HGB BLD-MCNC: 9.5 G/DL (ref 11.5–15.4)
HGB BLD-MCNC: 9.5 G/DL (ref 11.5–15.4)
MAGNESIUM SERPL-MCNC: 1.8 MG/DL (ref 1.9–2.7)
MAGNESIUM SERPL-MCNC: 1.8 MG/DL (ref 1.9–2.7)
MCH RBC QN AUTO: 27.8 PG (ref 26.8–34.3)
MCH RBC QN AUTO: 27.8 PG (ref 26.8–34.3)
MCHC RBC AUTO-ENTMCNC: 32.6 G/DL (ref 31.4–37.4)
MCHC RBC AUTO-ENTMCNC: 32.6 G/DL (ref 31.4–37.4)
MCV RBC AUTO: 85 FL (ref 82–98)
MCV RBC AUTO: 85 FL (ref 82–98)
PHOSPHATE SERPL-MCNC: 4.1 MG/DL (ref 2.3–4.1)
PHOSPHATE SERPL-MCNC: 4.1 MG/DL (ref 2.3–4.1)
PLATELET # BLD AUTO: 182 THOUSANDS/UL (ref 149–390)
PLATELET # BLD AUTO: 182 THOUSANDS/UL (ref 149–390)
PMV BLD AUTO: 11.3 FL (ref 8.9–12.7)
PMV BLD AUTO: 11.3 FL (ref 8.9–12.7)
POTASSIUM SERPL-SCNC: 3.9 MMOL/L (ref 3.5–5.3)
POTASSIUM SERPL-SCNC: 3.9 MMOL/L (ref 3.5–5.3)
RBC # BLD AUTO: 3.42 MILLION/UL (ref 3.81–5.12)
RBC # BLD AUTO: 3.42 MILLION/UL (ref 3.81–5.12)
SODIUM SERPL-SCNC: 132 MMOL/L (ref 135–147)
SODIUM SERPL-SCNC: 132 MMOL/L (ref 135–147)
WBC # BLD AUTO: 17.87 THOUSAND/UL (ref 4.31–10.16)
WBC # BLD AUTO: 17.87 THOUSAND/UL (ref 4.31–10.16)

## 2023-12-18 PROCEDURE — 83735 ASSAY OF MAGNESIUM: CPT | Performed by: STUDENT IN AN ORGANIZED HEALTH CARE EDUCATION/TRAINING PROGRAM

## 2023-12-18 PROCEDURE — 80048 BASIC METABOLIC PNL TOTAL CA: CPT | Performed by: STUDENT IN AN ORGANIZED HEALTH CARE EDUCATION/TRAINING PROGRAM

## 2023-12-18 PROCEDURE — 97112 NEUROMUSCULAR REEDUCATION: CPT

## 2023-12-18 PROCEDURE — 99233 SBSQ HOSP IP/OBS HIGH 50: CPT | Performed by: INTERNAL MEDICINE

## 2023-12-18 PROCEDURE — 85027 COMPLETE CBC AUTOMATED: CPT | Performed by: STUDENT IN AN ORGANIZED HEALTH CARE EDUCATION/TRAINING PROGRAM

## 2023-12-18 PROCEDURE — 99232 SBSQ HOSP IP/OBS MODERATE 35: CPT | Performed by: STUDENT IN AN ORGANIZED HEALTH CARE EDUCATION/TRAINING PROGRAM

## 2023-12-18 PROCEDURE — 97530 THERAPEUTIC ACTIVITIES: CPT

## 2023-12-18 PROCEDURE — 84100 ASSAY OF PHOSPHORUS: CPT | Performed by: STUDENT IN AN ORGANIZED HEALTH CARE EDUCATION/TRAINING PROGRAM

## 2023-12-18 RX ORDER — CEPHALEXIN 500 MG/1
500 CAPSULE ORAL EVERY 6 HOURS SCHEDULED
Status: DISCONTINUED | OUTPATIENT
Start: 2023-12-18 | End: 2023-12-21 | Stop reason: HOSPADM

## 2023-12-18 RX ORDER — HYDROMORPHONE HCL IN WATER/PF 6 MG/30 ML
0.2 PATIENT CONTROLLED ANALGESIA SYRINGE INTRAVENOUS
Status: DISCONTINUED | OUTPATIENT
Start: 2023-12-18 | End: 2023-12-21 | Stop reason: HOSPADM

## 2023-12-18 RX ORDER — OXYCODONE HYDROCHLORIDE 10 MG/1
10 TABLET ORAL EVERY 6 HOURS PRN
Status: DISCONTINUED | OUTPATIENT
Start: 2023-12-18 | End: 2023-12-21 | Stop reason: HOSPADM

## 2023-12-18 RX ORDER — FUROSEMIDE 10 MG/ML
20 INJECTION INTRAMUSCULAR; INTRAVENOUS ONCE
Status: COMPLETED | OUTPATIENT
Start: 2023-12-18 | End: 2023-12-18

## 2023-12-18 RX ORDER — BISACODYL 10 MG
10 SUPPOSITORY, RECTAL RECTAL DAILY PRN
Status: DISCONTINUED | OUTPATIENT
Start: 2023-12-18 | End: 2023-12-21 | Stop reason: HOSPADM

## 2023-12-18 RX ORDER — OXYCODONE HYDROCHLORIDE 5 MG/1
5 TABLET ORAL ONCE
Status: COMPLETED | OUTPATIENT
Start: 2023-12-18 | End: 2023-12-18

## 2023-12-18 RX ORDER — OXYCODONE HYDROCHLORIDE 5 MG/1
5 TABLET ORAL EVERY 6 HOURS PRN
Status: DISCONTINUED | OUTPATIENT
Start: 2023-12-18 | End: 2023-12-21 | Stop reason: HOSPADM

## 2023-12-18 RX ORDER — POLYETHYLENE GLYCOL 3350 17 G/17G
17 POWDER, FOR SOLUTION ORAL DAILY PRN
Status: DISCONTINUED | OUTPATIENT
Start: 2023-12-18 | End: 2023-12-21 | Stop reason: HOSPADM

## 2023-12-18 RX ORDER — AMOXICILLIN 250 MG
1 CAPSULE ORAL
Status: DISCONTINUED | OUTPATIENT
Start: 2023-12-18 | End: 2023-12-21 | Stop reason: HOSPADM

## 2023-12-18 RX ORDER — MAGNESIUM SULFATE 1 G/100ML
1 INJECTION INTRAVENOUS ONCE
Qty: 100 ML | Refills: 0 | Status: COMPLETED | OUTPATIENT
Start: 2023-12-18 | End: 2023-12-18

## 2023-12-18 RX ADMIN — ATORVASTATIN CALCIUM 10 MG: 10 TABLET, FILM COATED ORAL at 08:54

## 2023-12-18 RX ADMIN — MAGNESIUM SULFATE HEPTAHYDRATE 1 G: 1 INJECTION, SOLUTION INTRAVENOUS at 12:40

## 2023-12-18 RX ADMIN — LATANOPROST 1 DROP: 50 SOLUTION/ DROPS OPHTHALMIC at 22:08

## 2023-12-18 RX ADMIN — Medication 2 G: at 12:04

## 2023-12-18 RX ADMIN — FERROUS SULFATE TAB 325 MG (65 MG ELEMENTAL FE) 325 MG: 325 (65 FE) TAB at 22:05

## 2023-12-18 RX ADMIN — CEPHALEXIN 500 MG: 500 CAPSULE ORAL at 17:18

## 2023-12-18 RX ADMIN — APIXABAN 5 MG: 5 TABLET, FILM COATED ORAL at 17:18

## 2023-12-18 RX ADMIN — OXYCODONE HYDROCHLORIDE 5 MG: 5 TABLET ORAL at 08:58

## 2023-12-18 RX ADMIN — APIXABAN 5 MG: 5 TABLET, FILM COATED ORAL at 08:54

## 2023-12-18 RX ADMIN — LEVOTHYROXINE SODIUM 25 MCG: 25 TABLET ORAL at 05:19

## 2023-12-18 RX ADMIN — SENNOSIDES, DOCUSATE SODIUM 1 TABLET: 8.6; 5 TABLET ORAL at 22:04

## 2023-12-18 RX ADMIN — Medication 1 APPLICATION: at 09:05

## 2023-12-18 RX ADMIN — HYDROMORPHONE HYDROCHLORIDE 0.2 MG: 0.2 INJECTION, SOLUTION INTRAMUSCULAR; INTRAVENOUS; SUBCUTANEOUS at 12:03

## 2023-12-18 RX ADMIN — ACETAMINOPHEN 975 MG: 325 TABLET, FILM COATED ORAL at 22:05

## 2023-12-18 RX ADMIN — AMIODARONE HYDROCHLORIDE 200 MG: 200 TABLET ORAL at 08:54

## 2023-12-18 RX ADMIN — DULOXETINE HYDROCHLORIDE 30 MG: 30 CAPSULE, DELAYED RELEASE ORAL at 08:54

## 2023-12-18 RX ADMIN — LISINOPRIL 10 MG: 10 TABLET ORAL at 08:54

## 2023-12-18 RX ADMIN — AMLODIPINE BESYLATE 5 MG: 5 TABLET ORAL at 08:54

## 2023-12-18 RX ADMIN — FLUTICASONE FUROATE AND VILANTEROL TRIFENATATE 1 PUFF: 100; 25 POWDER RESPIRATORY (INHALATION) at 09:07

## 2023-12-18 RX ADMIN — CEFAZOLIN SODIUM 2000 MG: 2 SOLUTION INTRAVENOUS at 12:03

## 2023-12-18 RX ADMIN — FUROSEMIDE 20 MG: 10 INJECTION, SOLUTION INTRAMUSCULAR; INTRAVENOUS at 14:27

## 2023-12-18 RX ADMIN — ACETAMINOPHEN 975 MG: 325 TABLET, FILM COATED ORAL at 13:25

## 2023-12-18 RX ADMIN — CEFAZOLIN SODIUM 2000 MG: 2 SOLUTION INTRAVENOUS at 05:19

## 2023-12-18 RX ADMIN — METOPROLOL SUCCINATE 100 MG: 100 TABLET, EXTENDED RELEASE ORAL at 08:54

## 2023-12-18 RX ADMIN — HYDROMORPHONE HYDROCHLORIDE 0.2 MG: 0.2 INJECTION, SOLUTION INTRAMUSCULAR; INTRAVENOUS; SUBCUTANEOUS at 17:21

## 2023-12-18 RX ADMIN — Medication 2 G: at 09:07

## 2023-12-18 RX ADMIN — Medication 3 MG: at 22:05

## 2023-12-18 RX ADMIN — DOCUSATE SODIUM 100 MG: 100 CAPSULE, LIQUID FILLED ORAL at 12:39

## 2023-12-18 RX ADMIN — ACETAMINOPHEN 975 MG: 325 TABLET, FILM COATED ORAL at 05:19

## 2023-12-18 RX ADMIN — Medication 2 G: at 17:18

## 2023-12-18 RX ADMIN — OXYCODONE HYDROCHLORIDE 5 MG: 5 TABLET ORAL at 14:27

## 2023-12-18 RX ADMIN — Medication 2 G: at 22:08

## 2023-12-18 RX ADMIN — LIDOCAINE 5% 1 PATCH: 700 PATCH TOPICAL at 08:54

## 2023-12-18 RX ADMIN — BISACODYL 10 MG: 10 SUPPOSITORY RECTAL at 13:25

## 2023-12-18 RX ADMIN — OXYCODONE HYDROCHLORIDE 5 MG: 5 TABLET ORAL at 14:00

## 2023-12-18 NOTE — PHYSICAL THERAPY NOTE
Physical Therapy Progress Note     12/18/23 1401   PT Last Visit   PT Visit Date 12/18/23   Note Type   Note Type Treatment   Pain Assessment   Pain Assessment Tool 0-10   Pain Score 10 - Worst Possible Pain   Pain Location/Orientation Orientation: Lower;Location: Back   Hospital Pain Intervention(s) Repositioned;Emotional support   Restrictions/Precautions   Other Precautions Cognitive;Bed Alarm;Fall Risk;Pain   Subjective   Subjective The patient notes continued pain.   Bed Mobility   Rolling R 2  Maximal assistance   Additional items Assist x 1;Increased time required;Verbal cues;LE management   Rolling L 2  Maximal assistance   Additional items Assist x 1;Increased time required;Verbal cues;LE management   Supine to Sit 2  Maximal assistance   Additional items Assist x 2;HOB elevated;Increased time required;Verbal cues;LE management   Sit to Supine 2  Maximal assistance   Additional items Assist x 2;Increased time required;Verbal cues;LE management   Transfers   Other 2  Maximal assistance   Additional items Assist x 2;Increased time required;Verbal cues   Additional Comments Lateral scoots along the edge of the bed.   Balance   Static Sitting Poor +   Dynamic Sitting Poor   Activity Tolerance   Activity Tolerance Patient limited by fatigue;Patient limited by pain   Nurse Made Aware Yes.   Exercises   Knee AROM Long Arc Quad Sitting;Bilateral;AAROM;PROM;10 reps  (x2 sets.)   Assessment   Prognosis Poor   Problem List Decreased strength;Decreased endurance;Impaired balance;Decreased mobility;Decreased coordination;Decreased cognition;Impaired judgement;Decreased safety awareness;Orthopedic restrictions;Pain   Assessment The patient continues to remain limited due to pain as well as fatigue. She requires significant assistance for all mobility as well as frequent redirection to task. She completed therapeutic exercises with much assistance and instruction. She was rolled to be cleaned twice during the session with  fresh linens applied.   Barriers to Discharge Inaccessible home environment;Decreased caregiver support   Goals   Patient Goals To have less pain.   STG Expiration Date 12/29/23   PT Treatment Day 1   Plan   Treatment/Interventions LE strengthening/ROM;Therapeutic exercise;Endurance training;Patient/family training;Bed mobility   Progress Slow progress, decreased activity tolerance   PT Frequency 1-2x/wk   Discharge Recommendation   Rehab Resource Intensity Level, PT II (Moderate Resource Intensity)   AM-PAC Basic Mobility Inpatient   Turning in Flat Bed Without Bedrails 1   Lying on Back to Sitting on Edge of Flat Bed Without Bedrails 1   Moving Bed to Chair 1   Standing Up From Chair Using Arms 1   Walk in Room 1   Climb 3-5 Stairs With Railing 1   Basic Mobility Inpatient Raw Score 6   Turning Head Towards Sound 3   Follow Simple Instructions 2   Low Function Basic Mobility Raw Score  11   Low Function Basic Mobility Standardized Score  16.55   Highest Level Of Mobility   JH-HLM Goal 2: Bed activities/Dependent transfer   JH-HLM Achieved 3: Sit at edge of bed         An AM-PAC Basic Mobility raw score less than 16 suggests the patient may benefit from discharge to post-acute rehab services.    Alvaro Lynn, PTA

## 2023-12-18 NOTE — ASSESSMENT & PLAN NOTE
Patient is on multiple medications including pain medications, sedatives, antidepressants.    Patient's daughter is in the room and has been advised regarding discontinuation of these medications so that we can fully assess improvement of her mental status.  Resume as able

## 2023-12-18 NOTE — ASSESSMENT & PLAN NOTE
Evidenced by tachypnea and leukocytosis  Blood cultures with beta-hemolytic Streptococcus group G  Possible source of right upper extremity cellulitis  UA/flu/RSV/COVID unremarkable  CT CAP no other acute findings of infection aside from likely chronic osteomyelitis underlying sacral decubitus ulcer  Transition to oral keflex  ID following, appreciate recommendations

## 2023-12-18 NOTE — RESTORATIVE TECHNICIAN NOTE
Restorative Technician Note      Patient Name: Lucero Booth     Note Type: Mobility  Patient Position Upon Consult: Supine  Activity Performed: Repositioned  Patient Position at End of Consult: Supine; All needs within reach; Bed/Chair alarm activated  Joyce WALSH, Restorative Technician,

## 2023-12-18 NOTE — PROGRESS NOTES
Progress Note - Infectious Disease   Lucero Booth 88 y.o. female MRN: [id number]  Unit/Bed#: [facility] 734-01 Encounter: [id number]      Impression/Recommendations:  Sepsis, POA.    WBC, RR.  Due to bacteremia.  No other appreciable source.  UA, flu/RSV/COVID PCR, CT C/A/P otherwise negative.  Improving.  Rec:  Continue antibiotics as below  Follow temperatures closely  Recheck WBC in AM to monitor infection  Supportive care as per the primary service     Group G Strep bacteremia.    Consider due to RUE cellulitis versus other skin source given multiple wounds as below.  Doubt relation to recent pseudoaneurysm given normal exam and imaging.  No indwelling intravascular devices.  Repeat blood cultures 12/16 negative.  TTE (technically difficult) negative.  Rec:  Change antibiotics to Keflex with plan to continue through 12/25 to complete 10 days total antibiotics     RUE cellulitis.    Unclear etiology.  Unusual location and distribution.  Improving.  Rec:  Continue antibiotics as above  Serial exams     Left upper arm wound.  Possibly pressure wound due to overall appearance.  No apparent superinfection.  Rec:  LWC per nursing     Chronic sacral decubitus ulcer.  No apparent acute superinfection on photo review.  Consider chronic osteomyelitis given CT findings.  Rec:  LWC per nursing     Acute encephalopathy.  Suspect toxic metabolic due to sepsis as above.  CT head, CTA negative.  Improving.  Rec:  Continue antibiotics as above  Follow mental status closely     Recent L SFA pseudoaneurysm.  Thought iatrogenic from recent Micra pacer placement at OSH.  Status post thrombin injection 11/3/23 with resolution on repeat Doppler.  No noted abnormality on CT A/P this admission although non-contrast and exam benign.     Cirrhosis.  Thought due to ABRAHAM.  T. Bili slightly elevated which may be due to sepsis as above, now improved.  CT A/P unremarkable.     Status post spinal fusion.  Chronic back pain which patient reports is  stable.     Cephalosporin allergy.  Unknown reaction.  Received cefepime in ED without issue.  Tolerating cefazolin.     I discussed with Dr. Kramer the above plan to change to PO antibiotics and consider D/C backto SNF soon.  He agrees with the plan.    Antibiotics:  Cefazolin #3  Antibiotics #3    Subjective/24 Hour Events:  Patient seen on AM rounds.  Chronic back pain.    Objective:  Vitals:  Temp:  [97.2 °F (36.2 °C)-98.1 °F (36.7 °C)] 97.2 °F (36.2 °C)  HR:  [61-65] 62  Resp:  [12-20] 17  BP: (125-137)/(55-56) 137/56  SpO2:  [92 %-97 %] 97 %  Temp (24hrs), Av.5 °F (36.4 °C), Min:97.2 °F (36.2 °C), Max:98.1 °F (36.7 °C)  Current: Temperature: (!) 97.2 °F (36.2 °C)    Physical Exam:   General:  No acute distress  Psychiatric:  Awake and alert  Pulmonary:  Normal respiratory excursion without accessory muscle use  Abdomen:  Soft, nontender  Extremities:  Improving erythema right forearm  Skin:  No rashes    Lab Results:  I have personally reviewed pertinent labs.  Results from last 7 days   Lab Units 2341 2342 2338 12/15/23  0447   POTASSIUM mmol/L 3.9 4.1 3.8 3.4*   CHLORIDE mmol/L 102 103 105 100   CO2 mmol/L 24 24 23 21   BUN mg/dL 34* 35* 36* 34*   CREATININE mg/dL 0.80 0.93 0.97 0.96   EGFR ml/min/1.73sq m 66 55 53 53   CALCIUM mg/dL 7.3* 7.2* 7.6* 7.7*   AST U/L  --   --  42* 28   ALT U/L  --   --  13 12   ALK PHOS U/L  --   --  135* 127*     Results from last 7 days   Lab Units 23  0541 23  0542 23  0638   WBC Thousand/uL 17.87* 21.42* 26.91*   HEMOGLOBIN g/dL 9.5* 8.9* 9.6*   PLATELETS Thousands/uL 182 153 161     Results from last 7 days   Lab Units 23  0638 12/15/23  0456 23  2016   BLOOD CULTURE  No Growth at 48 hrs.  No Growth at 48 hrs.  --  Beta Hemolytic Streptococcus Group G*  Beta Hemolytic Streptococcus Group G*   GRAM STAIN RESULT   --   --  Gram positive cocci in pairs and chains*  Gram positive cocci in pairs and chains*   MRSA  CULTURE ONLY   --  Methicillin Resistant Staphylococcus aureus isolated*  Please note: This patient requires contact precautions.  --        Imaging Studies:   I have personally reviewed pertinent imaging study reports and images in PACS.    EKG, Pathology, and Other Studies:   I have personally reviewed pertinent reports.

## 2023-12-18 NOTE — PLAN OF CARE
Problem: Prexisting or High Potential for Compromised Skin Integrity  Goal: Skin integrity is maintained or improved  Description: INTERVENTIONS:  - Identify patients at risk for skin breakdown  - Assess and monitor skin integrity  - Assess and monitor nutrition and hydration status  - Monitor labs   - Assess for incontinence   - Turn and reposition patient  - Assist with mobility/ambulation  - Relieve pressure over bony prominences  - Avoid friction and shearing  - Provide appropriate hygiene as needed including keeping skin clean and dry  - Evaluate need for skin moisturizer/barrier cream  - Collaborate with interdisciplinary team   - Patient/family teaching  - Consider wound care consult   Outcome: Progressing     Problem: PAIN - ADULT  Goal: Verbalizes/displays adequate comfort level or baseline comfort level  Description: Interventions:  - Encourage patient to monitor pain and request assistance  - Assess pain using appropriate pain scale  - Administer analgesics based on type and severity of pain and evaluate response  - Implement non-pharmacological measures as appropriate and evaluate response  - Notify physician/advanced practitioner if interventions unsuccessful or patient reports new pain  Outcome: Not Progressing     Problem: INFECTION - ADULT  Goal: Absence or prevention of progression during hospitalization  Description: INTERVENTIONS:  - Assess and monitor for signs and symptoms of infection  - Monitor lab/diagnostic results  - Monitor all insertion sites, i.e. indwelling lines, tubes, and drains  - [place] appropriate cooling/warming therapies per order  - Administer medications as ordered  - Instruct and encourage patient and family to use good hand hygiene technique  - Identify and instruct in appropriate isolation precautions for identified infection/condition  Outcome: Progressing     Problem: SAFETY ADULT  Goal: Patient will remain free of falls  Description: INTERVENTIONS:  - Educate  patient/family on patient safety including physical limitations  - Instruct patient to call for assistance with activity   - Consult OT/PT to assist with strengthening/mobility   - Keep Call bell within reach  - Keep bed low and locked with side rails adjusted as appropriate  - Keep care items and personal belongings within reach  - Initiate and maintain comfort rounds  - Make Fall Risk Sign visible to staff  - Offer Toileting every 2 Hours, in advance of need  - Initiate/Maintain bed alarm  - Obtain necessary fall risk management equipment.  - Apply yellow socks and bracelet for high fall risk patients  - Consider moving patient to room near nurses station  Outcome: Progressing  Goal: Maintain or return to baseline ADL function  Description: INTERVENTIONS:  -  Assess patient's ability to carry out ADLs; assess patient's baseline for ADL function and identify physical deficits which impact ability to perform ADLs (bathing, care of mouth/teeth, toileting, grooming, dressing, etc.)  - Assess/evaluate cause of self-care deficits   - Assess range of motion  - Assess patient's mobility; develop plan if impaired  - Assess patient's need for assistive devices and provide as appropriate  - Encourage maximum independence but intervene and supervise when necessary  - Involve family in performance of ADLs  - Assess for home care needs following discharge   - Consider OT consult to assist with ADL evaluation and planning for discharge  - Provide patient education as appropriate  Outcome: Progressing  Goal: Maintains/Returns to pre admission functional level  Description: INTERVENTIONS:  - Perform AM-PAC 6 Click Basic Mobility/ Daily Activity assessment daily.  - Set and communicate daily mobility goal to care team and patient/family/caregiver.   - Collaborate with rehabilitation services on mobility goals if consulted  - Perform Range of Motion 3 times a day.  - Reposition patient every 2 hours.  - Record patient progress and  toleration of activity level   Outcome: Progressing     Problem: DISCHARGE PLANNING  Goal: Discharge to home or other facility with appropriate resources  Description: INTERVENTIONS:  - Identify barriers to discharge w/patient and caregiver  - Arrange for needed discharge resources and transportation as appropriate  - Identify discharge learning needs (meds, wound care, etc.)  - Refer to Case Management Department for coordinating discharge planning if the patient needs post-hospital services based on physician/advanced practitioner order or complex needs related to functional status, cognitive ability, or social support system  Outcome: Progressing     Problem: Knowledge Deficit  Goal: Patient/family/caregiver demonstrates understanding of disease process, treatment plan, medications, and discharge instructions  Description: Complete learning assessment and assess knowledge base.  Interventions:  - Provide teaching at level of understanding  - Provide teaching via preferred learning methods  Outcome: Progressing     Problem: NEUROSENSORY - ADULT  Goal: Achieves stable or improved neurological status  Description: INTERVENTIONS  - Monitor and report changes in neurological status  - Monitor vital signs such as temperature, blood pressure, glucose, and any other labs ordered   - Initiate measures to prevent increased intracranial pressure  - Monitor for seizure activity and implement precautions if appropriate      Outcome: Progressing  Goal: Remains free of injury related to seizures activity  Description: INTERVENTIONS  - Maintain airway, patient safety  and administer oxygen as ordered  - Monitor patient for seizure activity, document and report duration and description of seizure to physician/advanced practitioner  - If seizure occurs,  ensure patient safety during seizure  - Reorient patient post seizure  - Seizure pads on all 4 side rails  - Instruct patient/family to notify RN of any seizure activity including  if an aura is experienced  - Instruct patient/family to call for assistance with activity based on nursing assessment  - Administer anti-seizure medications if ordered    Outcome: Progressing  Goal: Achieves maximal functionality and self care  Description: INTERVENTIONS  - Monitor swallowing and airway patency with patient fatigue and changes in neurological status  - Encourage and assist patient to increase activity and self care.   - Encourage visually impaired, hearing impaired and aphasic patients to use assistive/communication devices  Outcome: Progressing     Problem: CARDIOVASCULAR - ADULT  Goal: Maintains optimal cardiac output and hemodynamic stability  Description: INTERVENTIONS:  - Monitor I/O, vital signs and rhythm  - Monitor for S/S and trends of decreased cardiac output  - Administer and titrate ordered vasoactive medications to optimize hemodynamic stability  - Assess quality of pulses, skin color and temperature  - Assess for signs of decreased coronary artery perfusion  - Instruct patient to report change in severity of symptoms  Outcome: Completed  Goal: Absence of cardiac dysrhythmias or at baseline rhythm  Description: INTERVENTIONS:  - Continuous cardiac monitoring, vital signs, obtain 12 lead EKG if ordered  - Administer antiarrhythmic and heart rate control medications as ordered  - Monitor electrolytes and administer replacement therapy as ordered  Outcome: Completed     Problem: RESPIRATORY - ADULT  Goal: Achieves optimal ventilation and oxygenation  Description: INTERVENTIONS:  - Assess for changes in respiratory status  - Assess for changes in mentation and behavior  - Position to facilitate oxygenation and minimize respiratory effort  - Oxygen administered by appropriate delivery if ordered  - Initiate smoking cessation education as indicated  - Encourage broncho-pulmonary hygiene including cough, deep breathe, Incentive Spirometry  - Assess the need for suctioning and aspirate as  needed  - Assess and instruct to report SOB or any respiratory difficulty  - Respiratory Therapy support as indicated  Outcome: Progressing     Problem: SKIN/TISSUE INTEGRITY - ADULT  Goal: Skin Integrity remains intact(Skin Breakdown Prevention)  Description: Assess:  -Perform Marcellus assessment every shift  -Clean and moisturize skin every 2 hours  -Inspect skin when repositioning, toileting, and assisting with ADLS  -Assess extremities for adequate circulation and sensation     Bed Management:  -Have minimal linens on bed & keep smooth, unwrinkled  -Change linens as needed when moist or perspiring  -Avoid sitting or lying in one position for more than 2 hours while in bed  -Keep HOB at 30 degrees     Toileting:  -Offer bedside commode  -Assess for incontinence every 2 hours  -Use incontinent care products after each incontinent episode such as barrier cream    Activity:  -Encourage or provide ROM exercises   -Turn and reposition patient every 2 Hours  -Use appropriate equipment to lift or move patient in bed    Skin Care:  -Avoid use of baby powder, tape, friction and shearing, hot water or constrictive clothing  -Relieve pressure over bony prominences using allevyn  -Do not massage red bony areas  Outcome: Progressing  Goal: Incision(s), wounds(s) or drain site(s) healing without S/S of infection  Description: INTERVENTIONS  - Assess and document dressing, incision, wound bed, drain sites and surrounding tissue  - Provide patient and family education  - Perform skin care/dressing changes daily  Outcome: Progressing  Goal: Pressure injury heals and does not worsen  Description: Interventions:  - Implement low air loss mattress or specialty surface (Criteria met)  - Apply silicone foam dressing  - Perform passive or active ROM 4 times per day  - Turn and reposition patient & offload bony prominences every 2 hours   - Utilize friction reducing device or surface for transfers   - Consider consults to   interdisciplinary teams such as wound care  - Consider nutrition services referral as needed  Outcome: Progressing     Problem: MUSCULOSKELETAL - ADULT  Goal: Maintain or return mobility to safest level of function  Description: INTERVENTIONS:  - Assess patient's ability to carry out ADLs; assess patient's baseline for ADL function and identify physical deficits which impact ability to perform ADLs (bathing, care of mouth/teeth, toileting, grooming, dressing, etc.)  - Assess/evaluate cause of self-care deficits   - Assess range of motion  - Assess patient's mobility  - Assess patient's need for assistive devices and provide as appropriate  - Encourage maximum independence but intervene and supervise when necessary  - Involve family in performance of ADLs  - Assess for home care needs following discharge   - Consider OT consult to assist with ADL evaluation and planning for discharge  - Provide patient education as appropriate  Outcome: Progressing  Goal: Maintain proper alignment of affected body part  Description: INTERVENTIONS:  - Support, maintain and protect limb and body alignment  - Provide patient/ family with appropriate education  Outcome: Progressing     Problem: Nutrition/Hydration-ADULT  Goal: Nutrient/Hydration intake appropriate for improving, restoring or maintaining nutritional needs  Description: Monitor and assess patient's nutrition/hydration status for malnutrition. Collaborate with interdisciplinary team and initiate plan and interventions as ordered.  Monitor patient's weight and dietary intake as ordered or per policy. Utilize nutrition screening tool and intervene as necessary. Determine patient's food preferences and provide high-protein, high-caloric foods as appropriate.     INTERVENTIONS:  - Monitor oral intake, urinary output, labs, and treatment plans  - Assess nutrition and hydration status and recommend course of action  - Evaluate amount of meals eaten  - Assist patient with eating  if necessary   - Allow adequate time for meals  - Recommend/ encourage appropriate diets, oral nutritional supplements, and vitamin/mineral supplements  - Order, calculate, and assess calorie counts as needed  - Assess need for intravenous fluids  - Provide specific nutrition/hydration education as appropriate  - Include patient/family/caregiver in decisions related to nutrition  Outcome: Progressing

## 2023-12-18 NOTE — ED ATTENDING ATTESTATION
12/14/2023  I, Akash Carlisle MD, saw and evaluated the patient. I have discussed the patient with the resident/non-physician practitioner and agree with the resident's/non-physician practitioner's findings, Plan of Care, and MDM as documented in the resident's/non-physician practitioner's note, except where noted. All available labs and Radiology studies were reviewed.  I was present for key portions of any procedure(s) performed by the resident/non-physician practitioner and I was immediately available to provide assistance.       At this point I agree with the current assessment done in the Emergency Department.  I have conducted an independent evaluation of this patient a history and physical is as follows:    ED Course     Patient is an 88-year-old female brought in by fire rescue for possible stroke alert patient was noted to be acutely altered by family present at facility.  Patient noted to be moaning not following commands for family or EMS.  EMS noted she may have had a facial droop upon initial assessment.  Last known well not known by facility family or EMS staff.    Decision was made to take patient a prehospital stroke alert given facial droop and altered mental status    A prehospital stroke alert was called patient was taken immediately to CT scan for imaging.    Neurology was consulted and saw evaluated patient immediately emergency department per protocol.    I reviewed patient's nursing home records.  Patient appears to be on multiple medications including haloperidol, alprazolam, lorazepam, oxycodone.  Per EMS report a recent medication was started which may have precipitated patient's symptoms is unclear which medication is new at this time.    Patient taken back to examination room status post  CT scan.    Impression: Acute encephalopathy.  Differential diagnosis: CVA, TIA, acute metabolic encephalopathy, polypharmacy, patient at risk for infectious however on initial evaluation do not  suspect sepsis.    Plan to check CT CTA as per stroke protocol.  Troponin, ECG, basic metabolic panel, flu COVID RSV, CBC, coma panel reassess    CT imaging reviewed: Technically suboptimal exam no evidence of large vessel occlusion or acute thrombus.  Small bilateral pleural effusions and bibasilar atelectasis.    Case discussed with neurology patient is not a TNKase candidate given do not have last known well patient is also anticoagulated.    Labs reviewed: Patient noted to be hyperglycemic supratherapeutic INR elevated troponin will trend.  Hyponatremia, elevated BUN flu COVID RSV were negative, patient noted to have a marked leukocytosis as well as anemia.    Do not have obvious source of sepsis at this time awaiting urinalysis we will check blood cultures lactate start patient on empiric antibiotics patient received a fluid bolus in ED however given evidence of pleural effusions will give gentle hydration given concern for possible volume overload    Urinalysis remarkable for bacteria occasional epithelial cells 1-2 white cells 2-4 red cells    Lactic acid less than 2    Given findings patient will be admitted to medicine service for possible infectious etiology as cause of symptoms.    Case discussed with medicine service will admit to medicine.      Critical Care Time  CriticalCare Time    Date/Time: 12/14/2023 7:00 PM    Performed by: Akash Carlisle MD  Authorized by: Akash Carlisle MD    Critical care provider statement:     Critical care time (minutes):  32    Critical care time was exclusive of:  Separately billable procedures and treating other patients and teaching time    Critical care was necessary to treat or prevent imminent or life-threatening deterioration of the following conditions:  CNS failure or compromise and sepsis    Critical care was time spent personally by me on the following activities:  Obtaining history from patient or surrogate, development of treatment plan with patient  or surrogate, evaluation of patient's response to treatment, examination of patient, ordering and performing treatments and interventions, ordering and review of laboratory studies, ordering and review of radiographic studies, discussions with consultants and re-evaluation of patient's condition    I assumed direction of critical care for this patient from another provider in my specialty: no

## 2023-12-18 NOTE — ASSESSMENT & PLAN NOTE
Initially presenting with strokelike symptoms with right-sided facial droop and generalized weakness with slurred speech.    CT head and CTA head and neck unremarkable for any acute stroke  MRI deferred by neurology  Neurology suspects metabolic encephalopathy in setting of sepsis  Holding home Haldol and Xanax at this time given encephalopathy  See additional management per sepsis A/P  12/18: Mentation at baseline

## 2023-12-18 NOTE — ASSESSMENT & PLAN NOTE
Patient was required 2L NC overnight and has escalated to 6 L nasal on 12/15  Was able to be weaned down from 4L to 2L. Aattempted to wean patient off oxygen however she desatted to 85%  S/p IV Lasix x 3 days  Will order additional IV Lasix 20 mg x 1 today  Likely secondary to bilateral pleural effusion noted on CT scan  Received IV fluids on admission for sepsis likely contributing to pleural effusion.

## 2023-12-18 NOTE — ASSESSMENT & PLAN NOTE
Streptococcal bacteremia, possible right upper extremity cellulitis as source.  TTE (limited) negative for endocarditis  CT CAP unremarkable for any source of infection  Transition to Keflex today to complete total 10 days of antibiotics through 12/25  ID following, appreciate recommendations  Repeat blood cultures negative at 48 hours

## 2023-12-18 NOTE — ASSESSMENT & PLAN NOTE
Right upper extremity with erythema in the antecubital region  Cellulitis versus possible allergy from cefepime administration yesterday given patient's history of cephalosporin  Transition to Keflex today to complete total 10 days of antibiotic therapy through 12/25  Monitor closely

## 2023-12-18 NOTE — ASSESSMENT & PLAN NOTE
Presented with hemoglobin in the mid tens, hemoglobin 9.5 today, stable  Monitor closely  No signs of bleeding thus far

## 2023-12-18 NOTE — PLAN OF CARE
Problem: PHYSICAL THERAPY ADULT  Goal: Performs mobility at highest level of function for planned discharge setting.  See evaluation for individualized goals.  Description: Treatment/Interventions: LE strengthening/ROM, Therapeutic exercise, Functional transfer training, Endurance training, Cognitive reorientation, Patient/family training, Equipment eval/education, Bed mobility, Gait training, Spoke to nursing, Spoke to case management, OT  Equipment Recommended:  (TBD)       See flowsheet documentation for full assessment, interventions and recommendations.  Outcome: Progressing  Note: Prognosis: Poor  Problem List: Decreased strength, Decreased endurance, Impaired balance, Decreased mobility, Decreased coordination, Decreased cognition, Impaired judgement, Decreased safety awareness, Orthopedic restrictions, Pain  Assessment: The patient continues to remain limited due to pain as well as fatigue. She requires significant assistance for all mobility as well as frequent redirection to task. She completed therapeutic exercises with much assistance and instruction. She was rolled to be cleaned twice during the session with fresh linens applied.  Barriers to Discharge: Inaccessible home environment, Decreased caregiver support     Rehab Resource Intensity Level, PT: II (Moderate Resource Intensity)    See flowsheet documentation for full assessment.

## 2023-12-18 NOTE — PROGRESS NOTES
[facility]  Progress Note  Name: Lucero Booth I  MRN: [id number]  Unit/Bed#: [facility] 734-01 I Date of Admission: 12/14/2023   Date of Service: 12/18/2023 I Hospital Day: 4    Assessment/Plan   * Sepsis (HCC)  Assessment & Plan  Evidenced by tachypnea and leukocytosis  Blood cultures with beta-hemolytic Streptococcus group G  Possible source of right upper extremity cellulitis  UA/flu/RSV/COVID unremarkable  CT CAP no other acute findings of infection aside from likely chronic osteomyelitis underlying sacral decubitus ulcer  Transition to oral keflex  ID following, appreciate recommendations    Acute metabolic encephalopathy  Assessment & Plan  Initially presenting with strokelike symptoms with right-sided facial droop and generalized weakness with slurred speech.    CT head and CTA head and neck unremarkable for any acute stroke  MRI deferred by neurology  Neurology suspects metabolic encephalopathy in setting of sepsis  Holding home Haldol and Xanax at this time given encephalopathy  See additional management per sepsis A/P  12/18: Mentation at baseline    Atrial fibrillation (HCC)  Assessment & Plan  Rate controlled  Continue Toprol- mg daily  Continue Eliquis    ABRAHAM (nonalcoholic steatohepatitis)  Assessment & Plan  Imaging suggestive of cirrhosis likely ABRAHAM per GI  Liver enzymes normal  Outpatient follow-up with GI recommended    Anemia  Assessment & Plan  Presented with hemoglobin in the mid tens, hemoglobin 9.5 today, stable  Monitor closely  No signs of bleeding thus far    Acute respiratory failure (HCC)  Assessment & Plan  Patient was required 2L NC overnight and has escalated to 6 L nasal on 12/15  Was able to be weaned down from 4L to 2L. Aattempted to wean patient off oxygen however she desatted to 85%  S/p IV Lasix x 3 days  Will order additional IV Lasix 20 mg x 1 today  Likely secondary to bilateral pleural effusion noted on CT scan  Received IV fluids on  admission for sepsis likely contributing to pleural effusion.    Cellulitis of right upper extremity  Assessment & Plan  Right upper extremity with erythema in the antecubital region  Cellulitis versus possible allergy from cefepime administration yesterday given patient's history of cephalosporin  Transition to Keflex today to complete total 10 days of antibiotic therapy through 12/25  Monitor closely    Bacteremia  Assessment & Plan  Streptococcal bacteremia, possible right upper extremity cellulitis as source.  TTE (limited) negative for endocarditis  CT CAP unremarkable for any source of infection  Transition to Keflex today to complete total 10 days of antibiotics through 12/25  ID following, appreciate recommendations  Repeat blood cultures negative at 48 hours    Acquired hypothyroidism  Assessment & Plan  Continue levothyroxine 25 mcg.    Primary insomnia  Assessment & Plan  At home patient takes melatonin, Ativan and Xanax?  Holding Ativan and Xanax  Continue melatonin    Major depressive disorder, recurrent, in remission, unspecified (HCC)  Assessment & Plan  Patient on haloperidol which is currently on hold.  Continue Cymbalta    Primary hypertension  Assessment & Plan  Blood pressure stable  Continue amlodipine 5 mg daily  Continue lisinopril 10 mg daily  Continue Toprol 100    Ambulatory dysfunction  Assessment & Plan  Patient is unable to walk however is able to at least to pivot and stand from bed.  Physical and Occupational Therapy consult, case management.    Degenerative disc disease, thoracic  Assessment & Plan  Physical and Occupational Therapy consults with case management.    Polypharmacy  Assessment & Plan  Patient is on multiple medications including pain medications, sedatives, antidepressants.    Patient's daughter is in the room and has been advised regarding discontinuation of these medications so that we can fully assess improvement of her mental status.  Resume as able    Stage IV  decubitus ulcer (HCC)  Assessment & Plan  Wound care management  Surgery consulted, no acute intervention recommended  CT showing signs of underlying osteomyelitis though chronicity is uncertain    Stroke-like symptoms  Assessment & Plan  Presented with right facial droop and slurred speech  CT head without any acute abnormality  CTA head and neck showed no large vessel occlusion or acute thrombus but it was a difficult study  Neurology consulted for suspected metabolic encephalopathy in the setting of sepsis  Stroke pathway discontinued  MRI deferred  Mentation now closer to baseline per daughter at bedside.           VTE Pharmacologic Prophylaxis:   Moderate Risk (Score 3-4) - Pharmacological DVT Prophylaxis Ordered: apixaban (Eliquis).    Mobility:   Basic Mobility Inpatient Raw Score: 6  JH-HLM Goal: 2: Bed activities/Dependent transfer  JH-HLM Achieved: 2: Bed activities/Dependent transfer  HLM Goal achieved. Continue to encourage appropriate mobility.    Patient Centered Rounds: I performed bedside rounds with nursing staff today.   Discussions with Specialists or Other Care Team Provider: ID    Education and Discussions with Family / Patient: Updated  (daughter) via phone.    Total Time Spent on Date of Encounter in care of patient: 25 mins. This time was spent on one or more of the following: performing physical exam; counseling and coordination of care; obtaining or reviewing history; documenting in the medical record; reviewing/ordering tests, medications or procedures; communicating with other healthcare professionals and discussing with patient's family/caregivers.    Current Length of Stay: 4 day(s)  Current Patient Status: Inpatient   Certification Statement: The patient will continue to require additional inpatient hospital stay due to IV diuresis  Discharge Plan: Anticipate discharge in 24-48 hrs to rehab facility.    Code Status: Level 2 - DNAR: but accepts endotracheal  intubation    Subjective:   Complains of low back pain.     Objective:     Vitals:   Temp (24hrs), Av.3 °F (36.3 °C), Min:97.2 °F (36.2 °C), Max:97.4 °F (36.3 °C)    Temp:  [97.2 °F (36.2 °C)-97.4 °F (36.3 °C)] 97.3 °F (36.3 °C)  HR:  [61-64] 62  Resp:  [12-20] 16  BP: (112-137)/(41-56) 112/41  SpO2:  [95 %-97 %] 95 %  Body mass index is 40.73 kg/m².     Input and Output Summary (last 24 hours):     Intake/Output Summary (Last 24 hours) at 2023 1534  Last data filed at 2023 1340  Gross per 24 hour   Intake 550 ml   Output 1600 ml   Net -1050 ml       Physical Exam:   Physical Exam  Vitals reviewed.   Constitutional:       General: She is not in acute distress.     Appearance: Normal appearance. She is not ill-appearing.   HENT:      Head: Normocephalic and atraumatic.   Cardiovascular:      Rate and Rhythm: Normal rate and regular rhythm.      Heart sounds: Normal heart sounds. No murmur heard.  Pulmonary:      Effort: Pulmonary effort is normal. No respiratory distress.      Comments: 2L  Abdominal:      General: Bowel sounds are normal.      Tenderness: There is no abdominal tenderness.   Musculoskeletal:      Right lower leg: Edema present.      Left lower leg: Edema present.   Skin:     General: Skin is warm and dry.   Neurological:      General: No focal deficit present.      Mental Status: She is alert. Mental status is at baseline.          Additional Data:     Labs:  Results from last 7 days   Lab Units 23  0541 23  0542 23  0638   WBC Thousand/uL 17.87*   < > 26.91*   HEMOGLOBIN g/dL 9.5*   < > 9.6*   HEMATOCRIT % 29.1*   < > 29.7*   PLATELETS Thousands/uL 182   < > 161   BANDS PCT %  --   --  16*   LYMPHO PCT %  --   --  2*   MONO PCT %  --   --  4   EOS PCT %  --   --  0    < > = values in this interval not displayed.     Results from last 7 days   Lab Units 23  0541 23  0542 23  0638   SODIUM mmol/L 132*   < > 136   POTASSIUM mmol/L 3.9   < > 3.8    CHLORIDE mmol/L 102   < > 105   CO2 mmol/L 24   < > 23   BUN mg/dL 34*   < > 36*   CREATININE mg/dL 0.80   < > 0.97   ANION GAP mmol/L 6   < > 8   CALCIUM mg/dL 7.3*   < > 7.6*   ALBUMIN g/dL  --   --  1.9*   TOTAL BILIRUBIN mg/dL  --   --  0.90   ALK PHOS U/L  --   --  135*   ALT U/L  --   --  13   AST U/L  --   --  42*   GLUCOSE RANDOM mg/dL 97   < > 102    < > = values in this interval not displayed.     Results from last 7 days   Lab Units 12/15/23  0447   INR  2.09*     Results from last 7 days   Lab Units 12/14/23  1708   POC GLUCOSE mg/dl 182*         Results from last 7 days   Lab Units 12/14/23 2016   LACTIC ACID mmol/L 1.6       Lines/Drains:  Invasive Devices       Peripheral Intravenous Line  Duration             Peripheral IV 12/18/23 Dorsal (posterior);Right Hand <1 day                          Imaging: No pertinent imaging reviewed.    Recent Cultures (last 7 days):   Results from last 7 days   Lab Units 12/16/23  0638 12/14/23 2016   BLOOD CULTURE  No Growth at 48 hrs.  No Growth at 48 hrs. Beta Hemolytic Streptococcus Group G*  Beta Hemolytic Streptococcus Group G*   GRAM STAIN RESULT   --  Gram positive cocci in pairs and chains*  Gram positive cocci in pairs and chains*       Last 24 Hours Medication List:   Current Facility-Administered Medications   Medication Dose Route Frequency Provider Last Rate    acetaminophen  975 mg Oral Q8H [facility] Edenilson Kramer,       aluminum-magnesium hydroxide-simethicone  30 mL Oral Q6H PRN Aurelio Kelly MD      amiodarone  200 mg Oral Daily Aurelio Kelly MD      amLODIPine  5 mg Oral Daily Aurelio Kelly MD      And    atorvastatin  10 mg Oral Daily Aurelio Kelly MD      ammonium lactate  1 Application Topical Daily Aurelio Kelly MD      apixaban  5 mg Oral BID Juannishkdee Kramer, DO      bisacodyl  10 mg Rectal Daily PRN Edenilson Edwardsel, DO      cephalexin  500 mg Oral Q6H [facility] Darcy Rushing MD      Diclofenac Sodium   2 g Topical 4x Daily Aurelio Kelly MD      DULoxetine  30 mg Oral Daily Edenilson Kramer,       ferrous sulfate  325 mg Oral Q48H Aurelio Kelly MD      Fluticasone Furoate-Vilanterol  1 puff Inhalation Daily Aurelio Kelly MD      HYDROmorphone  0.2 mg Intravenous Q3H PRN Edenilson Kramer,       hyoscyamine  0.125 mg Oral Q6H PRN Aurelio Kelly MD      latanoprost  1 drop Both Eyes HS Aurelio Kelly MD      levothyroxine  25 mcg Oral Early Morning Aurelio Kelly MD      lidocaine  1 patch Topical Daily Aurelio Kelly MD      lisinopril  10 mg Oral Daily Aurelio Kelly MD      melatonin  3 mg Oral HS Edenilson Kramer,       metoprolol succinate  100 mg Oral Daily Aurelio Kelly MD      ondansetron  4 mg Intravenous Q6H PRN Aurelio Kelly MD      oxyCODONE  10 mg Oral Q6H PRN Edenilson Kramer,       oxyCODONE  5 mg Oral Q6H PRN Edenilson Kramer,       polyethylene glycol  17 g Oral Daily PRN Edenilson Kramer,       pseudoephedrine  60 mg Oral Q6H PRN Aurelio Kelly MD      senna-docusate sodium  1 tablet Oral HS Edenilson Kramer DO          Today, Patient Was Seen By: Edenilson Kramer DO    **Please Note: This note may have been constructed using a voice recognition system.**

## 2023-12-19 LAB
ANION GAP SERPL CALCULATED.3IONS-SCNC: 6 MMOL/L
ANION GAP SERPL CALCULATED.3IONS-SCNC: 6 MMOL/L
BASOPHILS # BLD MANUAL: 0.34 THOUSAND/UL (ref 0–0.1)
BASOPHILS # BLD MANUAL: 0.34 THOUSAND/UL (ref 0–0.1)
BASOPHILS NFR MAR MANUAL: 2 % (ref 0–1)
BASOPHILS NFR MAR MANUAL: 2 % (ref 0–1)
BUN SERPL-MCNC: 40 MG/DL (ref 5–25)
BUN SERPL-MCNC: 40 MG/DL (ref 5–25)
CALCIUM SERPL-MCNC: 7.1 MG/DL (ref 8.4–10.2)
CALCIUM SERPL-MCNC: 7.1 MG/DL (ref 8.4–10.2)
CHLORIDE SERPL-SCNC: 102 MMOL/L (ref 96–108)
CHLORIDE SERPL-SCNC: 102 MMOL/L (ref 96–108)
CO2 SERPL-SCNC: 23 MMOL/L (ref 21–32)
CO2 SERPL-SCNC: 23 MMOL/L (ref 21–32)
CREAT SERPL-MCNC: 0.82 MG/DL (ref 0.6–1.3)
CREAT SERPL-MCNC: 0.82 MG/DL (ref 0.6–1.3)
EOSINOPHIL # BLD MANUAL: 0.17 THOUSAND/UL (ref 0–0.4)
EOSINOPHIL # BLD MANUAL: 0.17 THOUSAND/UL (ref 0–0.4)
EOSINOPHIL NFR BLD MANUAL: 1 % (ref 0–6)
EOSINOPHIL NFR BLD MANUAL: 1 % (ref 0–6)
ERYTHROCYTE [DISTWIDTH] IN BLOOD BY AUTOMATED COUNT: 17.3 % (ref 11.6–15.1)
ERYTHROCYTE [DISTWIDTH] IN BLOOD BY AUTOMATED COUNT: 17.3 % (ref 11.6–15.1)
GFR SERPL CREATININE-BSD FRML MDRD: 65 ML/MIN/1.73SQ M
GFR SERPL CREATININE-BSD FRML MDRD: 65 ML/MIN/1.73SQ M
GLUCOSE SERPL-MCNC: 78 MG/DL (ref 65–140)
GLUCOSE SERPL-MCNC: 78 MG/DL (ref 65–140)
HCT VFR BLD AUTO: 27.7 % (ref 34.8–46.1)
HCT VFR BLD AUTO: 27.7 % (ref 34.8–46.1)
HGB BLD-MCNC: 8.6 G/DL (ref 11.5–15.4)
HGB BLD-MCNC: 8.6 G/DL (ref 11.5–15.4)
LYMPHOCYTES # BLD AUTO: 0.51 THOUSAND/UL (ref 0.6–4.47)
LYMPHOCYTES # BLD AUTO: 0.51 THOUSAND/UL (ref 0.6–4.47)
LYMPHOCYTES # BLD AUTO: 3 % (ref 14–44)
LYMPHOCYTES # BLD AUTO: 3 % (ref 14–44)
MAGNESIUM SERPL-MCNC: 1.9 MG/DL (ref 1.9–2.7)
MAGNESIUM SERPL-MCNC: 1.9 MG/DL (ref 1.9–2.7)
MCH RBC QN AUTO: 26.8 PG (ref 26.8–34.3)
MCH RBC QN AUTO: 26.8 PG (ref 26.8–34.3)
MCHC RBC AUTO-ENTMCNC: 31 G/DL (ref 31.4–37.4)
MCHC RBC AUTO-ENTMCNC: 31 G/DL (ref 31.4–37.4)
MCV RBC AUTO: 86 FL (ref 82–98)
MCV RBC AUTO: 86 FL (ref 82–98)
MONOCYTES # BLD AUTO: 0.51 THOUSAND/UL (ref 0–1.22)
MONOCYTES # BLD AUTO: 0.51 THOUSAND/UL (ref 0–1.22)
MONOCYTES NFR BLD: 3 % (ref 4–12)
MONOCYTES NFR BLD: 3 % (ref 4–12)
MYELOCYTES NFR BLD MANUAL: 2 % (ref 0–1)
MYELOCYTES NFR BLD MANUAL: 2 % (ref 0–1)
NEUTROPHILS # BLD MANUAL: 14.99 THOUSAND/UL (ref 1.85–7.62)
NEUTROPHILS # BLD MANUAL: 14.99 THOUSAND/UL (ref 1.85–7.62)
NEUTS BAND NFR BLD MANUAL: 8 % (ref 0–8)
NEUTS BAND NFR BLD MANUAL: 8 % (ref 0–8)
NEUTS SEG NFR BLD AUTO: 81 % (ref 43–75)
NEUTS SEG NFR BLD AUTO: 81 % (ref 43–75)
PLATELET # BLD AUTO: 189 THOUSANDS/UL (ref 149–390)
PLATELET # BLD AUTO: 189 THOUSANDS/UL (ref 149–390)
PLATELET BLD QL SMEAR: ADEQUATE
PLATELET BLD QL SMEAR: ADEQUATE
PMV BLD AUTO: 11.5 FL (ref 8.9–12.7)
PMV BLD AUTO: 11.5 FL (ref 8.9–12.7)
POTASSIUM SERPL-SCNC: 4.4 MMOL/L (ref 3.5–5.3)
POTASSIUM SERPL-SCNC: 4.4 MMOL/L (ref 3.5–5.3)
RBC # BLD AUTO: 3.21 MILLION/UL (ref 3.81–5.12)
RBC # BLD AUTO: 3.21 MILLION/UL (ref 3.81–5.12)
RBC MORPH BLD: NORMAL
RBC MORPH BLD: NORMAL
SODIUM SERPL-SCNC: 131 MMOL/L (ref 135–147)
SODIUM SERPL-SCNC: 131 MMOL/L (ref 135–147)
WBC # BLD AUTO: 16.84 THOUSAND/UL (ref 4.31–10.16)
WBC # BLD AUTO: 16.84 THOUSAND/UL (ref 4.31–10.16)

## 2023-12-19 PROCEDURE — 99232 SBSQ HOSP IP/OBS MODERATE 35: CPT | Performed by: FAMILY MEDICINE

## 2023-12-19 PROCEDURE — 83735 ASSAY OF MAGNESIUM: CPT | Performed by: STUDENT IN AN ORGANIZED HEALTH CARE EDUCATION/TRAINING PROGRAM

## 2023-12-19 PROCEDURE — 85007 BL SMEAR W/DIFF WBC COUNT: CPT | Performed by: STUDENT IN AN ORGANIZED HEALTH CARE EDUCATION/TRAINING PROGRAM

## 2023-12-19 PROCEDURE — 80048 BASIC METABOLIC PNL TOTAL CA: CPT | Performed by: STUDENT IN AN ORGANIZED HEALTH CARE EDUCATION/TRAINING PROGRAM

## 2023-12-19 PROCEDURE — 97110 THERAPEUTIC EXERCISES: CPT

## 2023-12-19 PROCEDURE — 97112 NEUROMUSCULAR REEDUCATION: CPT

## 2023-12-19 PROCEDURE — 99233 SBSQ HOSP IP/OBS HIGH 50: CPT | Performed by: INTERNAL MEDICINE

## 2023-12-19 PROCEDURE — 85027 COMPLETE CBC AUTOMATED: CPT | Performed by: STUDENT IN AN ORGANIZED HEALTH CARE EDUCATION/TRAINING PROGRAM

## 2023-12-19 PROCEDURE — 97530 THERAPEUTIC ACTIVITIES: CPT

## 2023-12-19 RX ADMIN — LISINOPRIL 10 MG: 10 TABLET ORAL at 09:25

## 2023-12-19 RX ADMIN — DULOXETINE HYDROCHLORIDE 30 MG: 30 CAPSULE, DELAYED RELEASE ORAL at 09:25

## 2023-12-19 RX ADMIN — HYDROMORPHONE HYDROCHLORIDE 0.2 MG: 0.2 INJECTION, SOLUTION INTRAMUSCULAR; INTRAVENOUS; SUBCUTANEOUS at 13:41

## 2023-12-19 RX ADMIN — AMLODIPINE BESYLATE 5 MG: 5 TABLET ORAL at 09:25

## 2023-12-19 RX ADMIN — LIDOCAINE 5% 1 PATCH: 700 PATCH TOPICAL at 09:25

## 2023-12-19 RX ADMIN — AMIODARONE HYDROCHLORIDE 200 MG: 200 TABLET ORAL at 09:25

## 2023-12-19 RX ADMIN — Medication 1 APPLICATION: at 09:30

## 2023-12-19 RX ADMIN — CEPHALEXIN 500 MG: 500 CAPSULE ORAL at 11:42

## 2023-12-19 RX ADMIN — CEPHALEXIN 500 MG: 500 CAPSULE ORAL at 00:20

## 2023-12-19 RX ADMIN — ACETAMINOPHEN 975 MG: 325 TABLET, FILM COATED ORAL at 13:41

## 2023-12-19 RX ADMIN — ATORVASTATIN CALCIUM 10 MG: 10 TABLET, FILM COATED ORAL at 09:25

## 2023-12-19 RX ADMIN — ACETAMINOPHEN 975 MG: 325 TABLET, FILM COATED ORAL at 05:51

## 2023-12-19 RX ADMIN — APIXABAN 5 MG: 5 TABLET, FILM COATED ORAL at 17:04

## 2023-12-19 RX ADMIN — ACETAMINOPHEN 975 MG: 325 TABLET, FILM COATED ORAL at 21:59

## 2023-12-19 RX ADMIN — OXYCODONE HYDROCHLORIDE 10 MG: 10 TABLET ORAL at 17:03

## 2023-12-19 RX ADMIN — Medication 3 MG: at 21:59

## 2023-12-19 RX ADMIN — LEVOTHYROXINE SODIUM 25 MCG: 25 TABLET ORAL at 05:51

## 2023-12-19 RX ADMIN — APIXABAN 5 MG: 5 TABLET, FILM COATED ORAL at 09:25

## 2023-12-19 RX ADMIN — METOPROLOL SUCCINATE 100 MG: 100 TABLET, EXTENDED RELEASE ORAL at 09:25

## 2023-12-19 RX ADMIN — OXYCODONE HYDROCHLORIDE 10 MG: 10 TABLET ORAL at 09:26

## 2023-12-19 RX ADMIN — CEPHALEXIN 500 MG: 500 CAPSULE ORAL at 21:59

## 2023-12-19 RX ADMIN — CEPHALEXIN 500 MG: 500 CAPSULE ORAL at 17:04

## 2023-12-19 RX ADMIN — FLUTICASONE FUROATE AND VILANTEROL TRIFENATATE 1 PUFF: 100; 25 POWDER RESPIRATORY (INHALATION) at 09:27

## 2023-12-19 RX ADMIN — Medication 2 G: at 17:04

## 2023-12-19 RX ADMIN — SENNOSIDES, DOCUSATE SODIUM 1 TABLET: 8.6; 5 TABLET ORAL at 21:59

## 2023-12-19 RX ADMIN — Medication 2 G: at 09:27

## 2023-12-19 RX ADMIN — LATANOPROST 1 DROP: 50 SOLUTION/ DROPS OPHTHALMIC at 22:00

## 2023-12-19 RX ADMIN — Medication 2 G: at 11:43

## 2023-12-19 RX ADMIN — CEPHALEXIN 500 MG: 500 CAPSULE ORAL at 05:51

## 2023-12-19 NOTE — PROGRESS NOTES
Progress Note - Infectious Disease   Lucero Booth 88 y.o. female MRN: [id number]  Unit/Bed#: [facility] 734-01 Encounter: [id number]      Impression/Recommendations:  Sepsis, POA.    WBC, RR.  Due to bacteremia.  No other appreciable source.  UA, flu/RSV/COVID PCR, CT C/A/P otherwise negative.  Improving.  Rec:  Continue antibiotics as below  Follow temperatures closely  Recheck WBC in AM to monitor infection  Supportive care as per the primary service     Group G Strep bacteremia.    Consider due to RUE cellulitis versus other skin source given multiple wounds as below.  Doubt relation to recent pseudoaneurysm given normal exam and imaging.  No indwelling intravascular devices.  Repeat blood cultures 12/16 negative.  TTE (technically difficult) negative.  Rec:  Continue Keflex through 12/25 to complete 10 days total antibiotics     RUE cellulitis.    Unclear etiology.  Unusual location and distribution.  Improving.  Rec:  Continue antibiotics as above  Serial exams     Left upper arm wound.  Possibly pressure wound due to overall appearance.  No apparent superinfection.  Rec:  LWC per nursing     Chronic sacral decubitus ulcer.  No apparent acute superinfection on photo review.  Consider chronic osteomyelitis given CT findings.  Rec:  LWC per nursing     Acute encephalopathy.  Suspect toxic metabolic due to sepsis as above.  CT head, CTA negative.  Improved.     Recent L SFA pseudoaneurysm.  Thought iatrogenic from recent Micra pacer placement at OSH.  Status post thrombin injection 11/3/23 with resolution on repeat Doppler.  No noted abnormality on CT A/P this admission although non-contrast and exam benign.     Cirrhosis.  Thought due to ABRAHAM.  T. Bili slightly elevated which may be due to sepsis as above, now improved.  CT A/P unremarkable.     Status post spinal fusion.  Chronic back pain which patient reports is stable.     Cephalosporin allergy.  Unknown reaction.  Received cefepime in ED without issue.   Tolerating cefazolin.     The patient is stable from an ID standpoint.     Antibiotics:  Cefazolin #4  Antibiotics #4    Subjective/24 Hour Events:  Patient seen on AM rounds.  Sleeping.  No events noted.  On 2L O2 this AM.    Objective:  Vitals:  Temp:  [97.3 °F (36.3 °C)-98.5 °F (36.9 °C)] 97.3 °F (36.3 °C)  HR:  [62-67] 66  Resp:  [15-20] 15  BP: (112-119)/(41-51) 114/46  SpO2:  [93 %-95 %] 95 %  Temp (24hrs), Av.6 °F (36.4 °C), Min:97.3 °F (36.3 °C), Max:98.5 °F (36.9 °C)  Current: Temperature: (!) 97.3 °F (36.3 °C)    Physical Exam:   General:  No acute distress  Psychiatric:  Sleeping  Pulmonary:  Normal respiratory excursion without accessory muscle use  Abdomen:  Soft, nontender  Extremities:  No edema  Skin:  No rashes    Lab Results:  I have personally reviewed pertinent labs.  Results from last 7 days   Lab Units 23  0522 23  0541 23  0542 23  0638 12/15/23  0447   POTASSIUM mmol/L 4.4 3.9 4.1 3.8 3.4*   CHLORIDE mmol/L 102 102 103 105 100   CO2 mmol/L 23 24 24 23 21   BUN mg/dL 40* 34* 35* 36* 34*   CREATININE mg/dL 0.82 0.80 0.93 0.97 0.96   EGFR ml/min/1.73sq m 65 66 55 53 53   CALCIUM mg/dL 7.1* 7.3* 7.2* 7.6* 7.7*   AST U/L  --   --   --  42* 28   ALT U/L  --   --   --  13 12   ALK PHOS U/L  --   --   --  135* 127*     Results from last 7 days   Lab Units 23  0522 23  0541 23  0542   WBC Thousand/uL 16.84* 17.87* 21.42*   HEMOGLOBIN g/dL 8.6* 9.5* 8.9*   PLATELETS Thousands/uL 189 182 153     Results from last 7 days   Lab Units 23  0638 12/15/23  0456 23   BLOOD CULTURE  No Growth at 72 hrs.  No Growth at 72 hrs.  --  Beta Hemolytic Streptococcus Group G*  Beta Hemolytic Streptococcus Group G*   GRAM STAIN RESULT   --   --  Gram positive cocci in pairs and chains*  Gram positive cocci in pairs and chains*   MRSA CULTURE ONLY   --  Methicillin Resistant Staphylococcus aureus isolated*  Please note: This patient requires contact  precautions.  --        Imaging Studies:   I have personally reviewed pertinent imaging study reports and images in PACS.    EKG, Pathology, and Other Studies:   I have personally reviewed pertinent reports.

## 2023-12-19 NOTE — PROGRESS NOTES
[facility]  Progress Note  Name: Lucero Booth I  MRN: [id number]  Unit/Bed#: [facility] 734-01 I Date of Admission: 12/14/2023   Date of Service: 12/19/2023 I Hospital Day: 5    Assessment/Plan   * Sepsis (HCC)  Assessment & Plan  Evidenced by tachypnea and leukocytosis  Blood cultures with beta-hemolytic Streptococcus group G  Possible source of right upper extremity cellulitis  UA/flu/RSV/COVID unremarkable  CT CAP no other acute findings of infection aside from likely chronic osteomyelitis underlying sacral decubitus ulcer  Continue Keflex until December 25, 2023 for total of 10 days  ID following, appreciate recommendations  WBC appear to be trending down    Atrial fibrillation (HCC)  Assessment & Plan  Patient is rate controlled on metoprolol and anticoagulated on Eliquis    ABRAHAM (nonalcoholic steatohepatitis)  Assessment & Plan  Imaging suggestive of cirrhosis likely ABRAAHM per GI  Liver enzymes normal  Outpatient follow-up with GI recommended    Anemia  Assessment & Plan  Today's hemoglobin is 8.6  Monitor closely  No signs of bleeding thus far  Will transfuse below 7    Acute respiratory failure (HCC)  Assessment & Plan  Patient was able to be titrated down to room air  Will closely monitor and supplement if oxygen saturation drops below 89  Likely secondary to bilateral pleural effusion noted on CT scan  Received IV fluids on admission for sepsis likely contributing to pleural effusion.    Cellulitis of right upper extremity  Assessment & Plan  Right upper extremity with erythema in the antecubital region  Cellulitis versus possible allergy from cefepime administration yesterday given patient's history of cephalosporin  Transition to Keflex today to complete total 10 days of antibiotic therapy through 12/25  Monitor closely    Bacteremia  Assessment & Plan  Streptococcal bacteremia, possible right upper extremity cellulitis as source.  TTE (limited) negative for  endocarditis  CT CAP unremarkable for any source of infection  Transition to Keflex today to complete total 10 days of antibiotics through 12/25  ID following, appreciate recommendations  Repeat blood cultures negative     Elevated troponin  Assessment & Plan  Patient with elevated troponin on first determination however became within normal limits on second determination.  Most likely could be a consequence of infection.    Acquired hypothyroidism  Assessment & Plan  Continue levothyroxine 25 mcg.    Primary insomnia  Assessment & Plan  At home patient takes melatonin, Ativan and Xanax  Holding Ativan and Xanax  Continue melatonin    Major depressive disorder, recurrent, in remission, unspecified (HCC)  Assessment & Plan  Patient on haloperidol which is currently on hold.  Continue Cymbalta    Primary hypertension  Assessment & Plan  Blood pressure is soft  Hold amlodipine 5 mg daily  Continue lisinopril 10 mg daily  Continue Toprol 100    Ambulatory dysfunction  Assessment & Plan  Physical and Occupational Therapy consult, case management.    Degenerative disc disease, thoracic  Assessment & Plan  Physical and Occupational Therapy consults with case management.    Polypharmacy  Assessment & Plan  Patient is on multiple medications including pain medications, sedatives, antidepressants.    Patient's daughter is in the room and has been advised regarding discontinuation of these medications so that we can fully assess improvement of her mental status.  Resume as able    Stage IV decubitus ulcer (HCC)  Assessment & Plan  Wound care management  Surgery consulted, no acute intervention recommended  CT showing signs of underlying osteomyelitis though chronicity is uncertain    Acute metabolic encephalopathy  Assessment & Plan  Initially presenting with strokelike symptoms with right-sided facial droop and generalized weakness with slurred speech.    CT head and CTA head and neck unremarkable for any acute stroke  MRI  deferred by neurology  Neurology suspects metabolic encephalopathy in setting of sepsis  Holding home Haldol and Xanax at this time given encephalopathy  See additional management per sepsis A/P  12/18: Mentation at baseline  12/19 Mentation continues to be at baseline    Stroke-like symptoms  Assessment & Plan  Presented with right facial droop and slurred speech  CT head without any acute abnormality  CTA head and neck showed no large vessel occlusion or acute thrombus but it was a difficult study  Neurology consulted for suspected metabolic encephalopathy in the setting of sepsis  Stroke pathway discontinued  MRI deferred  Mentation now closer to baseline per daughter at bedside.               VTE Pharmacologic Prophylaxis:   Moderate Risk (Score 3-4) - Pharmacological DVT Prophylaxis Ordered: apixaban (Eliquis).    Mobility:   Basic Mobility Inpatient Raw Score: 6  JH-HLM Goal: 2: Bed activities/Dependent transfer  JH-HLM Achieved: 2: Bed activities/Dependent transfer  HLM Goal achieved. Continue to encourage appropriate mobility.    Patient Centered Rounds: I performed bedside rounds with nursing staff today.   Discussions with Specialists or Other Care Team Provider: Reviewed notes by infectious disease    Education and Discussions with Family / Patient: Updated  (daughter) via phone.    Total Time Spent on Date of Encounter in care of patient: 35 mins. This time was spent on one or more of the following: performing physical exam; counseling and coordination of care; obtaining or reviewing history; documenting in the medical record; reviewing/ordering tests, medications or procedures; communicating with other healthcare professionals and discussing with patient's family/caregivers.    Current Length of Stay: 5 day(s)  Current Patient Status: Inpatient   Certification Statement: The patient will continue to require additional inpatient hospital stay due to ABX for Sepsis  Discharge Plan:  Anticipate discharge in 24-48 hrs to rehab facility.    Code Status: Level 2 - DNAR: but accepts endotracheal intubation    Subjective:   Is a very pleasant 88-year-old female who was seen and evaluated at bedside.  Patient has no acute complaints.    Objective:     Vitals:   Temp (24hrs), Av.6 °F (36.4 °C), Min:97.3 °F (36.3 °C), Max:98.5 °F (36.9 °C)    Temp:  [97.3 °F (36.3 °C)-98.5 °F (36.9 °C)] 97.5 °F (36.4 °C)  HR:  [53-67] 53  Resp:  [15-20] 15  BP: ()/(46-60) 100/60  SpO2:  [93 %-95 %] 94 %  Body mass index is 39.6 kg/m².     Input and Output Summary (last 24 hours):     Intake/Output Summary (Last 24 hours) at 2023 1631  Last data filed at 2023 1341  Gross per 24 hour   Intake 340 ml   Output 1250 ml   Net -910 ml       Physical Exam:   Physical Exam  Vitals reviewed.   Constitutional:       General: She is not in acute distress.     Appearance: Normal appearance. She is not ill-appearing.   HENT:      Head: Normocephalic and atraumatic.      Right Ear: External ear normal.      Left Ear: External ear normal.      Nose: Nose normal.   Eyes:      Extraocular Movements: Extraocular movements intact.      Conjunctiva/sclera: Conjunctivae normal.   Cardiovascular:      Rate and Rhythm: Normal rate and regular rhythm.      Pulses: Normal pulses.      Heart sounds: Normal heart sounds.   Pulmonary:      Effort: Pulmonary effort is normal.      Breath sounds: Normal breath sounds.   Abdominal:      General: Abdomen is flat. Bowel sounds are normal.      Palpations: Abdomen is soft.   Musculoskeletal:         General: No deformity or signs of injury.      Cervical back: Normal range of motion.   Skin:     General: Skin is warm and dry.   Neurological:      General: No focal deficit present.      Mental Status: She is alert. Mental status is at baseline.   Psychiatric:         Mood and Affect: Mood normal.         Behavior: Behavior normal.          Additional Data:     Labs:  Results from  last 7 days   Lab Units 12/19/23  0522   WBC Thousand/uL 16.84*   HEMOGLOBIN g/dL 8.6*   HEMATOCRIT % 27.7*   PLATELETS Thousands/uL 189   BANDS PCT % 8   LYMPHO PCT % 3*   MONO PCT % 3*   EOS PCT % 1     Results from last 7 days   Lab Units 12/19/23  0522 12/17/23  0542 12/16/23  0638   SODIUM mmol/L 131*   < > 136   POTASSIUM mmol/L 4.4   < > 3.8   CHLORIDE mmol/L 102   < > 105   CO2 mmol/L 23   < > 23   BUN mg/dL 40*   < > 36*   CREATININE mg/dL 0.82   < > 0.97   ANION GAP mmol/L 6   < > 8   CALCIUM mg/dL 7.1*   < > 7.6*   ALBUMIN g/dL  --   --  1.9*   TOTAL BILIRUBIN mg/dL  --   --  0.90   ALK PHOS U/L  --   --  135*   ALT U/L  --   --  13   AST U/L  --   --  42*   GLUCOSE RANDOM mg/dL 78   < > 102    < > = values in this interval not displayed.     Results from last 7 days   Lab Units 12/15/23  0447   INR  2.09*     Results from last 7 days   Lab Units 12/14/23  1708   POC GLUCOSE mg/dl 182*         Results from last 7 days   Lab Units 12/14/23 2016   LACTIC ACID mmol/L 1.6       Lines/Drains:  Invasive Devices       Peripheral Intravenous Line  Duration             Peripheral IV 12/18/23 Dorsal (posterior);Right Hand 1 day              Drain  Duration             External Urinary Catheter 1 day                          Imaging: Reviewed radiology reports from this admission including: chest CT scan    Recent Cultures (last 7 days):   Results from last 7 days   Lab Units 12/16/23  0638 12/14/23 2016   BLOOD CULTURE  No Growth at 72 hrs.  No Growth at 72 hrs. Beta Hemolytic Streptococcus Group G*  Beta Hemolytic Streptococcus Group G*   GRAM STAIN RESULT   --  Gram positive cocci in pairs and chains*  Gram positive cocci in pairs and chains*       Last 24 Hours Medication List:   Current Facility-Administered Medications   Medication Dose Route Frequency Provider Last Rate    acetaminophen  975 mg Oral Q8H REBECCA Kramer DO      aluminum-magnesium hydroxide-simethicone  30 mL Oral Q6H PRN Aurelio  Sukhdev Kelly MD      amiodarone  200 mg Oral Daily Aurelio Kelly MD      ammonium lactate  1 Application Topical Daily Aurelio Kelly MD      apixaban  5 mg Oral BID Harnishkumar Kramer, DO      atorvastatin  10 mg Oral Daily Aureliomayra Kelly MD      bisacodyl  10 mg Rectal Daily PRN Harnishkumar Kramer, DO      cephalexin  500 mg Oral Q6H [facility] Darcy Rushing MD      Diclofenac Sodium  2 g Topical 4x Daily Aureliomayra Kelly MD      DULoxetine  30 mg Oral Daily Harnishkumar Kramer, DO      ferrous sulfate  325 mg Oral Q48H Aurelio Kelly MD      Fluticasone Furoate-Vilanterol  1 puff Inhalation Daily Aurelio Kelly MD      HYDROmorphone  0.2 mg Intravenous Q3H PRN Gabikdee Kramer, DO      hyoscyamine  0.125 mg Oral Q6H PRN Aurelio Kelly MD      latanoprost  1 drop Both Eyes HS Aurelio Sukhdev Kelly MD      levothyroxine  25 mcg Oral Early Morning Aureliomyara Kelly MD      lidocaine  1 patch Topical Daily Aurelio Kelly MD      lisinopril  10 mg Oral Daily Aurelio Sukhdev Kelly MD      melatonin  3 mg Oral HS [facility] K[facility], [facility]      metoprolol succinate  100 mg Oral Daily Aureliomayra Kelly MD      ondansetron  4 mg Intravenous Q6H PRN Aurelio Kelly MD      oxyCODONE  10 mg Oral Q6H PRN Harnishkumar Kramer, DO      oxyCODONE  5 mg Oral Q6H PRN Harnishkdee Kramer, DO      polyethylene glycol  17 g Oral Daily PRN Harnishkdee Kramer, DO      pseudoephedrine  60 mg Oral Q6H PRN Aurelio Kelly MD      senna-docusate sodium  1 tablet Oral HS [facility], [facility]          Today, Patient Was Seen By: Lc Wilburn MD    **Please Note: This note may have been constructed using a voice recognition system.**

## 2023-12-19 NOTE — PLAN OF CARE
Problem: Prexisting or High Potential for Compromised Skin Integrity  Goal: Skin integrity is maintained or improved  Description: INTERVENTIONS:  - Identify patients at risk for skin breakdown  - Assess and monitor skin integrity  - Assess and monitor nutrition and hydration status  - Monitor labs   - Assess for incontinence   - Turn and reposition patient  - Assist with mobility/ambulation  - Relieve pressure over bony prominences  - Avoid friction and shearing  - Provide appropriate hygiene as needed including keeping skin clean and dry  - Evaluate need for skin moisturizer/barrier cream  - Collaborate with interdisciplinary team   - Patient/family teaching  - Consider wound care consult   12/19/2023 0351 by Suzanna Wahl RN  Outcome: Progressing  12/19/2023 0351 by Suzanna Wahl RN  Outcome: Progressing     Problem: PAIN - ADULT  Goal: Verbalizes/displays adequate comfort level or baseline comfort level  Description: Interventions:  - Encourage patient to monitor pain and request assistance  - Assess pain using appropriate pain scale  - Administer analgesics based on type and severity of pain and evaluate response  - Implement non-pharmacological measures as appropriate and evaluate response  - Notify physician/advanced practitioner if interventions unsuccessful or patient reports new pain  12/19/2023 0351 by Suzanna Wahl RN  Outcome: Progressing  12/19/2023 0351 by Suzanna Wahl RN  Outcome: Progressing     Problem: INFECTION - ADULT  Goal: Absence or prevention of progression during hospitalization  Description: INTERVENTIONS:  - Assess and monitor for signs and symptoms of infection  - Monitor lab/diagnostic results  - Monitor all insertion sites, i.e. indwelling lines, tubes, and drains  - [place] appropriate cooling/warming therapies per order  - Administer medications as ordered  - Instruct and encourage patient and family to use good hand hygiene technique  - Identify and instruct in appropriate  isolation precautions for identified infection/condition  12/19/2023 0351 by Suzanna Wahl RN  Outcome: Progressing  12/19/2023 0351 by Suzanna Wahl RN  Outcome: Progressing     Problem: SAFETY ADULT  Goal: Patient will remain free of falls  Description: INTERVENTIONS:  - Educate patient/family on patient safety including physical limitations  - Instruct patient to call for assistance with activity   - Consult OT/PT to assist with strengthening/mobility   - Keep Call bell within reach  - Keep bed low and locked with side rails adjusted as appropriate  - Keep care items and personal belongings within reach  - Initiate and maintain comfort rounds  - Make Fall Risk Sign visible to staff  - Offer Toileting every 2 Hours, in advance of need  - Initiate/Maintain bed alarm  - Obtain necessary fall risk management equipment.  - Apply yellow socks and bracelet for high fall risk patients  - Consider moving patient to room near nurses station  12/19/2023 0351 by Suzanna Wahl RN  Outcome: Progressing  12/19/2023 0351 by Suzanna Wahl RN  Outcome: Progressing

## 2023-12-19 NOTE — ASSESSMENT & PLAN NOTE
Initially presenting with strokelike symptoms with right-sided facial droop and generalized weakness with slurred speech.    CT head and CTA head and neck unremarkable for any acute stroke  MRI deferred by neurology  Neurology suspects metabolic encephalopathy in setting of sepsis  Holding home Haldol and Xanax at this time given encephalopathy  See additional management per sepsis A/P  12/18: Mentation at baseline  12/19 Mentation continues to be at baseline

## 2023-12-19 NOTE — PLAN OF CARE
Problem: Prexisting or High Potential for Compromised Skin Integrity  Goal: Skin integrity is maintained or improved  Description: INTERVENTIONS:  - Identify patients at risk for skin breakdown  - Assess and monitor skin integrity  - Assess and monitor nutrition and hydration status  - Monitor labs   - Assess for incontinence   - Turn and reposition patient  - Assist with mobility/ambulation  - Relieve pressure over bony prominences  - Avoid friction and shearing  - Provide appropriate hygiene as needed including keeping skin clean and dry  - Evaluate need for skin moisturizer/barrier cream  - Collaborate with interdisciplinary team   - Patient/family teaching  - Consider wound care consult   Outcome: Progressing     Problem: PAIN - ADULT  Goal: Verbalizes/displays adequate comfort level or baseline comfort level  Description: Interventions:  - Encourage patient to monitor pain and request assistance  - Assess pain using appropriate pain scale  - Administer analgesics based on type and severity of pain and evaluate response  - Implement non-pharmacological measures as appropriate and evaluate response  - Notify physician/advanced practitioner if interventions unsuccessful or patient reports new pain  Outcome: Progressing     Problem: INFECTION - ADULT  Goal: Absence or prevention of progression during hospitalization  Description: INTERVENTIONS:  - Assess and monitor for signs and symptoms of infection  - Monitor lab/diagnostic results  - Monitor all insertion sites, i.e. indwelling lines, tubes, and drains  - [place] appropriate cooling/warming therapies per order  - Administer medications as ordered  - Instruct and encourage patient and family to use good hand hygiene technique  - Identify and instruct in appropriate isolation precautions for identified infection/condition  Outcome: Progressing     Problem: SAFETY ADULT  Goal: Patient will remain free of falls  Description: INTERVENTIONS:  - Educate  patient/family on patient safety including physical limitations  - Instruct patient to call for assistance with activity   - Consult OT/PT to assist with strengthening/mobility   - Keep Call bell within reach  - Keep bed low and locked with side rails adjusted as appropriate  - Keep care items and personal belongings within reach  - Initiate and maintain comfort rounds  - Make Fall Risk Sign visible to staff  - Offer Toileting every 2 Hours, in advance of need  - Initiate/Maintain bed alarm  - Obtain necessary fall risk management equipment.  - Apply yellow socks and bracelet for high fall risk patients  - Consider moving patient to room near nurses station  Outcome: Progressing  Goal: Maintain or return to baseline ADL function  Description: INTERVENTIONS:  -  Assess patient's ability to carry out ADLs; assess patient's baseline for ADL function and identify physical deficits which impact ability to perform ADLs (bathing, care of mouth/teeth, toileting, grooming, dressing, etc.)  - Assess/evaluate cause of self-care deficits   - Assess range of motion  - Assess patient's mobility; develop plan if impaired  - Assess patient's need for assistive devices and provide as appropriate  - Encourage maximum independence but intervene and supervise when necessary  - Involve family in performance of ADLs  - Assess for home care needs following discharge   - Consider OT consult to assist with ADL evaluation and planning for discharge  - Provide patient education as appropriate  Outcome: Progressing  Goal: Maintains/Returns to pre admission functional level  Description: INTERVENTIONS:  - Perform AM-PAC 6 Click Basic Mobility/ Daily Activity assessment daily.  - Set and communicate daily mobility goal to care team and patient/family/caregiver.   - Collaborate with rehabilitation services on mobility goals if consulted  - Perform Range of Motion 3 times a day.  - Reposition patient every 2 hours.  - Record patient progress and  toleration of activity level   Outcome: Progressing     Problem: DISCHARGE PLANNING  Goal: Discharge to home or other facility with appropriate resources  Description: INTERVENTIONS:  - Identify barriers to discharge w/patient and caregiver  - Arrange for needed discharge resources and transportation as appropriate  - Identify discharge learning needs (meds, wound care, etc.)  - Refer to Case Management Department for coordinating discharge planning if the patient needs post-hospital services based on physician/advanced practitioner order or complex needs related to functional status, cognitive ability, or social support system  Outcome: Progressing     Problem: Knowledge Deficit  Goal: Patient/family/caregiver demonstrates understanding of disease process, treatment plan, medications, and discharge instructions  Description: Complete learning assessment and assess knowledge base.  Interventions:  - Provide teaching at level of understanding  - Provide teaching via preferred learning methods  Outcome: Progressing     Problem: NEUROSENSORY - ADULT  Goal: Achieves stable or improved neurological status  Description: INTERVENTIONS  - Monitor and report changes in neurological status  - Monitor vital signs such as temperature, blood pressure, glucose, and any other labs ordered   - Initiate measures to prevent increased intracranial pressure  - Monitor for seizure activity and implement precautions if appropriate      Outcome: Progressing  Goal: Remains free of injury related to seizures activity  Description: INTERVENTIONS  - Maintain airway, patient safety  and administer oxygen as ordered  - Monitor patient for seizure activity, document and report duration and description of seizure to physician/advanced practitioner  - If seizure occurs,  ensure patient safety during seizure  - Reorient patient post seizure  - Seizure pads on all 4 side rails  - Instruct patient/family to notify RN of any seizure activity including  if an aura is experienced  - Instruct patient/family to call for assistance with activity based on nursing assessment  - Administer anti-seizure medications if ordered    Outcome: Progressing  Goal: Achieves maximal functionality and self care  Description: INTERVENTIONS  - Monitor swallowing and airway patency with patient fatigue and changes in neurological status  - Encourage and assist patient to increase activity and self care.   - Encourage visually impaired, hearing impaired and aphasic patients to use assistive/communication devices  Outcome: Progressing     Problem: RESPIRATORY - ADULT  Goal: Achieves optimal ventilation and oxygenation  Description: INTERVENTIONS:  - Assess for changes in respiratory status  - Assess for changes in mentation and behavior  - Position to facilitate oxygenation and minimize respiratory effort  - Oxygen administered by appropriate delivery if ordered  - Initiate smoking cessation education as indicated  - Encourage broncho-pulmonary hygiene including cough, deep breathe, Incentive Spirometry  - Assess the need for suctioning and aspirate as needed  - Assess and instruct to report SOB or any respiratory difficulty  - Respiratory Therapy support as indicated  Outcome: Progressing     Problem: SKIN/TISSUE INTEGRITY - ADULT  Goal: Skin Integrity remains intact(Skin Breakdown Prevention)  Description: Assess:  -Perform [place] assessment every shift  -Clean and moisturize skin every 2 hours  -Inspect skin when repositioning, toileting, and assisting with ADLS  -Assess extremities for adequate circulation and sensation     Bed Management:  -Have minimal linens on bed & keep smooth, unwrinkled  -Change linens as needed when moist or perspiring  -Avoid sitting or lying in one position for more than 2 hours while in bed  -Keep HOB at 30 degrees     Toileting:  -Offer bedside commode  -Assess for incontinence every 2 hours  -Use incontinent care products after each incontinent episode  such as barrier cream    Activity:  -Encourage or provide ROM exercises   -Turn and reposition patient every 2 Hours  -Use appropriate equipment to lift or move patient in bed    Skin Care:  -Avoid use of baby powder, tape, friction and shearing, hot water or constrictive clothing  -Relieve pressure over bony prominences using allevyn  -Do not massage red bony areas  Outcome: Progressing  Goal: Incision(s), wounds(s) or drain site(s) healing without S/S of infection  Description: INTERVENTIONS  - Assess and document dressing, incision, wound bed, drain sites and surrounding tissue  - Provide patient and family education  - Perform skin care/dressing changes daily  Outcome: Progressing  Goal: Pressure injury heals and does not worsen  Description: Interventions:  - Implement low air loss mattress or specialty surface (Criteria met)  - Apply silicone foam dressing  - Perform passive or active ROM 4 times per day  - Turn and reposition patient & offload bony prominences every 2 hours   - Utilize friction reducing device or surface for transfers   - Consider consults to  interdisciplinary teams such as wound care  - Consider nutrition services referral as needed  Outcome: Progressing     Problem: MUSCULOSKELETAL - ADULT  Goal: Maintain or return mobility to safest level of function  Description: INTERVENTIONS:  - Assess patient's ability to carry out ADLs; assess patient's baseline for ADL function and identify physical deficits which impact ability to perform ADLs (bathing, care of mouth/teeth, toileting, grooming, dressing, etc.)  - Assess/evaluate cause of self-care deficits   - Assess range of motion  - Assess patient's mobility  - Assess patient's need for assistive devices and provide as appropriate  - Encourage maximum independence but intervene and supervise when necessary  - Involve family in performance of ADLs  - Assess for home care needs following discharge   - Consider OT consult to assist with ADL  evaluation and planning for discharge  - Provide patient education as appropriate  Outcome: Progressing  Goal: Maintain proper alignment of affected body part  Description: INTERVENTIONS:  - Support, maintain and protect limb and body alignment  - Provide patient/ family with appropriate education  Outcome: Progressing     Problem: Nutrition/Hydration-ADULT  Goal: Nutrient/Hydration intake appropriate for improving, restoring or maintaining nutritional needs  Description: Monitor and assess patient's nutrition/hydration status for malnutrition. Collaborate with interdisciplinary team and initiate plan and interventions as ordered.  Monitor patient's weight and dietary intake as ordered or per policy. Utilize nutrition screening tool and intervene as necessary. Determine patient's food preferences and provide high-protein, high-caloric foods as appropriate.     INTERVENTIONS:  - Monitor oral intake, urinary output, labs, and treatment plans  - Assess nutrition and hydration status and recommend course of action  - Evaluate amount of meals eaten  - Assist patient with eating if necessary   - Allow adequate time for meals  - Recommend/ encourage appropriate diets, oral nutritional supplements, and vitamin/mineral supplements  - Order, calculate, and assess calorie counts as needed  - Assess need for intravenous fluids  - Provide specific nutrition/hydration education as appropriate  - Include patient/family/caregiver in decisions related to nutrition  Outcome: Progressing

## 2023-12-19 NOTE — PHYSICAL THERAPY NOTE
Physical Therapy Progress Note     12/19/23 0905   PT Last Visit   PT Visit Date 12/19/23   Note Type   Note Type Treatment   Pain Assessment   Pain Assessment Tool 0-10   Pain Score 10 - Worst Possible Pain   Pain Location/Orientation Orientation: Lower;Location: Back   Hospital Pain Intervention(s) Repositioned;Emotional support   Restrictions/Precautions   Other Precautions Cognitive;Chair Alarm;Bed Alarm;Fall Risk;Pain   Subjective   Subjective The patient was lethargic at first, but she was agreeable to work with therapy after some motivation.   Bed Mobility   Rolling R 2  Maximal assistance   Additional items Assist x 1;Increased time required;Verbal cues;LE management   Rolling L 2  Maximal assistance   Additional items Assist x 1;Increased time required;Verbal cues;LE management   Supine to Sit 2  Maximal assistance   Additional items Assist x 2;HOB elevated;Increased time required;Verbal cues;LE management   Sit to Supine 2  Maximal assistance   Additional items Assist x 2;HOB elevated;Increased time required;Verbal cues;LE management   Transfers   Other 2  Maximal assistance   Additional items Assist x 2;Increased time required;Verbal cues   Additional Comments Lateral scoots bilaterally x3 trials (Left, right, left.)   Balance   Static Sitting Poor +   Dynamic Sitting Poor   Activity Tolerance   Activity Tolerance Patient tolerated treatment well;Patient limited by fatigue   Exercises   TKR Supine;10 reps;Bilateral;AAROM;Sitting;5 reps  (x2 sets seated.)   Balance training  Seated weight shifting onto bilateral elbows 1 min each x 10 repetitions.   Assessment   Prognosis Fair   Problem List Decreased strength;Decreased endurance;Impaired balance;Decreased mobility;Decreased coordination;Decreased cognition;Impaired judgement;Decreased safety awareness;Orthopedic restrictions;Pain   Assessment The patient has improving activity tolerance today as she was able to progress her total sitting time. She  continues to require significant assistance throughout the session. Performed seated weight-shifting bilaterally with 1 min rest periods on her supported elbow. She required continued stimulation during the session in order to remain alert. Will continue to follow in order to safely progress her functional mobility.   Barriers to Discharge Inaccessible home environment;Decreased caregiver support   Goals   Patient Goals To rest.   STG Expiration Date 12/29/23   PT Treatment Day 2   Plan   Treatment/Interventions LE strengthening/ROM;Therapeutic exercise;Endurance training;Cognitive reorientation;Patient/family training;Bed mobility;Functional transfer training;Gait training   Progress Progressing toward goals   PT Frequency 1-2x/wk   Discharge Recommendation   Rehab Resource Intensity Level, PT II (Moderate Resource Intensity)   AM-PAC Basic Mobility Inpatient   Turning in Flat Bed Without Bedrails 2   Lying on Back to Sitting on Edge of Flat Bed Without Bedrails 1   Moving Bed to Chair 1   Standing Up From Chair Using Arms 1   Walk in Room 1   Climb 3-5 Stairs With Railing 1   Basic Mobility Inpatient Raw Score 7   Turning Head Towards Sound 3   Follow Simple Instructions 3   Low Function Basic Mobility Raw Score  13   Low Function Basic Mobility Standardized Score  20.14   Highest Level Of Mobility   JH-HLM Goal 2: Bed activities/Dependent transfer   JH-HLM Achieved 3: Sit at edge of bed         An AM-PAC Basic Mobility raw score less than 16 suggests the patient may benefit from discharge to post-acute rehab services.    Alvaro Lynn, PTA

## 2023-12-19 NOTE — ASSESSMENT & PLAN NOTE
Patient was able to be titrated down to room air  Will closely monitor and supplement if oxygen saturation drops below 89  Likely secondary to bilateral pleural effusion noted on CT scan  Received IV fluids on admission for sepsis likely contributing to pleural effusion.

## 2023-12-19 NOTE — ASSESSMENT & PLAN NOTE
Streptococcal bacteremia, possible right upper extremity cellulitis as source.  TTE (limited) negative for endocarditis  CT CAP unremarkable for any source of infection  Transition to Keflex today to complete total 10 days of antibiotics through 12/25  ID following, appreciate recommendations  Repeat blood cultures negative

## 2023-12-19 NOTE — ASSESSMENT & PLAN NOTE
Blood pressure is soft  Hold amlodipine 5 mg daily  Continue lisinopril 10 mg daily  Continue Toprol 100

## 2023-12-19 NOTE — ASSESSMENT & PLAN NOTE
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

## 2023-12-19 NOTE — PLAN OF CARE
Problem: PHYSICAL THERAPY ADULT  Goal: Performs mobility at highest level of function for planned discharge setting.  See evaluation for individualized goals.  Description: Treatment/Interventions: LE strengthening/ROM, Therapeutic exercise, Functional transfer training, Endurance training, Cognitive reorientation, Patient/family training, Equipment eval/education, Bed mobility, Gait training, Spoke to nursing, Spoke to case management, OT  Equipment Recommended:  (TBD)       See flowsheet documentation for full assessment, interventions and recommendations.  Outcome: Progressing  Note: Prognosis: Fair  Problem List: Decreased strength, Decreased endurance, Impaired balance, Decreased mobility, Decreased coordination, Decreased cognition, Impaired judgement, Decreased safety awareness, Orthopedic restrictions, Pain  Assessment: The patient has improving activity tolerance today as she was able to progress her total sitting time. She continues to require significant assistance throughout the session. Performed seated weight-shifting bilaterally with 1 min rest periods on her supported elbow. She required continued stimulation during the session in order to remain alert. Will continue to follow in order to safely progress her functional mobility.  Barriers to Discharge: Inaccessible home environment, Decreased caregiver support     Rehab Resource Intensity Level, PT: II (Moderate Resource Intensity)    See flowsheet documentation for full assessment.

## 2023-12-20 LAB
ALBUMIN SERPL BCP-MCNC: 1.9 G/DL (ref 3.5–5)
ALBUMIN SERPL BCP-MCNC: 1.9 G/DL (ref 3.5–5)
ALP SERPL-CCNC: 100 U/L (ref 34–104)
ALP SERPL-CCNC: 100 U/L (ref 34–104)
ALT SERPL W P-5'-P-CCNC: <3 U/L (ref 7–52)
ALT SERPL W P-5'-P-CCNC: <3 U/L (ref 7–52)
ANION GAP SERPL CALCULATED.3IONS-SCNC: 5 MMOL/L
ANION GAP SERPL CALCULATED.3IONS-SCNC: 5 MMOL/L
AST SERPL W P-5'-P-CCNC: 19 U/L (ref 13–39)
AST SERPL W P-5'-P-CCNC: 19 U/L (ref 13–39)
BILIRUB SERPL-MCNC: 0.45 MG/DL (ref 0.2–1)
BILIRUB SERPL-MCNC: 0.45 MG/DL (ref 0.2–1)
BUN SERPL-MCNC: 37 MG/DL (ref 5–25)
BUN SERPL-MCNC: 37 MG/DL (ref 5–25)
CALCIUM ALBUM COR SERPL-MCNC: 9.2 MG/DL (ref 8.3–10.1)
CALCIUM ALBUM COR SERPL-MCNC: 9.2 MG/DL (ref 8.3–10.1)
CALCIUM SERPL-MCNC: 7.5 MG/DL (ref 8.4–10.2)
CALCIUM SERPL-MCNC: 7.5 MG/DL (ref 8.4–10.2)
CHLORIDE SERPL-SCNC: 101 MMOL/L (ref 96–108)
CHLORIDE SERPL-SCNC: 101 MMOL/L (ref 96–108)
CO2 SERPL-SCNC: 23 MMOL/L (ref 21–32)
CO2 SERPL-SCNC: 23 MMOL/L (ref 21–32)
CREAT SERPL-MCNC: 0.84 MG/DL (ref 0.6–1.3)
CREAT SERPL-MCNC: 0.84 MG/DL (ref 0.6–1.3)
ERYTHROCYTE [DISTWIDTH] IN BLOOD BY AUTOMATED COUNT: 17.5 % (ref 11.6–15.1)
ERYTHROCYTE [DISTWIDTH] IN BLOOD BY AUTOMATED COUNT: 17.5 % (ref 11.6–15.1)
GFR SERPL CREATININE-BSD FRML MDRD: 63 ML/MIN/1.73SQ M
GFR SERPL CREATININE-BSD FRML MDRD: 63 ML/MIN/1.73SQ M
GLUCOSE SERPL-MCNC: 85 MG/DL (ref 65–140)
GLUCOSE SERPL-MCNC: 85 MG/DL (ref 65–140)
HCT VFR BLD AUTO: 31.2 % (ref 34.8–46.1)
HCT VFR BLD AUTO: 31.2 % (ref 34.8–46.1)
HGB BLD-MCNC: 9.5 G/DL (ref 11.5–15.4)
HGB BLD-MCNC: 9.5 G/DL (ref 11.5–15.4)
MCH RBC QN AUTO: 27.1 PG (ref 26.8–34.3)
MCH RBC QN AUTO: 27.1 PG (ref 26.8–34.3)
MCHC RBC AUTO-ENTMCNC: 30.4 G/DL (ref 31.4–37.4)
MCHC RBC AUTO-ENTMCNC: 30.4 G/DL (ref 31.4–37.4)
MCV RBC AUTO: 89 FL (ref 82–98)
MCV RBC AUTO: 89 FL (ref 82–98)
NRBC BLD AUTO-RTO: 0 /100 WBCS
NRBC BLD AUTO-RTO: 0 /100 WBCS
PLATELET # BLD AUTO: 215 THOUSANDS/UL (ref 149–390)
PLATELET # BLD AUTO: 215 THOUSANDS/UL (ref 149–390)
PMV BLD AUTO: 11.2 FL (ref 8.9–12.7)
PMV BLD AUTO: 11.2 FL (ref 8.9–12.7)
POTASSIUM SERPL-SCNC: 4.4 MMOL/L (ref 3.5–5.3)
POTASSIUM SERPL-SCNC: 4.4 MMOL/L (ref 3.5–5.3)
PROT SERPL-MCNC: 6.7 G/DL (ref 6.4–8.4)
PROT SERPL-MCNC: 6.7 G/DL (ref 6.4–8.4)
RBC # BLD AUTO: 3.5 MILLION/UL (ref 3.81–5.12)
RBC # BLD AUTO: 3.5 MILLION/UL (ref 3.81–5.12)
SODIUM SERPL-SCNC: 129 MMOL/L (ref 135–147)
SODIUM SERPL-SCNC: 129 MMOL/L (ref 135–147)
WBC # BLD AUTO: 13.46 THOUSAND/UL (ref 4.31–10.16)
WBC # BLD AUTO: 13.46 THOUSAND/UL (ref 4.31–10.16)

## 2023-12-20 PROCEDURE — 99232 SBSQ HOSP IP/OBS MODERATE 35: CPT | Performed by: FAMILY MEDICINE

## 2023-12-20 PROCEDURE — 80053 COMPREHEN METABOLIC PANEL: CPT | Performed by: FAMILY MEDICINE

## 2023-12-20 PROCEDURE — 99233 SBSQ HOSP IP/OBS HIGH 50: CPT | Performed by: INTERNAL MEDICINE

## 2023-12-20 PROCEDURE — 85027 COMPLETE CBC AUTOMATED: CPT | Performed by: FAMILY MEDICINE

## 2023-12-20 RX ORDER — FUROSEMIDE 10 MG/ML
20 INJECTION INTRAMUSCULAR; INTRAVENOUS ONCE
Status: DISCONTINUED | OUTPATIENT
Start: 2023-12-20 | End: 2023-12-21 | Stop reason: HOSPADM

## 2023-12-20 RX ORDER — FUROSEMIDE 10 MG/ML
20 INJECTION INTRAMUSCULAR; INTRAVENOUS ONCE
Status: COMPLETED | OUTPATIENT
Start: 2023-12-20 | End: 2023-12-20

## 2023-12-20 RX ADMIN — CEPHALEXIN 500 MG: 500 CAPSULE ORAL at 05:31

## 2023-12-20 RX ADMIN — Medication 2 G: at 08:45

## 2023-12-20 RX ADMIN — ACETAMINOPHEN 975 MG: 325 TABLET, FILM COATED ORAL at 05:33

## 2023-12-20 RX ADMIN — APIXABAN 5 MG: 5 TABLET, FILM COATED ORAL at 08:45

## 2023-12-20 RX ADMIN — AMIODARONE HYDROCHLORIDE 200 MG: 200 TABLET ORAL at 08:45

## 2023-12-20 RX ADMIN — OXYCODONE HYDROCHLORIDE 5 MG: 5 TABLET ORAL at 05:31

## 2023-12-20 RX ADMIN — CEPHALEXIN 500 MG: 500 CAPSULE ORAL at 23:18

## 2023-12-20 RX ADMIN — OXYCODONE HYDROCHLORIDE 10 MG: 10 TABLET ORAL at 08:44

## 2023-12-20 RX ADMIN — Medication 1 APPLICATION: at 08:46

## 2023-12-20 RX ADMIN — APIXABAN 5 MG: 5 TABLET, FILM COATED ORAL at 17:53

## 2023-12-20 RX ADMIN — CEPHALEXIN 500 MG: 500 CAPSULE ORAL at 17:53

## 2023-12-20 RX ADMIN — CEPHALEXIN 500 MG: 500 CAPSULE ORAL at 13:30

## 2023-12-20 RX ADMIN — LATANOPROST 1 DROP: 50 SOLUTION/ DROPS OPHTHALMIC at 23:15

## 2023-12-20 RX ADMIN — Medication 3 MG: at 23:15

## 2023-12-20 RX ADMIN — ATORVASTATIN CALCIUM 10 MG: 10 TABLET, FILM COATED ORAL at 08:45

## 2023-12-20 RX ADMIN — LIDOCAINE 5% 1 PATCH: 700 PATCH TOPICAL at 08:45

## 2023-12-20 RX ADMIN — FLUTICASONE FUROATE AND VILANTEROL TRIFENATATE 1 PUFF: 100; 25 POWDER RESPIRATORY (INHALATION) at 08:45

## 2023-12-20 RX ADMIN — ACETAMINOPHEN 975 MG: 325 TABLET, FILM COATED ORAL at 23:15

## 2023-12-20 RX ADMIN — Medication 2 G: at 17:56

## 2023-12-20 RX ADMIN — HYDROMORPHONE HYDROCHLORIDE 0.2 MG: 0.2 INJECTION, SOLUTION INTRAMUSCULAR; INTRAVENOUS; SUBCUTANEOUS at 20:54

## 2023-12-20 RX ADMIN — FUROSEMIDE 20 MG: 10 INJECTION, SOLUTION INTRAMUSCULAR; INTRAVENOUS at 20:19

## 2023-12-20 RX ADMIN — ACETAMINOPHEN 975 MG: 325 TABLET, FILM COATED ORAL at 13:30

## 2023-12-20 RX ADMIN — LEVOTHYROXINE SODIUM 25 MCG: 25 TABLET ORAL at 05:31

## 2023-12-20 RX ADMIN — Medication 2 G: at 23:14

## 2023-12-20 RX ADMIN — FERROUS SULFATE TAB 325 MG (65 MG ELEMENTAL FE) 325 MG: 325 (65 FE) TAB at 20:54

## 2023-12-20 RX ADMIN — DULOXETINE HYDROCHLORIDE 30 MG: 30 CAPSULE, DELAYED RELEASE ORAL at 08:45

## 2023-12-20 RX ADMIN — Medication 2 G: at 13:30

## 2023-12-20 RX ADMIN — OXYCODONE HYDROCHLORIDE 10 MG: 10 TABLET ORAL at 20:16

## 2023-12-20 NOTE — PLAN OF CARE
Problem: Prexisting or High Potential for Compromised Skin Integrity  Goal: Skin integrity is maintained or improved  Description: INTERVENTIONS:  - Identify patients at risk for skin breakdown  - Assess and monitor skin integrity  - Assess and monitor nutrition and hydration status  - Monitor labs   - Assess for incontinence   - Turn and reposition patient  - Assist with mobility/ambulation  - Relieve pressure over bony prominences  - Avoid friction and shearing  - Provide appropriate hygiene as needed including keeping skin clean and dry  - Evaluate need for skin moisturizer/barrier cream  - Collaborate with interdisciplinary team   - Patient/family teaching  - Consider wound care consult   Outcome: Progressing

## 2023-12-20 NOTE — PROGRESS NOTES
[facility]  Progress Note  Name: Lucero Booth I  MRN: [id number]  Unit/Bed#: [facility] 734-01 I Date of Admission: 12/14/2023   Date of Service: 12/20/2023 I Hospital Day: 6    Assessment/Plan   * Sepsis (HCC)  Assessment & Plan  Evidenced by tachypnea and leukocytosis  Blood cultures with beta-hemolytic Streptococcus group G  Possible source of right upper extremity cellulitis  UA/flu/RSV/COVID unremarkable  CT CAP no other acute findings of infection aside from likely chronic osteomyelitis underlying sacral decubitus ulcer  Continue Keflex until December 25, 2023 for total of 10 days  ID following, appreciate recommendations  WBC continues to trend down    Atrial fibrillation (HCC)  Assessment & Plan  Patient is rate controlled on metoprolol and anticoagulated on Eliquis  Patient's heart rate is a little bradycardic will increase the holding parameter for metoprolol to 65    ABRAHAM (nonalcoholic steatohepatitis)  Assessment & Plan  Imaging suggestive of cirrhosis likely ABRAHAM per GI  Liver enzymes normal  Outpatient follow-up with GI recommended    Anemia  Assessment & Plan  Today's hemoglobin is 8.6  Monitor closely  No signs of bleeding thus far  Will transfuse below 7    Acute respiratory failure (HCC)  Assessment & Plan  Patient was able to be titrated down to room air however now requires 2 L  Will give patient another dose of 20 mg Lasix IV  Will closely monitor and supplement if oxygen saturation drops below 89  Likely secondary to bilateral pleural effusion noted on CT scan      Cellulitis of right upper extremity  Assessment & Plan  Right upper extremity with erythema in the antecubital region  Cellulitis versus possible allergy from cefepime administration yesterday given patient's history of cephalosporin  Transition to Keflex to complete total 10 days of antibiotic therapy through 12/25  Monitor closely    Bacteremia  Assessment & Plan  Streptococcal bacteremia,  possible right upper extremity cellulitis as source.  TTE (limited) negative for endocarditis  CT CAP unremarkable for any source of infection  Transition to Keflex to complete total 10 days of antibiotics through 12/25  ID following, appreciate recommendations  Repeat blood cultures negative     Elevated troponin  Assessment & Plan  Patient with elevated troponin on first determination however became within normal limits on second determination.  Most likely could be a consequence of infection.    Acquired hypothyroidism  Assessment & Plan  Continue levothyroxine 25 mcg.    Primary insomnia  Assessment & Plan  At home patient takes melatonin, Ativan and Xanax  Holding Ativan and Xanax  Continue melatonin    Major depressive disorder, recurrent, in remission, unspecified (HCC)  Assessment & Plan  Patient on haloperidol which is currently on hold.  Continue Cymbalta    Primary hypertension  Assessment & Plan  Blood pressure is soft  Hold amlodipine 5 mg daily  Continue lisinopril 10 mg daily  Continue Toprol 100    Ambulatory dysfunction  Assessment & Plan  Physical and Occupational Therapy consult, case management.  Recommended level 2    Degenerative disc disease, thoracic  Assessment & Plan  Physical and Occupational Therapy consults with case management.    Polypharmacy  Assessment & Plan  Patient is on multiple medications including pain medications, sedatives, antidepressants.    Patient's daughter is in the room and has been advised regarding discontinuation of these medications so that we can fully assess improvement of her mental status.  Resume as able    Stage IV decubitus ulcer (HCC)  Assessment & Plan  Wound care management  Surgery consulted, no acute intervention recommended  CT showing signs of underlying osteomyelitis though chronicity is uncertain    Acute metabolic encephalopathy  Assessment & Plan  Initially presenting with strokelike symptoms with right-sided facial droop and generalized weakness  with slurred speech.    CT head and CTA head and neck unremarkable for any acute stroke  MRI deferred by neurology  Neurology suspects metabolic encephalopathy in setting of sepsis  Holding home Haldol and Xanax at this time given encephalopathy  See additional management per sepsis A/P  Significantly improved, patient is back to baseline    Stroke-like symptoms  Assessment & Plan  Presented with right facial droop and slurred speech  CT head without any acute abnormality  CTA head and neck showed no large vessel occlusion or acute thrombus but it was a difficult study  Neurology consulted for suspected metabolic encephalopathy in the setting of sepsis  Stroke pathway discontinued  MRI deferred               VTE Pharmacologic Prophylaxis:   Moderate Risk (Score 3-4) - Pharmacological DVT Prophylaxis Ordered: apixaban (Eliquis).    Mobility:   Basic Mobility Inpatient Raw Score: 6  -HLM Goal: 2: Bed activities/Dependent transfer  -HLM Achieved: 1: Laying in bed  HLM Goal NOT achieved. Continue with multidisciplinary rounding and encourage appropriate mobility to improve upon HLM goals.    Patient Centered Rounds: I performed bedside rounds with nursing staff today.   Discussions with Specialists or Other Care Team Provider: 2 infectious disease regarding antibiotic treatment for patient's sepsis    Education and Discussions with Family / Patient: Patient declined call to .     Total Time Spent on Date of Encounter in care of patient: 35 mins. This time was spent on one or more of the following: performing physical exam; counseling and coordination of care; obtaining or reviewing history; documenting in the medical record; reviewing/ordering tests, medications or procedures; communicating with other healthcare professionals and discussing with patient's family/caregivers.    Current Length of Stay: 6 day(s)  Current Patient Status: Inpatient   Certification Statement: The patient will continue to  require additional inpatient hospital stay due to sepsis  Discharge Plan: Anticipate discharge in 24-48 hrs to rehab facility.    Code Status: Level 2 - DNAR: but accepts endotracheal intubation    Subjective:   Is a very pleasant 88-year-old female who was seen and evaluated.  Bedside.  Patient has no acute complaints at this time.    Objective:     Vitals:   Temp (24hrs), Av.7 °F (36.5 °C), Min:97.4 °F (36.3 °C), Max:98.1 °F (36.7 °C)    Temp:  [97.4 °F (36.3 °C)-98.1 °F (36.7 °C)] 98.1 °F (36.7 °C)  HR:  [50-63] 51  Resp:  [16] 16  BP: ()/(47-50) 109/48  SpO2:  [90 %-99 %] 98 %  Body mass index is 39.87 kg/m².     Input and Output Summary (last 24 hours):     Intake/Output Summary (Last 24 hours) at 2023 1649  Last data filed at 2023 1519  Gross per 24 hour   Intake 120 ml   Output 1500 ml   Net -1380 ml       Physical Exam:   Physical Exam  Vitals reviewed.   Constitutional:       General: She is not in acute distress.     Appearance: Normal appearance. She is not ill-appearing.   HENT:      Head: Normocephalic and atraumatic.      Right Ear: External ear normal.      Left Ear: External ear normal.      Nose: Nose normal.   Eyes:      Extraocular Movements: Extraocular movements intact.      Conjunctiva/sclera: Conjunctivae normal.   Cardiovascular:      Rate and Rhythm: Normal rate and regular rhythm.      Pulses: Normal pulses.      Heart sounds: Normal heart sounds.   Pulmonary:      Effort: Pulmonary effort is normal.      Breath sounds: Normal breath sounds.   Abdominal:      General: Abdomen is flat. Bowel sounds are normal.      Palpations: Abdomen is soft.   Musculoskeletal:         General: No deformity or signs of injury.      Cervical back: Normal range of motion.   Skin:     General: Skin is warm and dry.   Neurological:      General: No focal deficit present.      Mental Status: She is alert. Mental status is at baseline.   Psychiatric:         Mood and Affect: Mood normal.          Behavior: Behavior normal.          Additional Data:     Labs:  Results from last 7 days   Lab Units 12/20/23  0625 12/19/23  0522   WBC Thousand/uL 13.46* 16.84*   HEMOGLOBIN g/dL 9.5* 8.6*   HEMATOCRIT % 31.2* 27.7*   PLATELETS Thousands/uL 215 189   BANDS PCT %  --  8   LYMPHO PCT %  --  3*   MONO PCT %  --  3*   EOS PCT %  --  1     Results from last 7 days   Lab Units 12/20/23  0625   SODIUM mmol/L 129*   POTASSIUM mmol/L 4.4   CHLORIDE mmol/L 101   CO2 mmol/L 23   BUN mg/dL 37*   CREATININE mg/dL 0.84   ANION GAP mmol/L 5   CALCIUM mg/dL 7.5*   ALBUMIN g/dL 1.9*   TOTAL BILIRUBIN mg/dL 0.45   ALK PHOS U/L 100   ALT U/L <3*   AST U/L 19   GLUCOSE RANDOM mg/dL 85     Results from last 7 days   Lab Units 12/15/23  0447   INR  2.09*     Results from last 7 days   Lab Units 12/14/23  1708   POC GLUCOSE mg/dl 182*         Results from last 7 days   Lab Units 12/14/23 2016   LACTIC ACID mmol/L 1.6       Lines/Drains:  Invasive Devices       Peripheral Intravenous Line  Duration             Peripheral IV 12/18/23 Dorsal (posterior);Right Hand 2 days              Drain  Duration             External Urinary Catheter 2 days                          Imaging: No pertinent imaging reviewed.    Recent Cultures (last 7 days):   Results from last 7 days   Lab Units 12/16/23  0638 12/14/23 2016   BLOOD CULTURE  No Growth After 4 Days.  No Growth After 4 Days. Beta Hemolytic Streptococcus Group G*  Beta Hemolytic Streptococcus Group G*   GRAM STAIN RESULT   --  Gram positive cocci in pairs and chains*  Gram positive cocci in pairs and chains*       Last 24 Hours Medication List:   Current Facility-Administered Medications   Medication Dose Route Frequency Provider Last Rate    acetaminophen  975 mg Oral Q8H REBECCA Kramer DO      aluminum-magnesium hydroxide-simethicone  30 mL Oral Q6H PRN Aurelio Kelly MD      amiodarone  200 mg Oral Daily Aurelio Kelly MD      ammonium lactate  1 Application  Topical Daily Aurelio Sukhdev Kelly MD      apixaban  5 mg Oral BID Harnishkdee Kramer, DO      atorvastatin  10 mg Oral Daily Aurelioclem Kelly MD      bisacodyl  10 mg Rectal Daily PRN Harnishkumar Kramer, DO      cephalexin  500 mg Oral Q6H [facility] Darcy Rushing MD      Diclofenac Sodium  2 g Topical 4x Daily Aurelio Kelly MD      DULoxetine  30 mg Oral Daily Edenilson Kramer, DO      ferrous sulfate  325 mg Oral Q48H Aurelio Kelly MD      Fluticasone Furoate-Vilanterol  1 puff Inhalation Daily Aurelio Kelly MD      furosemide  20 mg Intravenous Once Lc Wilburn MD      HYDROmorphone  0.2 mg Intravenous Q3H PRN Edenilson Kramer, DO      hyoscyamine  0.125 mg Oral Q6H PRN Aurelio Kelly MD      latanoprost  1 drop Both Eyes HS Aurelio Kelly MD      levothyroxine  25 mcg Oral Early Morning Aurelio Kelly MD      lidocaine  1 patch Topical Daily Aurelio Kelly MD      lisinopril  10 mg Oral Daily Aurelio Kelly MD      melatonin  3 mg Oral HS Edenilson Kramer, DO      metoprolol succinate  100 mg Oral Daily Lc Wilburn MD      ondansetron  4 mg Intravenous Q6H PRN Aurelio Kelly MD      oxyCODONE  10 mg Oral Q6H PRN Edenilson Kramer, DO      oxyCODONE  5 mg Oral Q6H PRN Edenilson Kramer, DO      polyethylene glycol  17 g Oral Daily PRN Edenilson Kramer, DO      pseudoephedrine  60 mg Oral Q6H PRN Aurelio Kelly MD      senna-docusate sodium  1 tablet Oral HS Edenilson Kramer, DO          Today, Patient Was Seen By: Lc Wilburn MD    **Please Note: This note may have been constructed using a voice recognition system.**

## 2023-12-20 NOTE — PROGRESS NOTES
Progress Note - Infectious Disease   Lucero Booth 88 y.o. female MRN: [id number]  Unit/Bed#: [facility] 734-01 Encounter: [id number]      Impression/Recommendations:  Sepsis, POA.    WBC, RR.  Due to bacteremia.  No other appreciable source.  UA, flu/RSV/COVID PCR, CT C/A/P otherwise negative.  Improving.  Rec:  Continue antibiotics as below  Follow temperatures and WBC closely  Supportive care as per the primary service     Group G Strep bacteremia.    Consider due to RUE cellulitis versus other skin source given multiple wounds as below.  Doubt relation to recent pseudoaneurysm given normal exam and imaging.  No indwelling intravascular devices.  Repeat blood cultures 12/16 negative.  TTE (technically difficult) negative.  Rec:  Continue Keflex through 12/25 to complete 10 days total antibiotics     RUE cellulitis.    Unclear etiology.  Unusual location and distribution.  Improving.  Rec:  Continue antibiotics as above  Serial exams     Left upper arm wound.  Possibly pressure wound due to overall appearance.  No apparent superinfection.  Rec:  LWC per nursing     Chronic sacral decubitus ulcer.  No apparent acute superinfection on photo review.  Consider chronic osteomyelitis given CT findings.  Rec:  LWC per nursing     Acute encephalopathy.  Suspect toxic metabolic due to sepsis as above.  CT head, CTA negative.  Improved.     Recent L SFA pseudoaneurysm.  Thought iatrogenic from recent Micra pacer placement at OSH.  Status post thrombin injection 11/3/23 with resolution on repeat Doppler.  No noted abnormality on CT A/P this admission although non-contrast and exam benign.     Cirrhosis.  Thought due to ABRAHAM.  T. Bili slightly elevated which may be due to sepsis as above, now improved.  CT A/P unremarkable.     Status post spinal fusion.  Chronic back pain which patient reports is stable.     Cephalosporin allergy.  Unknown reaction.  Received cefepime in ED without issue.  Tolerating cefazolin.     The patient  is stable from an ID standpoint for D/C to SNF.     Antibiotics:  Keflex #2  Antibiotics #5    Subjective/24 Hour Events:  Patient seen on AM rounds.  More awake.  Offers no complaints.    Objective:  Vitals:  Temp:  [97.4 °F (36.3 °C)-97.7 °F (36.5 °C)] 97.7 °F (36.5 °C)  HR:  [50-63] 63  Resp:  [16] 16  BP: ()/(47-60) 107/47  SpO2:  [94 %-97 %] 96 %  Temp (24hrs), Av.5 °F (36.4 °C), Min:97.4 °F (36.3 °C), Max:97.7 °F (36.5 °C)  Current: Temperature: 97.7 °F (36.5 °C)    Physical Exam:   General:  No acute distress  Psychiatric:  Awake and alert  Pulmonary:  Normal respiratory excursion without accessory muscle use  Abdomen:  Soft, nontender  Extremities:  Improved cellulitis RUE, minimal residual dependent ecchymosis  Skin:  No rashes    Lab Results:  I have personally reviewed pertinent labs.  Results from last 7 days   Lab Units 23  0541 23  0542 23  0638 12/15/23  0447   POTASSIUM mmol/L 4.4 4.4 3.9   < > 3.8 3.4*   CHLORIDE mmol/L 101 102 102   < > 105 100   CO2 mmol/L 23 23 24   < > 23 21   BUN mg/dL 37* 40* 34*   < > 36* 34*   CREATININE mg/dL 0.84 0.82 0.80   < > 0.97 0.96   EGFR ml/min/1.73sq m 63 65 66   < > 53 53   CALCIUM mg/dL 7.5* 7.1* 7.3*   < > 7.6* 7.7*   AST U/L 19  --   --   --  42* 28   ALT U/L <3*  --   --   --  13 12   ALK PHOS U/L 100  --   --   --  135* 127*    < > = values in this interval not displayed.     Results from last 7 days   Lab Units 23  0523  0541   WBC Thousand/uL 13.46* 16.84* 17.87*   HEMOGLOBIN g/dL 9.5* 8.6* 9.5*   PLATELETS Thousands/uL 215 189 182     Results from last 7 days   Lab Units 23  0638 12/15/23  0456 23   BLOOD CULTURE  No Growth After 4 Days.  No Growth After 4 Days.  --  Beta Hemolytic Streptococcus Group G*  Beta Hemolytic Streptococcus Group G*   GRAM STAIN RESULT   --   --  Gram positive cocci in pairs and chains*  Gram positive cocci in pairs and  chains*   MRSA CULTURE ONLY   --  Methicillin Resistant Staphylococcus aureus isolated*  Please note: This patient requires contact precautions.  --        Imaging Studies:   I have personally reviewed pertinent imaging study reports and images in PACS.    EKG, Pathology, and Other Studies:   I have personally reviewed pertinent reports.

## 2023-12-20 NOTE — ASSESSMENT & PLAN NOTE
Patient was able to be titrated down to room air however now requires 2 L  Will give patient another dose of 20 mg Lasix IV  Will closely monitor and supplement if oxygen saturation drops below 89  Likely secondary to bilateral pleural effusion noted on CT scan

## 2023-12-20 NOTE — ASSESSMENT & PLAN NOTE
Right upper extremity with erythema in the antecubital region  Cellulitis versus possible allergy from cefepime administration yesterday given patient's history of cephalosporin  Transition to Keflex to complete total 10 days of antibiotic therapy through 12/25  Monitor closely

## 2023-12-20 NOTE — ASSESSMENT & PLAN NOTE
Evidenced by tachypnea and leukocytosis  Blood cultures with beta-hemolytic Streptococcus group G  Possible source of right upper extremity cellulitis  UA/flu/RSV/COVID unremarkable  CT CAP no other acute findings of infection aside from likely chronic osteomyelitis underlying sacral decubitus ulcer  Continue Keflex until December 25, 2023 for total of 10 days  ID following, appreciate recommendations  WBC continues to trend down

## 2023-12-20 NOTE — CASE MANAGEMENT
Case Management Discharge Planning Note    Patient name Lucero Booth  Location [facility] 734/[facility] 734-01 MRN [id number]  : 1935 Date 2023       Current Admission Date: 2023  Current Admission Diagnosis:Sepsis (HCC)   Patient Active Problem List    Diagnosis Date Noted    Anemia 2023    ABRAHAM (nonalcoholic steatohepatitis) 2023    Atrial fibrillation (HCC) 2023    Sepsis (HCC) 12/15/2023    Bacteremia 12/15/2023    Cellulitis of right upper extremity 12/15/2023    Acute respiratory failure (HCC) 12/15/2023    Stroke-like symptoms 2023    Acute metabolic encephalopathy 2023    Stage IV decubitus ulcer (HCC) 2023    Polypharmacy 2023    Acute kidney insufficiency 2023    Degenerative disc disease, thoracic 2023    Ambulatory dysfunction 2023    Primary hypertension 2023    Major depressive disorder, recurrent, in remission, unspecified (HCC) 2023    Primary insomnia 2023    Acquired hypothyroidism 2023    Elevated troponin 2023      LOS (days): 6  Geometric Mean LOS (GMLOS) (days): 5.1  Days to GMLOS:-0.5     OBJECTIVE:  Risk of Unplanned Readmission Score: 25.52         Current admission status: Inpatient   Preferred Pharmacy:   [facility] - [place], [place] - [address]  [address]  [place] [address]  Phone: [phone] Fax: [phone]    Primary Care Provider: Joe Briggs    [facility]  Secondary Insurance:     DISCHARGE DETAILS:    1155:   Resident left  for Pati ([phone]) from [facility] to confirm patient can return.  CM Resident requested call back and will remain available.    1315:  Pati from [facility] returned call to CM Resident.  Pati stated they have multiple patients hospitalized right now and that she needs to review any notes that they still need for the patient to return.  Pati stated that due to  patients higher level of care, patient can return to [facility].  Per Pati, she will call this CM Resident back to provide time for patient to return, along with any clinical notes that are still needed.  CM Resident will remain available to schedule transport for patient tomorrow.

## 2023-12-20 NOTE — ASSESSMENT & PLAN NOTE
Chief Complaint   Patient presents with    Physical Exam     would like to discuss weaning Citalopram due to feeling it is ineffective        Patient ID: Kerrie Phoenix is a 50 y o  male  HPI  Pt is seeing for CPE     The following portions of the patient's history were reviewed and updated as appropriate: allergies, current medications, past family history, past medical history, past social history, past surgical history and problem list     Review of Systems   Constitutional: Positive for fatigue  Negative for fever and unexpected weight change  HENT: Negative for congestion, ear discharge, ear pain, hearing loss, rhinorrhea, sinus pressure, sore throat and trouble swallowing  Eyes: Negative  Respiratory: Positive for shortness of breath (with exertion -  was Dx with COVID infection 2 wks ago -  recovering )  Negative for apnea, cough, chest tightness, wheezing and stridor  Cardiovascular: Negative  Gastrointestinal: Negative  Endocrine: Negative  Genitourinary: Negative  Musculoskeletal: Negative  Skin: Negative  Neurological: Negative for dizziness, weakness, light-headedness and numbness  Hematological: Negative  Psychiatric/Behavioral: Positive for dysphoric mood  Negative for agitation, behavioral problems, confusion, decreased concentration, hallucinations, self-injury, sleep disturbance and suicidal ideas  The patient is nervous/anxious  The patient is not hyperactive          Current Outpatient Medications   Medication Sig Dispense Refill    ALPRAZolam (XANAX) 0 25 mg tablet take 1 tablet by mouth daily if needed at bedtime for anxiety 30 tablet 0    citalopram (CeleXA) 10 mg tablet Take 1 tablet (10 mg total) by mouth daily 30 tablet 5    fluticasone (FLONASE) 50 mcg/act nasal spray 2 sprays into each nostril daily 16 g 6    montelukast (SINGULAIR) 10 mg tablet take 1 tablet by mouth at bedtime (Patient not taking: Reported on 5/7/2021) 30 tablet 6     No current Imaging suggestive of cirrhosis likely ABRAHAM per GI  Liver enzymes normal  Outpatient follow-up with GI recommended   facility-administered medications for this visit  Objective:    /80   Pulse 78   Temp 98 2 °F (36 8 °C) (Tympanic)   Resp 16   Ht 5' 10" (1 778 m)   Wt 84 4 kg (186 lb)   BMI 26 69 kg/m²        Physical Exam  Constitutional:       General: He is not in acute distress  Appearance: He is well-developed  He is not ill-appearing  HENT:      Head: Normocephalic and atraumatic  Right Ear: Hearing, tympanic membrane, ear canal and external ear normal       Left Ear: Hearing, tympanic membrane, ear canal and external ear normal       Nose: No congestion or rhinorrhea  Mouth/Throat:      Pharynx: No oropharyngeal exudate or posterior oropharyngeal erythema  Eyes:      Extraocular Movements: Extraocular movements intact  Conjunctiva/sclera: Conjunctivae normal    Neck:      Musculoskeletal: Normal range of motion and neck supple  Thyroid: No thyroid mass or thyromegaly  Vascular: No JVD  Cardiovascular:      Rate and Rhythm: Normal rate and regular rhythm  Heart sounds: Normal heart sounds  No murmur  No gallop  Pulmonary:      Effort: No respiratory distress  Breath sounds: Normal breath sounds  No wheezing, rhonchi or rales  Abdominal:      General: Bowel sounds are normal       Palpations: Abdomen is soft  Tenderness: There is no abdominal tenderness  Musculoskeletal:      Right lower leg: No edema  Left lower leg: No edema  Lymphadenopathy:      Cervical: No cervical adenopathy  Skin:     Coloration: Skin is not pale  Findings: No lesion or rash  Neurological:      General: No focal deficit present  Mental Status: He is alert and oriented to person, place, and time  Cranial Nerves: No cranial nerve deficit  Psychiatric:         Mood and Affect: Mood normal          Behavior: Behavior normal          Thought Content:  Thought content normal          Judgment: Judgment normal            Labs in chart were reviewed  Assessment/Plan:         Diagnoses and all orders for this visit:    Routine medical exam  -     Comprehensive metabolic panel; Future  -     Hemoglobin A1C; Future  -     Lipid panel; Future  -     TSH, 3rd generation; Future    Current mild episode of major depressive disorder without prior episode (HCC)  -     DULoxetine (CYMBALTA) 30 mg delayed release capsule; Take 1 capsule (30 mg total) by mouth daily    Anxiety  -     ALPRAZolam (XANAX) 0 25 mg tablet; Take 1 tablet (0 25 mg total) by mouth daily at bedtime as needed for anxiety        rto in 6 wks for f/u depression     BMI Counseling: Body mass index is 26 69 kg/m²  Discussed the patient's BMI with him  The BMI is above normal  Nutrition recommendations include reducing portion sizes, decreasing overall calorie intake, 3-5 servings of fruits/vegetables daily and reducing fast food intake  Exercise recommendations include exercising 3-5 times per week                   Caio Casarez MD

## 2023-12-20 NOTE — ASSESSMENT & PLAN NOTE
Initially presenting with strokelike symptoms with right-sided facial droop and generalized weakness with slurred speech.    CT head and CTA head and neck unremarkable for any acute stroke  MRI deferred by neurology  Neurology suspects metabolic encephalopathy in setting of sepsis  Holding home Haldol and Xanax at this time given encephalopathy  See additional management per sepsis A/P  Significantly improved, patient is back to baseline

## 2023-12-20 NOTE — ASSESSMENT & PLAN NOTE
Streptococcal bacteremia, possible right upper extremity cellulitis as source.  TTE (limited) negative for endocarditis  CT CAP unremarkable for any source of infection  Transition to Keflex to complete total 10 days of antibiotics through 12/25  ID following, appreciate recommendations  Repeat blood cultures negative

## 2023-12-20 NOTE — ASSESSMENT & PLAN NOTE
Patient is rate controlled on metoprolol and anticoagulated on Eliquis  Patient's heart rate is a little bradycardic will increase the holding parameter for metoprolol to 65

## 2023-12-21 ENCOUNTER — HOSPITAL ENCOUNTER (EMERGENCY)
Facility: HOSPITAL | Age: 88
Discharge: HOME | End: 2023-12-21
Attending: EMERGENCY MEDICINE
Payer: COMMERCIAL

## 2023-12-21 VITALS
SYSTOLIC BLOOD PRESSURE: 145 MMHG | OXYGEN SATURATION: 93 % | TEMPERATURE: 97.8 F | RESPIRATION RATE: 18 BRPM | RESPIRATION RATE: 18 BRPM | OXYGEN SATURATION: 93 % | SYSTOLIC BLOOD PRESSURE: 145 MMHG | HEART RATE: 70 BPM | DIASTOLIC BLOOD PRESSURE: 67 MMHG | HEART RATE: 70 BPM | TEMPERATURE: 97.8 F | DIASTOLIC BLOOD PRESSURE: 67 MMHG

## 2023-12-21 VITALS
WEIGHT: 225.53 LBS | BODY MASS INDEX: 39.96 KG/M2 | TEMPERATURE: 97.4 F | RESPIRATION RATE: 16 BRPM | BODY MASS INDEX: 39.96 KG/M2 | HEIGHT: 63 IN | HEIGHT: 63 IN | DIASTOLIC BLOOD PRESSURE: 51 MMHG | OXYGEN SATURATION: 94 % | SYSTOLIC BLOOD PRESSURE: 114 MMHG | TEMPERATURE: 97.4 F | SYSTOLIC BLOOD PRESSURE: 114 MMHG | RESPIRATION RATE: 16 BRPM | HEART RATE: 72 BPM | WEIGHT: 225.53 LBS | OXYGEN SATURATION: 94 % | HEART RATE: 72 BPM | DIASTOLIC BLOOD PRESSURE: 51 MMHG

## 2023-12-21 DIAGNOSIS — U07.1 COVID: Primary | ICD-10-CM

## 2023-12-21 LAB
ANION GAP SERPL CALCULATED.3IONS-SCNC: 5 MMOL/L
ANION GAP SERPL CALCULATED.3IONS-SCNC: 5 MMOL/L
ATRIAL RATE: 66 BPM
ATRIAL RATE: 66 BPM
BACTERIA BLD CULT: NORMAL
BUN SERPL-MCNC: 32 MG/DL (ref 5–25)
BUN SERPL-MCNC: 32 MG/DL (ref 5–25)
CALCIUM SERPL-MCNC: 7.5 MG/DL (ref 8.4–10.2)
CALCIUM SERPL-MCNC: 7.5 MG/DL (ref 8.4–10.2)
CHLORIDE SERPL-SCNC: 100 MMOL/L (ref 96–108)
CHLORIDE SERPL-SCNC: 100 MMOL/L (ref 96–108)
CO2 SERPL-SCNC: 26 MMOL/L (ref 21–32)
CO2 SERPL-SCNC: 26 MMOL/L (ref 21–32)
CREAT SERPL-MCNC: 0.82 MG/DL (ref 0.6–1.3)
CREAT SERPL-MCNC: 0.82 MG/DL (ref 0.6–1.3)
ERYTHROCYTE [DISTWIDTH] IN BLOOD BY AUTOMATED COUNT: 17.8 % (ref 11.6–15.1)
ERYTHROCYTE [DISTWIDTH] IN BLOOD BY AUTOMATED COUNT: 17.8 % (ref 11.6–15.1)
GFR SERPL CREATININE-BSD FRML MDRD: 65 ML/MIN/1.73SQ M
GFR SERPL CREATININE-BSD FRML MDRD: 65 ML/MIN/1.73SQ M
GLUCOSE SERPL-MCNC: 100 MG/DL (ref 65–140)
GLUCOSE SERPL-MCNC: 100 MG/DL (ref 65–140)
HCT VFR BLD AUTO: 29.9 % (ref 34.8–46.1)
HCT VFR BLD AUTO: 29.9 % (ref 34.8–46.1)
HGB BLD-MCNC: 9.1 G/DL (ref 11.5–15.4)
HGB BLD-MCNC: 9.1 G/DL (ref 11.5–15.4)
MCH RBC QN AUTO: 26.4 PG (ref 26.8–34.3)
MCH RBC QN AUTO: 26.4 PG (ref 26.8–34.3)
MCHC RBC AUTO-ENTMCNC: 30.4 G/DL (ref 31.4–37.4)
MCHC RBC AUTO-ENTMCNC: 30.4 G/DL (ref 31.4–37.4)
MCV RBC AUTO: 87 FL (ref 82–98)
MCV RBC AUTO: 87 FL (ref 82–98)
P AXIS: 21 DEGREES
P AXIS: 21 DEGREES
PLATELET # BLD AUTO: 241 THOUSANDS/UL (ref 149–390)
PLATELET # BLD AUTO: 241 THOUSANDS/UL (ref 149–390)
PMV BLD AUTO: 11 FL (ref 8.9–12.7)
PMV BLD AUTO: 11 FL (ref 8.9–12.7)
POTASSIUM SERPL-SCNC: 4.2 MMOL/L (ref 3.5–5.3)
POTASSIUM SERPL-SCNC: 4.2 MMOL/L (ref 3.5–5.3)
PR INTERVAL: 196 MS
PR INTERVAL: 196 MS
QRS AXIS: -9 DEGREES
QRS AXIS: -9 DEGREES
QRSD INTERVAL: 116 MS
QRSD INTERVAL: 116 MS
QT INTERVAL: 386 MS
QT INTERVAL: 386 MS
QTC INTERVAL: 404 MS
QTC INTERVAL: 404 MS
RBC # BLD AUTO: 3.45 MILLION/UL (ref 3.81–5.12)
RBC # BLD AUTO: 3.45 MILLION/UL (ref 3.81–5.12)
SARS-COV-2 RNA RESP QL NAA+PROBE: POSITIVE
SARS-COV-2 RNA RESP QL NAA+PROBE: POSITIVE
SODIUM SERPL-SCNC: 131 MMOL/L (ref 135–147)
SODIUM SERPL-SCNC: 131 MMOL/L (ref 135–147)
T WAVE AXIS: 39 DEGREES
T WAVE AXIS: 39 DEGREES
VENTRICULAR RATE: 66 BPM
VENTRICULAR RATE: 66 BPM
WBC # BLD AUTO: 14.03 THOUSAND/UL (ref 4.31–10.16)
WBC # BLD AUTO: 14.03 THOUSAND/UL (ref 4.31–10.16)

## 2023-12-21 PROCEDURE — 80048 BASIC METABOLIC PNL TOTAL CA: CPT | Performed by: FAMILY MEDICINE

## 2023-12-21 PROCEDURE — 99239 HOSP IP/OBS DSCHRG MGMT >30: CPT | Performed by: FAMILY MEDICINE

## 2023-12-21 PROCEDURE — 85027 COMPLETE CBC AUTOMATED: CPT | Performed by: FAMILY MEDICINE

## 2023-12-21 PROCEDURE — 99233 SBSQ HOSP IP/OBS HIGH 50: CPT | Performed by: INTERNAL MEDICINE

## 2023-12-21 PROCEDURE — 99284 EMERGENCY DEPT VISIT MOD MDM: CPT

## 2023-12-21 PROCEDURE — 99284 EMERGENCY DEPT VISIT MOD MDM: CPT | Performed by: EMERGENCY MEDICINE

## 2023-12-21 PROCEDURE — 97112 NEUROMUSCULAR REEDUCATION: CPT

## 2023-12-21 PROCEDURE — 97530 THERAPEUTIC ACTIVITIES: CPT

## 2023-12-21 PROCEDURE — 93005 ELECTROCARDIOGRAM TRACING: CPT

## 2023-12-21 PROCEDURE — 87635 SARS-COV-2 COVID-19 AMP PRB: CPT | Performed by: FAMILY MEDICINE

## 2023-12-21 RX ORDER — CEPHALEXIN 500 MG/1
500 CAPSULE ORAL EVERY 6 HOURS SCHEDULED
Qty: 16 CAPSULE | Refills: 0
Start: 2023-12-21 | End: 2023-12-25

## 2023-12-21 RX ADMIN — Medication 1 APPLICATION: at 09:19

## 2023-12-21 RX ADMIN — LISINOPRIL 10 MG: 10 TABLET ORAL at 09:20

## 2023-12-21 RX ADMIN — CEPHALEXIN 500 MG: 500 CAPSULE ORAL at 07:00

## 2023-12-21 RX ADMIN — LEVOTHYROXINE SODIUM 25 MCG: 25 TABLET ORAL at 07:00

## 2023-12-21 RX ADMIN — LIDOCAINE 5% 1 PATCH: 700 PATCH TOPICAL at 09:16

## 2023-12-21 RX ADMIN — Medication 2 G: at 11:58

## 2023-12-21 RX ADMIN — OXYCODONE HYDROCHLORIDE 10 MG: 10 TABLET ORAL at 09:17

## 2023-12-21 RX ADMIN — ATORVASTATIN CALCIUM 10 MG: 10 TABLET, FILM COATED ORAL at 09:16

## 2023-12-21 RX ADMIN — AMIODARONE HYDROCHLORIDE 200 MG: 200 TABLET ORAL at 09:16

## 2023-12-21 RX ADMIN — Medication 2 G: at 09:19

## 2023-12-21 RX ADMIN — ACETAMINOPHEN 975 MG: 325 TABLET, FILM COATED ORAL at 14:51

## 2023-12-21 RX ADMIN — APIXABAN 5 MG: 5 TABLET, FILM COATED ORAL at 09:17

## 2023-12-21 RX ADMIN — CEPHALEXIN 500 MG: 500 CAPSULE ORAL at 12:00

## 2023-12-21 RX ADMIN — METOPROLOL SUCCINATE 100 MG: 100 TABLET, EXTENDED RELEASE ORAL at 09:17

## 2023-12-21 RX ADMIN — DULOXETINE HYDROCHLORIDE 30 MG: 30 CAPSULE, DELAYED RELEASE ORAL at 09:16

## 2023-12-21 RX ADMIN — FLUTICASONE FUROATE AND VILANTEROL TRIFENATATE 1 PUFF: 100; 25 POWDER RESPIRATORY (INHALATION) at 09:19

## 2023-12-21 RX ADMIN — ACETAMINOPHEN 975 MG: 325 TABLET, FILM COATED ORAL at 07:00

## 2023-12-21 NOTE — PROGRESS NOTES
[facility], was redirected to another number. Called that number multiple times and received voicemail. Left voice message with call back number

## 2023-12-21 NOTE — PLAN OF CARE
Problem: PHYSICAL THERAPY ADULT  Goal: Performs mobility at highest level of function for planned discharge setting.  See evaluation for individualized goals.  Description: Treatment/Interventions: LE strengthening/ROM, Therapeutic exercise, Functional transfer training, Endurance training, Cognitive reorientation, Patient/family training, Equipment eval/education, Bed mobility, Gait training, Spoke to nursing, Spoke to case management, OT  Equipment Recommended:  (TBD)       See flowsheet documentation for full assessment, interventions and recommendations.  Outcome: Progressing  Note: Prognosis: Fair  Problem List: Decreased strength, Decreased endurance, Impaired balance, Decreased mobility, Decreased coordination, Decreased cognition, Impaired judgement, Decreased safety awareness, Orthopedic restrictions, Pain  Assessment: The patient was able to progress to standing trials today, but she continues to require significant assistance for all mobility. She fatigued rapidly with the two standing trials, and she required several minutes in order to recover. She does demonstrate improving LE strength today. Will continue to follow in order to safely progress her functional mobility.  Barriers to Discharge: Inaccessible home environment, Decreased caregiver support     Rehab Resource Intensity Level, PT: II (Moderate Resource Intensity)    See flowsheet documentation for full assessment.

## 2023-12-21 NOTE — PLAN OF CARE
Problem: Prexisting or High Potential for Compromised Skin Integrity  Goal: Skin integrity is maintained or improved  Description: INTERVENTIONS:  - Identify patients at risk for skin breakdown  - Assess and monitor skin integrity  - Assess and monitor nutrition and hydration status  - Monitor labs   - Assess for incontinence   - Turn and reposition patient  - Assist with mobility/ambulation  - Relieve pressure over bony prominences  - Avoid friction and shearing  - Provide appropriate hygiene as needed including keeping skin clean and dry  - Evaluate need for skin moisturizer/barrier cream  - Collaborate with interdisciplinary team   - Patient/family teaching  - Consider wound care consult   Outcome: Progressing     Problem: PAIN - ADULT  Goal: Verbalizes/displays adequate comfort level or baseline comfort level  Description: Interventions:  - Encourage patient to monitor pain and request assistance  - Assess pain using appropriate pain scale  - Administer analgesics based on type and severity of pain and evaluate response  - Implement non-pharmacological measures as appropriate and evaluate response  - Notify physician/advanced practitioner if interventions unsuccessful or patient reports new pain  Outcome: Progressing     Problem: INFECTION - ADULT  Goal: Absence or prevention of progression during hospitalization  Description: INTERVENTIONS:  - Assess and monitor for signs and symptoms of infection  - Monitor lab/diagnostic results  - Monitor all insertion sites, i.e. indwelling lines, tubes, and drains  - Dunbar appropriate cooling/warming therapies per order  - Administer medications as ordered  - Instruct and encourage patient and family to use good hand hygiene technique  - Identify and instruct in appropriate isolation precautions for identified infection/condition  Outcome: Progressing     Problem: SAFETY ADULT  Goal: Patient will remain free of falls  Description: INTERVENTIONS:  - Educate  patient/family on patient safety including physical limitations  - Instruct patient to call for assistance with activity   - Consult OT/PT to assist with strengthening/mobility   - Keep Call bell within reach  - Keep bed low and locked with side rails adjusted as appropriate  - Keep care items and personal belongings within reach  - Initiate and maintain comfort rounds  - Make Fall Risk Sign visible to staff  - Offer Toileting every 2 Hours, in advance of need  - Initiate/Maintain bed alarm  - Obtain necessary fall risk management equipment.  - Apply yellow socks and bracelet for high fall risk patients  - Consider moving patient to room near nurses station  Outcome: Progressing  Goal: Maintain or return to baseline ADL function  Description: INTERVENTIONS:  -  Assess patient's ability to carry out ADLs; assess patient's baseline for ADL function and identify physical deficits which impact ability to perform ADLs (bathing, care of mouth/teeth, toileting, grooming, dressing, etc.)  - Assess/evaluate cause of self-care deficits   - Assess range of motion  - Assess patient's mobility; develop plan if impaired  - Assess patient's need for assistive devices and provide as appropriate  - Encourage maximum independence but intervene and supervise when necessary  - Involve family in performance of ADLs  - Assess for home care needs following discharge   - Consider OT consult to assist with ADL evaluation and planning for discharge  - Provide patient education as appropriate  Outcome: Progressing  Goal: Maintains/Returns to pre admission functional level  Description: INTERVENTIONS:  - Perform AM-PAC 6 Click Basic Mobility/ Daily Activity assessment daily.  - Set and communicate daily mobility goal to care team and patient/family/caregiver.   - Collaborate with rehabilitation services on mobility goals if consulted  - Perform Range of Motion 3 times a day.  - Reposition patient every 2 hours.  - Record patient progress and  toleration of activity level   Outcome: Progressing     Problem: DISCHARGE PLANNING  Goal: Discharge to home or other facility with appropriate resources  Description: INTERVENTIONS:  - Identify barriers to discharge w/patient and caregiver  - Arrange for needed discharge resources and transportation as appropriate  - Identify discharge learning needs (meds, wound care, etc.)  - Refer to Case Management Department for coordinating discharge planning if the patient needs post-hospital services based on physician/advanced practitioner order or complex needs related to functional status, cognitive ability, or social support system  Outcome: Progressing     Problem: Knowledge Deficit  Goal: Patient/family/caregiver demonstrates understanding of disease process, treatment plan, medications, and discharge instructions  Description: Complete learning assessment and assess knowledge base.  Interventions:  - Provide teaching at level of understanding  - Provide teaching via preferred learning methods  Outcome: Progressing     Problem: NEUROSENSORY - ADULT  Goal: Achieves stable or improved neurological status  Description: INTERVENTIONS  - Monitor and report changes in neurological status  - Monitor vital signs such as temperature, blood pressure, glucose, and any other labs ordered   - Initiate measures to prevent increased intracranial pressure  - Monitor for seizure activity and implement precautions if appropriate      Outcome: Progressing  Goal: Remains free of injury related to seizures activity  Description: INTERVENTIONS  - Maintain airway, patient safety  and administer oxygen as ordered  - Monitor patient for seizure activity, document and report duration and description of seizure to physician/advanced practitioner  - If seizure occurs,  ensure patient safety during seizure  - Reorient patient post seizure  - Seizure pads on all 4 side rails  - Instruct patient/family to notify RN of any seizure activity including  if an aura is experienced  - Instruct patient/family to call for assistance with activity based on nursing assessment  - Administer anti-seizure medications if ordered    Outcome: Progressing  Goal: Achieves maximal functionality and self care  Description: INTERVENTIONS  - Monitor swallowing and airway patency with patient fatigue and changes in neurological status  - Encourage and assist patient to increase activity and self care.   - Encourage visually impaired, hearing impaired and aphasic patients to use assistive/communication devices  Outcome: Progressing     Problem: RESPIRATORY - ADULT  Goal: Achieves optimal ventilation and oxygenation  Description: INTERVENTIONS:  - Assess for changes in respiratory status  - Assess for changes in mentation and behavior  - Position to facilitate oxygenation and minimize respiratory effort  - Oxygen administered by appropriate delivery if ordered  - Initiate smoking cessation education as indicated  - Encourage broncho-pulmonary hygiene including cough, deep breathe, Incentive Spirometry  - Assess the need for suctioning and aspirate as needed  - Assess and instruct to report SOB or any respiratory difficulty  - Respiratory Therapy support as indicated  Outcome: Progressing     Problem: SKIN/TISSUE INTEGRITY - ADULT  Goal: Skin Integrity remains intact(Skin Breakdown Prevention)  Description: Assess:  -Perform Marcellus assessment every shift  -Clean and moisturize skin every 2 hours  -Inspect skin when repositioning, toileting, and assisting with ADLS  -Assess extremities for adequate circulation and sensation     Bed Management:  -Have minimal linens on bed & keep smooth, unwrinkled  -Change linens as needed when moist or perspiring  -Avoid sitting or lying in one position for more than 2 hours while in bed  -Keep HOB at 30 degrees     Toileting:  -Offer bedside commode  -Assess for incontinence every 2 hours  -Use incontinent care products after each incontinent episode  such as barrier cream    Activity:  -Encourage or provide ROM exercises   -Turn and reposition patient every 2 Hours  -Use appropriate equipment to lift or move patient in bed    Skin Care:  -Avoid use of baby powder, tape, friction and shearing, hot water or constrictive clothing  -Relieve pressure over bony prominences using allevyn  -Do not massage red bony areas  Outcome: Progressing  Goal: Incision(s), wounds(s) or drain site(s) healing without S/S of infection  Description: INTERVENTIONS  - Assess and document dressing, incision, wound bed, drain sites and surrounding tissue  - Provide patient and family education  - Perform skin care/dressing changes daily  Outcome: Progressing  Goal: Pressure injury heals and does not worsen  Description: Interventions:  - Implement low air loss mattress or specialty surface (Criteria met)  - Apply silicone foam dressing  - Perform passive or active ROM 4 times per day  - Turn and reposition patient & offload bony prominences every 2 hours   - Utilize friction reducing device or surface for transfers   - Consider consults to  interdisciplinary teams such as wound care  - Consider nutrition services referral as needed  Outcome: Progressing     Problem: MUSCULOSKELETAL - ADULT  Goal: Maintain or return mobility to safest level of function  Description: INTERVENTIONS:  - Assess patient's ability to carry out ADLs; assess patient's baseline for ADL function and identify physical deficits which impact ability to perform ADLs (bathing, care of mouth/teeth, toileting, grooming, dressing, etc.)  - Assess/evaluate cause of self-care deficits   - Assess range of motion  - Assess patient's mobility  - Assess patient's need for assistive devices and provide as appropriate  - Encourage maximum independence but intervene and supervise when necessary  - Involve family in performance of ADLs  - Assess for home care needs following discharge   - Consider OT consult to assist with ADL  evaluation and planning for discharge  - Provide patient education as appropriate  Outcome: Progressing  Goal: Maintain proper alignment of affected body part  Description: INTERVENTIONS:  - Support, maintain and protect limb and body alignment  - Provide patient/ family with appropriate education  Outcome: Progressing     Problem: Nutrition/Hydration-ADULT  Goal: Nutrient/Hydration intake appropriate for improving, restoring or maintaining nutritional needs  Description: Monitor and assess patient's nutrition/hydration status for malnutrition. Collaborate with interdisciplinary team and initiate plan and interventions as ordered.  Monitor patient's weight and dietary intake as ordered or per policy. Utilize nutrition screening tool and intervene as necessary. Determine patient's food preferences and provide high-protein, high-caloric foods as appropriate.     INTERVENTIONS:  - Monitor oral intake, urinary output, labs, and treatment plans  - Assess nutrition and hydration status and recommend course of action  - Evaluate amount of meals eaten  - Assist patient with eating if necessary   - Allow adequate time for meals  - Recommend/ encourage appropriate diets, oral nutritional supplements, and vitamin/mineral supplements  - Order, calculate, and assess calorie counts as needed  - Assess need for intravenous fluids  - Provide specific nutrition/hydration education as appropriate  - Include patient/family/caregiver in decisions related to nutrition  Outcome: Progressing

## 2023-12-21 NOTE — ASSESSMENT & PLAN NOTE
Patient was COVID screening prior to returning back to facility.  No indication to start antiviral therapy at this time

## 2023-12-21 NOTE — PHYSICAL THERAPY NOTE
Physical Therapy Progress Note       12/21/23 0840   PT Last Visit   PT Visit Date 12/21/23   Note Type   Note Type Treatment   Pain Assessment   Pain Assessment Tool 0-10   Pain Score 8   Pain Location/Orientation Orientation: Lower;Location: Back   Hospital Pain Intervention(s) Repositioned;Emotional support   Restrictions/Precautions   Other Precautions Cognitive;Chair Alarm;Bed Alarm   Subjective   Subjective The patient is pleasant, but she notes continued pain this morning.   Bed Mobility   Supine to Sit 2  Maximal assistance   Additional items Assist x 2;Increased time required;Verbal cues;LE management;HOB elevated   Sit to Supine 2  Maximal assistance   Additional items Assist x 2;HOB elevated;Increased time required;Verbal cues;LE management   Transfers   Sit to Stand 1  Dependent   Additional items Assist x 2;Increased time required;Verbal cues   Stand to Sit 2  Maximal assistance   Additional items Assist x 2;Increased time required;Verbal cues   Balance   Static Sitting Poor +   Dynamic Sitting Poor   Static Standing Poor -   Activity Tolerance   Activity Tolerance Patient tolerated treatment well;Patient limited by fatigue;Patient limited by pain   Exercises   Knee AROM Long Arc Quad Sitting;5 reps;AROM;Bilateral   Assessment   Prognosis Fair   Problem List Decreased strength;Decreased endurance;Impaired balance;Decreased mobility;Decreased coordination;Decreased cognition;Impaired judgement;Decreased safety awareness;Orthopedic restrictions;Pain   Assessment The patient was able to progress to standing trials today, but she continues to require significant assistance for all mobility. She fatigued rapidly with the two standing trials, and she required several minutes in order to recover. She does demonstrate improving LE strength today. Will continue to follow in order to safely progress her functional mobility.   Barriers to Discharge Inaccessible home environment;Decreased caregiver support   Goals    Patient Goals To have less pain.   STG Expiration Date 12/29/23   PT Treatment Day 3   Plan   Treatment/Interventions Functional transfer training;LE strengthening/ROM;Therapeutic exercise;Endurance training;Patient/family training;Bed mobility;Gait training   Progress Slow progress, decreased activity tolerance   PT Frequency 1-2x/wk   Discharge Recommendation   Rehab Resource Intensity Level, PT II (Moderate Resource Intensity)   AM-PAC Basic Mobility Inpatient   Turning in Flat Bed Without Bedrails 2   Lying on Back to Sitting on Edge of Flat Bed Without Bedrails 1   Moving Bed to Chair 1   Standing Up From Chair Using Arms 1   Walk in Room 1   Climb 3-5 Stairs With Railing 1   Basic Mobility Inpatient Raw Score 7   Turning Head Towards Sound 3   Follow Simple Instructions 3   Low Function Basic Mobility Raw Score  13   Low Function Basic Mobility Standardized Score  20.14   Highest Level Of Mobility   JH-HLM Goal 2: Bed activities/Dependent transfer   JH-HLM Achieved 3: Sit at edge of bed       An AM-PAC Basic Mobility raw score less than 16 suggests the patient may benefit from discharge to post-acute rehab services.    Alvaro Lynn, PTA

## 2023-12-21 NOTE — CASE MANAGEMENT
Case Management Discharge Planning Note    Patient name Lucero Booth  Location [facility]01 MRN [id number]  : 1935 Date 2023       Current Admission Date: 2023  Current Admission Diagnosis:Sepsis (HCC)   Patient Active Problem List    Diagnosis Date Noted    Anemia 2023    ABRAHAM (nonalcoholic steatohepatitis) 2023    Atrial fibrillation (HCC) 2023    Sepsis (HCC) 12/15/2023    Bacteremia 12/15/2023    Cellulitis of right upper extremity 12/15/2023    Acute respiratory failure (HCC) 12/15/2023    Stroke-like symptoms 2023    Acute metabolic encephalopathy 2023    Stage IV decubitus ulcer (HCC) 2023    Polypharmacy 2023    Acute kidney insufficiency 2023    Degenerative disc disease, thoracic 2023    Ambulatory dysfunction 2023    Primary hypertension 2023    Major depressive disorder, recurrent, in remission, unspecified (HCC) 2023    Primary insomnia 2023    Acquired hypothyroidism 2023    Elevated troponin 2023      LOS (days): 7  Geometric Mean LOS (GMLOS) (days): 5.1  Days to GMLOS:-1.6     OBJECTIVE:  Risk of Unplanned Readmission Score: 24.34         Current admission status: Inpatient   Preferred Pharmacy:   [facility] - [place], [place] - [address]  [address]  [place] [address]  Phone: [phone] Fax: [phone]    Primary Care Provider: Joe Briggs    Primary Insurance: [facility]  Secondary Insurance:     DISCHARGE DETAILS:    Discharge planning discussed with:: Pt daughter Aundrea  Freedom of Choice: Yes  Comments - Freedom of Choice: Pt to return to [facility] contacted family/caregiver?: Yes  Were Treatment Team discharge recommendations reviewed with patient/caregiver?: Yes  Did patient/caregiver verbalize understanding of patient care needs?: Yes  Were patient/caregiver advised of the risks associated with  not following Treatment Team discharge recommendations?: Yes    Contacts  Patient Contacts: Aundreagetachew Booth  Relationship to Patient:: Family (daughter)  Contact Method: Phone  Phone Number: [phone]  Reason/Outcome: Continuity of Care, Emergency Contact, Discharge Planning    Requested Home Health Care         Is the patient interested in HHC at discharge?: Yes  Home Health Discipline requested:: Occupational Therapy, Physical Therapy, Nursing  Home Health Agency Name:: Other ( Health)  HHA External Referral Reason (only applicable if external HHA name selected): Patient has established relationship with provider  Home Health Follow-Up Provider:: PCP  Home Health Services Needed:: Wound/Ostomy Care, Strengthening/Theraputic Exercises to Improve Function  Homebound Criteria Met:: Requires Medical Transportation, Requires the Assistance of Another Person for Safe Ambulation or to Leave the Home, Uses an Assist Device (i.e. cane, walker, etc)  Supporting Clincal Findings:: Bed Bound or Wheelchair Bound    DME Referral Provided  Referral made for DME?: No    Other Referral/Resources/Interventions Provided:  Interventions: Assisted Living         Treatment Team Recommendation: Personal Care Home  Discharge Destination Plan:: Personal Care Home  Transport at Discharge : [employer]     Number/Name of Dispatcher: FERNANDA [phone]  Transported by (Company and Unit #): [employer] [phone]  ETA of Transport (Date): 12/21/23  ETA of Transport (Time): 1500        Accompanied by: Alone           Family notified:: GUCCI spoke with Aundrea and advised of transfer. Aundrea has all her items from her room and wanted to make sure that AVS went with pt. GUCCI told Aundrea report is called and faxed to [facility]  Additional Comments: Pt is COVID+. GUCCI spoke with Amelia [phone] @ [facility] and advised. Pati stated pt is in a private room so she is able to return. CM provided time of transport is 3pm    Accepting Facility Name,  City & State : [facility]  Receiving Facility/Agency Phone Number: [phone]-[facility]  Facility/Agency Fax Number: [phone]

## 2023-12-21 NOTE — PROGRESS NOTES
Progress Note - Infectious Disease   Lucero Booth 88 y.o. female MRN: [id number]  Unit/Bed#: [facility] 734-01 Encounter: [id number]      Impression/Recommendations:  Sepsis, POA.    WBC, RR.  Due to bacteremia.  No other appreciable source.  UA, flu/RSV/COVID PCR, CT C/A/P otherwise negative.  Improved.  Rec:  Continue antibiotics as below  Follow temperatures and WBC closely  Supportive care as per the primary service     Group G Strep bacteremia.    Consider due to RUE cellulitis versus other skin source given multiple wounds as below.  Doubt relation to recent pseudoaneurysm given normal exam and imaging.  No indwelling intravascular devices.  Repeat blood cultures 12/16 negative.  TTE (technically difficult) negative.  Rec:  Continue Keflex through 12/25 to complete 10 days total antibiotics     RUE cellulitis.    Unclear etiology.  Unusual location and distribution.  Improving.  Rec:  Continue antibiotics as above  Serial exams     Left upper arm wound.  Possibly pressure wound due to overall appearance.  No apparent superinfection.  Rec:  LWC per nursing     Chronic sacral decubitus ulcer.  No apparent acute superinfection on photo review.  Consider chronic osteomyelitis given CT findings.  Rec:  LWC per nursing     Acute encephalopathy.  Suspect toxic metabolic due to sepsis as above.  CT head, CTA negative.  Improved.     Recent L SFA pseudoaneurysm.  Thought iatrogenic from recent Micra pacer placement at OSH.  Status post thrombin injection 11/3/23 with resolution on repeat Doppler.  No noted abnormality on CT A/P this admission although non-contrast and exam benign.     Cirrhosis.  Thought due to ABRAHAM.  T. Bili slightly elevated which may be due to sepsis as above, now improved.  CT A/P unremarkable.     Status post spinal fusion.  Chronic back pain which patient reports is stable.     Cephalosporin allergy.  Unknown reaction.  Received cefepime in ED without issue.  Tolerating cefazolin.     The patient is  stable from an ID standpoint for D/C to SNF.  We will sign off for now.  Please call with new questions.     Antibiotics:  Keflex #3  Antibiotics #6    Subjective/24 Hour Events:  Patient seen on AM rounds.  Sleeping and not awakened.  No events noted overnight.    Objective:  Vitals:  Temp:  [97.4 °F (36.3 °C)-98.1 °F (36.7 °C)] 97.4 °F (36.3 °C)  HR:  [50-74] 74  Resp:  [16] 16  BP: (106-133)/(48-55) 133/55  SpO2:  [90 %-99 %] 93 %  Temp (24hrs), Av.7 °F (36.5 °C), Min:97.4 °F (36.3 °C), Max:98.1 °F (36.7 °C)  Current: Temperature: (!) 97.4 °F (36.3 °C)    Physical Exam:   General:  No acute distress  Psychiatric:  Awake and alert  Pulmonary:  Normal respiratory excursion without accessory muscle use  Abdomen:  Soft, nontender  Extremities:  Improved cellulitis RUE  Skin:  No rashes    Lab Results:  I have personally reviewed pertinent labs.  Results from last 7 days   Lab Units 23  0738 23  0625 23  0522 23  0542 23  0638 12/15/23  0447   POTASSIUM mmol/L 4.2 4.4 4.4   < > 3.8 3.4*   CHLORIDE mmol/L 100 101 102   < > 105 100   CO2 mmol/L 26 23 23   < > 23 21   BUN mg/dL 32* 37* 40*   < > 36* 34*   CREATININE mg/dL 0.82 0.84 0.82   < > 0.97 0.96   EGFR ml/min/1.73sq m 65 63 65   < > 53 53   CALCIUM mg/dL 7.5* 7.5* 7.1*   < > 7.6* 7.7*   AST U/L  --  19  --   --  42* 28   ALT U/L  --  <3*  --   --  13 12   ALK PHOS U/L  --  100  --   --  135* 127*    < > = values in this interval not displayed.     Results from last 7 days   Lab Units 23  0738 23  0625 23  0522   WBC Thousand/uL 14.03* 13.46* 16.84*   HEMOGLOBIN g/dL 9.1* 9.5* 8.6*   PLATELETS Thousands/uL 241 215 189     Results from last 7 days   Lab Units 23  0638 12/15/23  0456 23   BLOOD CULTURE  No Growth After 4 Days.  No Growth After 4 Days.  --  Beta Hemolytic Streptococcus Group G*  Beta Hemolytic Streptococcus Group G*   GRAM STAIN RESULT   --   --  Gram positive cocci in pairs and  chains*  Gram positive cocci in pairs and chains*   MRSA CULTURE ONLY   --  Methicillin Resistant Staphylococcus aureus isolated*  Please note: This patient requires contact precautions.  --        Imaging Studies:   I have personally reviewed pertinent imaging study reports and images in PACS.    EKG, Pathology, and Other Studies:   I have personally reviewed pertinent reports.

## 2023-12-21 NOTE — ASSESSMENT & PLAN NOTE
Patient is back to RA  Responded well to Lasix  Will closely monitor and supplement if oxygen saturation drops below 89  Likely secondary to bilateral pleural effusion noted on CT scan  Patient was incidentally found to have COVID

## 2023-12-21 NOTE — DISCHARGE SUMMARY
[facility]  Discharge- Lucero Booth 1935, 88 y.o. female MRN: [id number]  Unit/Bed#: [facility] 734-01 Encounter: [id number]  Primary Care Provider: Joe Briggs   Date and time admitted to hospital: 12/14/2023  4:55 PM    * Sepsis (HCC)  Assessment & Plan  Evidenced by tachypnea and leukocytosis  Blood cultures with beta-hemolytic Streptococcus group G  Possible source of right upper extremity cellulitis  UA/flu/RSV/COVID unremarkable  CT CAP no other acute findings of infection aside from likely chronic osteomyelitis underlying sacral decubitus ulcer  Continue Keflex until December 25, 2023 for total of 10 days  ID following, appreciate recommendations  WBC continues to trend down    COVID  Assessment & Plan  Patient was COVID screening prior to returning back to facility.  No indication to start antiviral therapy at this time    Atrial fibrillation (HCC)  Assessment & Plan  Patient is rate controlled on metoprolol and anticoagulated on Eliquis  Patient's heart rate is a little bradycardic will increase the holding parameter for metoprolol to 65    ABRAHAM (nonalcoholic steatohepatitis)  Assessment & Plan  Imaging suggestive of cirrhosis likely ABRAHAM per GI  Liver enzymes normal  Outpatient follow-up with GI recommended    Anemia  Assessment & Plan  Today's hemoglobin is 8.6  Monitor closely  No signs of bleeding thus far  Will transfuse below 7    Acute respiratory failure (HCC)  Assessment & Plan  Patient is back to RA  Responded well to Lasix  Will closely monitor and supplement if oxygen saturation drops below 89  Likely secondary to bilateral pleural effusion noted on CT scan  Patient was incidentally found to have COVID      Cellulitis of right upper extremity  Assessment & Plan  Right upper extremity with erythema in the antecubital region  Cellulitis versus possible allergy from cefepime administration yesterday given patient's history of  cephalosporin  Transition to Keflex to complete total 10 days of antibiotic therapy through 12/25  Monitor closely    Bacteremia  Assessment & Plan  Streptococcal bacteremia, possible right upper extremity cellulitis as source.  TTE (limited) negative for endocarditis  CT CAP unremarkable for any source of infection  Transition to Keflex to complete total 10 days of antibiotics through 12/25  ID following, appreciate recommendations  Repeat blood cultures negative     Elevated troponin  Assessment & Plan  Patient with elevated troponin on first determination however became within normal limits on second determination.  Most likely could be a consequence of infection.    Acquired hypothyroidism  Assessment & Plan  Continue levothyroxine 25 mcg.    Primary insomnia  Assessment & Plan  At home patient takes melatonin, Ativan and Xanax  Holding Ativan and Xanax  Continue melatonin    Major depressive disorder, recurrent, in remission, unspecified (HCC)  Assessment & Plan  Patient on haloperidol which is currently on hold.  Continue Cymbalta    Ambulatory dysfunction  Assessment & Plan  Physical and Occupational Therapy consult, case management.  Recommended level 2    Degenerative disc disease, thoracic  Assessment & Plan  Physical and Occupational Therapy consults with case management.    Polypharmacy  Assessment & Plan  Patient is on multiple medications including pain medications, sedatives, antidepressants.    Patient's daughter is in the room and has been advised regarding discontinuation of these medications so that we can fully assess improvement of her mental status.  Resume as able    Stage IV decubitus ulcer (HCC)  Assessment & Plan  Wound care management  Surgery consulted, no acute intervention recommended  CT showing signs of underlying osteomyelitis though chronicity is uncertain    Acute metabolic encephalopathy  Assessment & Plan  Initially presenting with strokelike symptoms with right-sided facial  droop and generalized weakness with slurred speech.    CT head and CTA head and neck unremarkable for any acute stroke  MRI deferred by neurology  Neurology suspects metabolic encephalopathy in setting of sepsis  Holding home Haldol and Xanax at this time given encephalopathy  See additional management per sepsis A/P  Significantly improved, patient is back to baseline    Stroke-like symptoms  Assessment & Plan  Presented with right facial droop and slurred speech  CT head without any acute abnormality  CTA head and neck showed no large vessel occlusion or acute thrombus but it was a difficult study  Neurology consulted for suspected metabolic encephalopathy in the setting of sepsis  Stroke pathway discontinued  MRI deferred        Medical Problems       Resolved Problems  Date Reviewed: 12/18/2023   None       Discharging Physician / Practitioner: Lc Wilburn MD  PCP: Joe Briggs  Admission Date:   Admission Orders (From admission, onward)       Ordered        12/14/23 2114  Inpatient Admission  Once                          Discharge Date: 12/21/23    Consultations During Hospital Stay:  Pharmacy  Infectious disease  General surgery  Geriatric medicine  Neurology    Procedures Performed:   None    Significant Findings / Test Results:   1.  Moderate bilateral pleural effusions with overlying compressive atelectasis.    2.  Irregularity and sclerosis of the distal sacrum and coccyx underlying the large decubitus ulcer suspicious for osteomyelitis though chronicity is uncertain as there are no prior studies available for comparison. This would be better evaluated with   MRI.    3.  Hepatic cirrhosis.    4.  Right ventral hernia containing a nonobstructed loop of transverse colon.    Incidental Findings:   COVID  I reviewed the above mentioned incidental findings with the patient and/or family and they expressed understanding.    Test Results Pending at Discharge (will require follow up):   none    "  Outpatient Tests Requested:  None    Complications:  None    Reason for Admission: Stroke like symptoms    Hospital Course:   Lucero Booth is a 88 y.o. female patient who originally presented to the hospital on 12/14/2023 due to patient was initially brought in as a stroke alert due to right-sided facial droop.  CT was done without any acute abnormality.  MRI was deferred.  Patient was found to be septic.  Infectious disease was consulted patient was started on antibiotics.  Most likely source of sepsis come from cellulitis of the right upper extremity.  Patient is doing significantly better and she will be discharged on Keflex and continue Keflex through December 25, 2023.  While patient was septic she did grow streptococcal in her blood culture repeat blood cultures have been negative since.    Prior to discharge patient was required to get COVID testing which she incidentally was found to be positive.    Please see above list of diagnoses and related plan for additional information.     Condition at Discharge: stable    Discharge Day Visit / Exam:   Subjective: This is a very pleasant 88-year-old female who was seen and evaluated today at bedside.  Patient has no acute complaints at this time.  Vitals: Blood Pressure: 114/51 (12/21/23 1015)  Pulse: 72 (12/21/23 1015)  Temperature: (!) 97.4 °F (36.3 °C) (12/21/23 0802)  Temp Source: Oral (12/19/23 2233)  Respirations: 16 (12/20/23 2213)  Height: 5' 3\" (160 cm) (12/16/23 0822)  Weight - Scale: 102 kg (225 lb 8.5 oz) (12/21/23 0600)  SpO2: 94 % (12/21/23 1015)  Exam:   Physical Exam  Vitals reviewed.   Constitutional:       General: She is not in acute distress.     Appearance: Normal appearance. She is not ill-appearing.   HENT:      Head: Normocephalic and atraumatic.      Right Ear: External ear normal.      Left Ear: External ear normal.      Nose: Nose normal.   Eyes:      Extraocular Movements: Extraocular movements intact.      Conjunctiva/sclera: " Conjunctivae normal.   Cardiovascular:      Rate and Rhythm: Normal rate and regular rhythm.      Pulses: Normal pulses.      Heart sounds: Normal heart sounds.   Pulmonary:      Effort: Pulmonary effort is normal.      Breath sounds: Normal breath sounds.   Abdominal:      General: Abdomen is flat. Bowel sounds are normal.      Palpations: Abdomen is soft.   Musculoskeletal:         General: No deformity or signs of injury.      Cervical back: Normal range of motion.   Skin:     General: Skin is warm and dry.      Findings: Lesion present.   Neurological:      General: No focal deficit present.      Mental Status: She is alert. Mental status is at baseline.   Psychiatric:         Mood and Affect: Mood normal.         Behavior: Behavior normal.          Discussion with Family: Patient declined call to .     Discharge instructions/Information to patient and family:   See after visit summary for information provided to patient and family.      Provisions for Follow-Up Care:  See after visit summary for information related to follow-up care and any pertinent home health orders.      Mobility at time of Discharge:   Basic Mobility Inpatient Raw Score: 6  JH-HLM Goal: 2: Bed activities/Dependent transfer  JH-HLM Achieved: 2: Bed activities/Dependent transfer  HLM Goal achieved. Continue to encourage appropriate mobility.     Disposition:   Assisted Living Facility at [facility], [place], operated by [facility]    Planned Readmission: None     Discharge Statement:  I spent 45 minutes discharging the patient. This time was spent on the day of discharge. I had direct contact with the patient on the day of discharge. Greater than 50% of the total time was spent examining patient, answering all patient questions, arranging and discussing plan of care with patient as well as directly providing post-discharge instructions.  Additional time then spent on discharge activities.    Discharge Medications:  See after visit summary for  reconciled discharge medications provided to patient and/or family.      **Please Note: This note may have been constructed using a voice recognition system**

## 2023-12-22 ENCOUNTER — HOSPITAL ENCOUNTER (EMERGENCY)
Facility: HOSPITAL | Age: 88
Discharge: HOME | End: 2023-12-22
Attending: EMERGENCY MEDICINE
Payer: COMMERCIAL

## 2023-12-22 VITALS
HEART RATE: 68 BPM | HEART RATE: 68 BPM | RESPIRATION RATE: 18 BRPM | RESPIRATION RATE: 18 BRPM | OXYGEN SATURATION: 92 % | SYSTOLIC BLOOD PRESSURE: 146 MMHG | TEMPERATURE: 97.8 F | OXYGEN SATURATION: 92 % | DIASTOLIC BLOOD PRESSURE: 80 MMHG | SYSTOLIC BLOOD PRESSURE: 146 MMHG | DIASTOLIC BLOOD PRESSURE: 80 MMHG | TEMPERATURE: 97.8 F

## 2023-12-22 DIAGNOSIS — L89.44 PRESSURE INJURY OF CONTIGUOUS REGION INVOLVING BACK, BUTTOCK, AND HIP, STAGE 4, UNSPECIFIED LATERALITY (HCC): Primary | ICD-10-CM

## 2023-12-22 DIAGNOSIS — R29.90 STROKE-LIKE SYMPTOMS: ICD-10-CM

## 2023-12-22 DIAGNOSIS — Z76.89 ENCOUNTER FOR MEDICATION ADMINISTRATION: Primary | ICD-10-CM

## 2023-12-22 PROCEDURE — 99282 EMERGENCY DEPT VISIT SF MDM: CPT

## 2023-12-22 PROCEDURE — 99284 EMERGENCY DEPT VISIT MOD MDM: CPT | Performed by: EMERGENCY MEDICINE

## 2023-12-22 RX ORDER — OXYCODONE HYDROCHLORIDE 5 MG/1
5 TABLET ORAL ONCE
Status: COMPLETED | OUTPATIENT
Start: 2023-12-22 | End: 2023-12-22

## 2023-12-22 RX ADMIN — OXYCODONE HYDROCHLORIDE 5 MG: 5 TABLET ORAL at 05:14

## 2023-12-22 NOTE — ED NOTES
Patient's son called for an update. Updated provided on plan of care and patient's status.      Angeline Agee RN  12/21/23 3252

## 2023-12-22 NOTE — ED ATTENDING ATTESTATION
12/21/2023  I, Adan Vogel DO, saw and evaluated the patient. I have discussed the patient with the resident/non-physician practitioner and agree with the resident's/non-physician practitioner's findings, Plan of Care, and MDM as documented in the resident's/non-physician practitioner's note, except where noted. All available labs and Radiology studies were reviewed.  I was present for key portions of any procedure(s) performed by the resident/non-physician practitioner and I was immediately available to provide assistance.       At this point I agree with the current assessment done in the Emergency Department.  I have conducted an independent evaluation of this patient a history and physical is as follows:      89 yo female presents from [facility] for evaluation of COVID. Was dx with COVID earlier today. Sent back from [facility] because she has COVID, apparently staff were not informed.     SpO2 94% in RA  No distress  Lungs CTAB    Will contact [facility], if agreeable to receive COVID+ patient, will discharge.      ED Course         Critical Care Time  Procedures

## 2023-12-22 NOTE — UTILIZATION REVIEW
NOTIFICATION OF ADMISSION DISCHARGE   This is a Notification of Discharge from [facility]. Please be advised that this patient has been discharge from our facility. Below you will find the admission and discharge date and time including the patient’s disposition.   UTILIZATION REVIEW CONTACT:  Do Ayala  Utilization   Network Utilization Review Department  Phone: [phone] x carefully listen to the prompts. All voicemails are confidential.  Email: [email or web address]     ADMISSION INFORMATION  PRESENTATION DATE: 12/14/2023  4:55 PM  OBERVATION ADMISSION DATE:   INPATIENT ADMISSION DATE: 12/14/23  9:14 PM   DISCHARGE DATE: 12/21/2023  4:29 PM   DISPOSITION:Home/Self Care    Network Utilization Review Department  ATTENTION: Please call with any questions or concerns to [phone] and carefully listen to the prompts so that you are directed to the right person. All voicemails are confidential.   For Discharge needs, contact Care Management DC Support Team at [phone] opt. 2  Send all requests for admission clinical reviews, approved or denied determinations and any other requests to dedicated fax number below belonging to the campus where the patient is receiving treatment. List of dedicated fax numbers for the Facilities:  FACILITY NAME UR FAX NUMBER   ADMISSION DENIALS (Administrative/Medical Necessity) [phone]   DISCHARGE SUPPORT TEAM ([facility]) [phone]   PARENT CHILD HEALTH (Maternity/NICU/Pediatrics) [phone]   [facility] [phone]   [facility] [phone]   [facility] [phone]   [facility] [phone]   [facility] [phone]   [facility] [phone]   [facility] [phone]   [facility] [phone]    [facility] [phone]   [facility] [phone]   General acute hospital [phone]

## 2023-12-22 NOTE — ED PROVIDER NOTES
"      Final Diagnoses:     1. Encounter for medication administration           Nursing Triage:     Chief Complaint   Patient presents with    Flu Symptoms     Pt is COVID positive. Pt O2 is 94% on RA.     HPI:   This is a 88 y.o. female presenting for evaluation of anxiety.   Relevant past medical history dementia, hypothyroidism, major depressive disorder, hypertension, ambulatory dysfunction, COVID.  Patient came from [facility] after being discharged recently.  Per staff she was sent back because she had asked for her as needed oxycodone and they stated that they did not have it at the assisted facility.  Patient then had a \"panic attack\" so they called 911 for her to come here.  Patient denies any symptoms at this time and states that the only reason she was sent back here is because she has COVID and they do not want her there with COVID.  Patient denies any headache, chest pain, shortness of breath, N/V/D, dysuria.  ASSESSMENT + PLAN:   Will discharge after giving as needed oxycodone 5 mg per her chart.  Speaking with the staff at the facility they stated that this is acceptable as the pharmacist will come in at 10 AM and they will be able to give her PRNs.    Physical:   Physical Exam  Vitals and nursing note reviewed.   Constitutional:       Appearance: Normal appearance.   HENT:      Head: Normocephalic and atraumatic.      Nose: Nose normal.      Mouth/Throat:      Mouth: Mucous membranes are moist.      Pharynx: No oropharyngeal exudate or posterior oropharyngeal erythema.   Eyes:      Extraocular Movements: Extraocular movements intact.      Conjunctiva/sclera: Conjunctivae normal.      Pupils: Pupils are equal, round, and reactive to light.   Cardiovascular:      Rate and Rhythm: Normal rate and regular rhythm.      Pulses: Normal pulses.      Heart sounds: Normal heart sounds.   Pulmonary:      Effort: Pulmonary effort is normal.      Breath sounds: Normal breath sounds.   Abdominal:      " General: Abdomen is flat. There is no distension.      Palpations: Abdomen is soft.      Tenderness: There is no abdominal tenderness.   Musculoskeletal:      Cervical back: Normal range of motion.   Lymphadenopathy:      Cervical: No cervical adenopathy.   Skin:     General: Skin is warm and dry.      Capillary Refill: Capillary refill takes less than 2 seconds.   Neurological:      General: No focal deficit present.      Mental Status: She is alert and oriented to person, place, and time.      Cranial Nerves: No cranial nerve deficit.      Sensory: No sensory deficit.   Psychiatric:         Mood and Affect: Mood normal.         Behavior: Behavior normal.         Vitals:    12/22/23 0403 12/22/23 0515   BP: 167/73 146/80   Pulse: 67 68   Resp: 18 18   Temp: 97.8 °F (36.6 °C)    TempSrc: Oral    SpO2: 94% 92%     Lab Results   Component Value Date    POCGLU 182 (H) 12/14/2023       - There are no obvious limitations to social determinants of care.   - Nursing note reviewed.   - Vitals reviewed.   - Orders placed by myself and/or advanced practitioner / resident.    - Previous chart was reviewed  - No language barrier.   - History obtained from patient.    - There are no limitations to the history obtained:     Past Medical:    has no past medical history on file.    Past Surgical:    has no past surgical history on file.    Social:     Social History     Substance and Sexual Activity   Alcohol Use Not on file     Social History     Tobacco Use   Smoking Status Not on file   Smokeless Tobacco Not on file     Social History     Substance and Sexual Activity   Drug Use Not on file       Echo:   No results found for this or any previous visit.    No results found for this or any previous visit.      Cath:    No results found for this or any previous visit.      Code Status: Prior  Advance Directive and Living Will:      Power of :    POLST:    Medications   oxyCODONE (ROXICODONE) IR tablet 5 mg (5 mg Oral Given  12/22/23 0514)     No orders to display     No orders of the defined types were placed in this encounter.    Labs Reviewed - No data to display  Time reflects when diagnosis was documented in both MDM as applicable and the Disposition within this note       Time User Action Codes Description Comment    12/22/2023  5:10 AM Jon Dumont [Z76.89] Encounter for medication administration           ED Disposition       ED Disposition   Discharge    Condition   Stable    Date/Time   Fri Dec 22, 2023  5:10 AM    Comment   Lucero Booth discharge to home/self care.                   Follow-up Information    None       Patient's Medications   Discharge Prescriptions    No medications on file     No discharge procedures on file.  Prior to Admission Medications   Prescriptions Last Dose Informant Patient Reported? Taking?   ALPRAZolam (NIRAVAM) 0.5 MG dissolvable tablet   Yes No   Sig: Take 0.5 mg by mouth every 6 (six) hours as needed for anxiety   ALPRAZolam (XANAX) 0.5 mg tablet   Yes No   Sig: Take 0.5 mg by mouth daily at bedtime   DULoxetine (CYMBALTA) 30 mg delayed release capsule   Yes No   Sig: Take 30 mg by mouth daily   Diclofenac Sodium (VOLTAREN) 1 %   Yes No   Sig: Apply 2 g topically 4 (four) times a day   Fluticasone Furoate-Vilanterol (Breo Ellipta) 100-25 mcg/actuation inhaler   Yes No   Sig: Inhale 1 puff daily Rinse mouth after use.   LORazepam (ATIVAN) 1 mg tablet   Yes No   Sig: Take 1 mg by mouth every 6 (six) hours as needed for anxiety   Lidocaine (HM Lidocaine Patch) 4 % PTCH   Yes No   Sig: Apply 1 Application topically in the morning   acetaminophen (TYLENOL) 325 mg tablet   Yes No   Sig: Take 650 mg by mouth every 6 (six) hours as needed for mild pain, headaches or fever   amLODIPine-atorvastatin (CADUET) 5-10 MG per tablet   Yes No   Sig: Take 1 tablet by mouth daily   amiodarone 200 mg tablet   Yes No   Sig: Take 200 mg by mouth daily   ammonium lactate (LAC-HYDRIN) 12 % lotion   Yes No  "  Sig: Apply 1 Application topically daily   apixaban (Eliquis) 2.5 mg   Yes No   Sig: Take 2.5 mg by mouth 2 (two) times a day   camphor-menthol (SARNA) lotion   Yes No   Sig: Apply 1 Application topically as needed for itching   cephalexin (KEFLEX) 500 mg capsule   No No   Sig: Take 1 capsule (500 mg total) by mouth every 6 (six) hours for 4 days   ferrous sulfate 325 (65 Fe) mg tablet   Yes No   Sig: Take 325 mg by mouth every other day   haloperidol (HALDOL) oral concentrated solution   Yes No   Sig: Take 1 mg by mouth every 6 (six) hours as needed for agitation   hyoscyamine (LEVSIN/SL) 0.125 mg SL tablet   Yes No   Sig: Take 0.125 mg by mouth every 6 (six) hours as needed for cramping   latanoprost (XALATAN) 0.005 % ophthalmic solution   Yes No   Si drop daily at bedtime   levothyroxine 25 mcg tablet   Yes No   Sig: Take 25 mcg by mouth daily   lisinopril (ZESTRIL) 10 mg tablet   Yes No   Sig: Take 10 mg by mouth daily   melatonin 3 mg   Yes No   Sig: Take 3 mg by mouth daily at bedtime   methocarbamol (ROBAXIN) 750 mg tablet   Yes No   Sig: Take 750 mg by mouth every 4 (four) hours as needed for muscle spasms   metoprolol succinate (TOPROL-XL) 100 mg 24 hr tablet   Yes No   Sig: Take 100 mg by mouth daily   oxyCODONE-acetaminophen (PERCOCET) 5-325 mg per tablet   Yes No   Sig: Take 1 tablet by mouth every 8 (eight) hours as needed for moderate pain   pseudoephedrine (SUDAFED) 30 mg tablet   Yes No   Sig: Take 60 mg by mouth every 6 (six) hours as needed for congestion      Facility-Administered Medications: None                        Portions of the record may have been created with voice recognition software. Occasional wrong word or \"sound a like\" substitutions may have occurred due to the inherent limitations of voice recognition software. Read the chart carefully and recognize, using context, where substitutions have occurred.       Jon Dumont MD  23 [id number]    "

## 2023-12-22 NOTE — ED ATTENDING ATTESTATION
"12/22/2023  I, Ajay Arriola MD, saw and evaluated the patient. I have discussed the patient with the resident/non-physician practitioner and agree with the resident's/non-physician practitioner's findings, Plan of Care, and MDM as documented in the resident's/non-physician practitioner's note, except where noted. All available labs and Radiology studies were reviewed.  I was present for key portions of any procedure(s) performed by the resident/non-physician practitioner and I was immediately available to provide assistance.       At this point I agree with the current assessment done in the Emergency Department.  I have conducted an independent evaluation of this patient a history and physical is as follows:    88-year-old female with COVID-19 presents back to the emergency department from [facility].  Patient was seen here yesterday for concerns of hypoxia, was found to be saturating fine on room air and was sent back to nursing facility.  Upon arrival to the emergency department, patient is saturating in the mid 90s on room air.  No increased work of breathing.    On exam, patient was comfortably bed in no acute distress, does normocephalic atraumatic, pupils equal round reactive to light, neck is supple without meningismus signs, heart is regular rate and rhythm with intact distal pulses, no increased work of breathing, respiratory distress, or stridor.    COVID-19, no increased work of breathing or hypoxia.  No further workup necessary.  Will discharge back to [facility]        ED Course  ED Course as of 12/22/23 0525   Fri Dec 22, 2023   0522 Per Kristi at [facility], they called the ambulance because the patient was requesting her oxycodone and when the nursing staff informed her that they did not have her medications yet, she \"had a panic attack.\"  So they sent her here for further evaluation.  Patient not currently anxious or complaining of any pain, but since the pharmacy cannot provide the " nursing facility with the medications until 10 AM, will give dose of the patient's oxycodone here which is prescribed as every 6 hours.  Patient will be discharged back to facility.         Critical Care Time  Procedures

## 2023-12-22 NOTE — ED PROVIDER NOTES
History  Chief Complaint   Patient presents with    Flu Symptoms     Pt was discharged from the hospital about 3 hours ago. Pt is a resident at [facility]. Pt tested COVID positive and nursing facility was upset pt was discharged with COVID. Pt is on RA at 94%. PT has hx of dementia.      HPI  Patient is 88-year-old female presenting for COVID and concerns of hypoxia from [facility].  Past medical history significant for dementia, hypothyroidism, MDD, hypertension, ambulatory dysfunction.  Patient was recently diagnosed with COVID earlier today however [facility] states that they are concerned because she had COVID and that she apparently had a desaturation episode under their care.  On presentation in the ER, patient has no complaints.  Is satting well on room air, no signs of respiratory distress.  Prior to Admission Medications   Prescriptions Last Dose Informant Patient Reported? Taking?   ALPRAZolam (NIRAVAM) 0.5 MG dissolvable tablet   Yes No   Sig: Take 0.5 mg by mouth every 6 (six) hours as needed for anxiety   ALPRAZolam (XANAX) 0.5 mg tablet   Yes No   Sig: Take 0.5 mg by mouth daily at bedtime   DULoxetine (CYMBALTA) 30 mg delayed release capsule   Yes No   Sig: Take 30 mg by mouth daily   Diclofenac Sodium (VOLTAREN) 1 %   Yes No   Sig: Apply 2 g topically 4 (four) times a day   Fluticasone Furoate-Vilanterol (Breo Ellipta) 100-25 mcg/actuation inhaler   Yes No   Sig: Inhale 1 puff daily Rinse mouth after use.   LORazepam (ATIVAN) 1 mg tablet   Yes No   Sig: Take 1 mg by mouth every 6 (six) hours as needed for anxiety   Lidocaine (HM Lidocaine Patch) 4 % PTCH   Yes No   Sig: Apply 1 Application topically in the morning   acetaminophen (TYLENOL) 325 mg tablet   Yes No   Sig: Take 650 mg by mouth every 6 (six) hours as needed for mild pain, headaches or fever   amLODIPine-atorvastatin (CADUET) 5-10 MG per tablet   Yes No   Sig: Take 1 tablet by mouth daily   amiodarone 200 mg  tablet   Yes No   Sig: Take 200 mg by mouth daily   ammonium lactate (LAC-HYDRIN) 12 % lotion   Yes No   Sig: Apply 1 Application topically daily   apixaban (Eliquis) 2.5 mg   Yes No   Sig: Take 2.5 mg by mouth 2 (two) times a day   camphor-menthol (SARNA) lotion   Yes No   Sig: Apply 1 Application topically as needed for itching   cephalexin (KEFLEX) 500 mg capsule   No No   Sig: Take 1 capsule (500 mg total) by mouth every 6 (six) hours for 4 days   ferrous sulfate 325 (65 Fe) mg tablet   Yes No   Sig: Take 325 mg by mouth every other day   haloperidol (HALDOL) oral concentrated solution   Yes No   Sig: Take 1 mg by mouth every 6 (six) hours as needed for agitation   hyoscyamine (LEVSIN/SL) 0.125 mg SL tablet   Yes No   Sig: Take 0.125 mg by mouth every 6 (six) hours as needed for cramping   latanoprost (XALATAN) 0.005 % ophthalmic solution   Yes No   Si drop daily at bedtime   levothyroxine 25 mcg tablet   Yes No   Sig: Take 25 mcg by mouth daily   lisinopril (ZESTRIL) 10 mg tablet   Yes No   Sig: Take 10 mg by mouth daily   melatonin 3 mg   Yes No   Sig: Take 3 mg by mouth daily at bedtime   methocarbamol (ROBAXIN) 750 mg tablet   Yes No   Sig: Take 750 mg by mouth every 4 (four) hours as needed for muscle spasms   metoprolol succinate (TOPROL-XL) 100 mg 24 hr tablet   Yes No   Sig: Take 100 mg by mouth daily   oxyCODONE-acetaminophen (PERCOCET) 5-325 mg per tablet   Yes No   Sig: Take 1 tablet by mouth every 8 (eight) hours as needed for moderate pain   pseudoephedrine (SUDAFED) 30 mg tablet   Yes No   Sig: Take 60 mg by mouth every 6 (six) hours as needed for congestion      Facility-Administered Medications: None       No past medical history on file.    No past surgical history on file.    No family history on file.  I have reviewed and agree with the history as documented.    No existing history information found.  No existing history information found.        Review of Systems   Constitutional:          Concerns of COVID.  Hypoxia.   HENT: Negative.     Eyes: Negative.    Respiratory: Negative.     Cardiovascular: Negative.    Gastrointestinal: Negative.    Endocrine: Negative.    Genitourinary: Negative.    Musculoskeletal: Negative.    Skin: Negative.    Allergic/Immunologic: Negative.    Neurological: Negative.    Hematological: Negative.    Psychiatric/Behavioral: Negative.     All other systems reviewed and are negative.      Physical Exam  ED Triage Vitals [12/21/23 1927]   Temperature Pulse Respirations Blood Pressure SpO2   97.8 °F (36.6 °C) 68 20 148/67 94 %      Temp Source Heart Rate Source Patient Position - Orthostatic VS BP Location FiO2 (%)   Oral Monitor Lying Right arm --      Pain Score       --             Orthostatic Vital Signs  Vitals:    12/21/23 1927 12/21/23 2100 12/21/23 2230 12/21/23 2330   BP: 148/67 138/62 156/70 145/67   Pulse: 68 70 68 70   Patient Position - Orthostatic VS: Lying Lying Lying Lying       Physical Exam  Vitals and nursing note reviewed.   Constitutional:       Appearance: Normal appearance. She is normal weight.   HENT:      Head: Normocephalic and atraumatic.      Right Ear: Tympanic membrane, ear canal and external ear normal.      Left Ear: Tympanic membrane, ear canal and external ear normal.      Nose: Nose normal.      Mouth/Throat:      Mouth: Mucous membranes are moist.      Pharynx: Oropharynx is clear.   Eyes:      Extraocular Movements: Extraocular movements intact.      Conjunctiva/sclera: Conjunctivae normal.      Pupils: Pupils are equal, round, and reactive to light.   Cardiovascular:      Rate and Rhythm: Normal rate and regular rhythm.      Pulses: Normal pulses.      Heart sounds: Normal heart sounds.   Pulmonary:      Effort: Pulmonary effort is normal.      Breath sounds: Normal breath sounds.   Abdominal:      General: Abdomen is flat. Bowel sounds are normal.      Palpations: Abdomen is soft.   Musculoskeletal:         General: Normal range of  motion.      Cervical back: Normal range of motion and neck supple.   Skin:     General: Skin is warm and dry.      Capillary Refill: Capillary refill takes less than 2 seconds.   Neurological:      Mental Status: She is alert. Mental status is at baseline.   Psychiatric:         Mood and Affect: Mood normal.         ED Medications  Medications - No data to display    Diagnostic Studies  Results Reviewed       None                   No orders to display         Procedures  Procedures      ED Course  ED Course as of 12/21/23 2345   Thu Dec 21, 2023   2026 Attempted to call Shasha porter twice and was unable to reach anyone. Pt is stable on RA, no signs of Respiratory distress.                              SBIRT 20yo+      Flowsheet Row Most Recent Value   Initial Alcohol Screen: US AUDIT-C     1. How often do you have a drink containing alcohol? 0 Filed at: 12/21/2023 1929   2. How many drinks containing alcohol do you have on a typical day you are drinking?  0 Filed at: 12/21/2023 1929   3a. Male UNDER 65: How often do you have five or more drinks on one occasion? 0 Filed at: 12/21/2023 1929   3b. FEMALE Any Age, or MALE 65+: How often do you have 4 or more drinks on one occassion? 0 Filed at: 12/21/2023 1929   Audit-C Score 0 Filed at: 12/21/2023 1929   BILLY: How many times in the past year have you...    Used an illegal drug or used a prescription medication for non-medical reasons? Never Filed at: 12/21/2023 1929                  Medical Decision Making  Patient is a 88-year-old female presenting for concerns of COVID and hypoxia.  DDx: COVID  Based on patient rotation physical exam finds, primary concern is for COVID.  Given patient is no signs of respiratory distress and has been satting well on room air since arrival to the ER and with EMS.  No plans for further intervention or treatment at this time.  Will plan for discharge.  Return precautions given.  Ready for discharge.    Problems  Addressed:  COVID: acute illness or injury          Disposition  Final diagnoses:   COVID     Time reflects when diagnosis was documented in both MDM as applicable and the Disposition within this note       Time User Action Codes Description Comment    12/21/2023 10:08 PM Justin Porter Add [U07.1] COVID           ED Disposition       ED Disposition   Discharge    Condition   Stable    Date/Time   u Dec 21, 2023 2206    Comment   Lucero Booth discharge to home/self care.                   Follow-up Information       Follow up With Specialties Details Why Contact Info Additional Information    [facility] Emergency Department Emergency Medicine Go to  If symptoms worsen [address] [address]  [phone] [facility] Emergency Department, [address], [place], [address]   [phone]    Joe rBiggs Nurse Practitioner Go to  If symptoms worsen [address] [facility]  [address] [place] [address] [phone]               Patient's Medications   Discharge Prescriptions    No medications on file     No discharge procedures on file.    PDMP Review       None             ED Provider  Attending physically available and evaluated Lucero Booth. I managed the patient along with the ED Attending.    Electronically Signed by           Justin Porter MD  12/21/23 7685

## 2024-01-31 LAB
ATRIAL RATE: 66 BPM
ATRIAL RATE: 80 BPM
P AXIS: 21 DEGREES
P AXIS: 31 DEGREES
PR INTERVAL: 196 MS
PR INTERVAL: 214 MS
QRS AXIS: -20 DEGREES
QRS AXIS: -9 DEGREES
QRSD INTERVAL: 116 MS
QRSD INTERVAL: 118 MS
QT INTERVAL: 386 MS
QT INTERVAL: 460 MS
QTC INTERVAL: 404 MS
QTC INTERVAL: 530 MS
T WAVE AXIS: 33 DEGREES
T WAVE AXIS: 39 DEGREES
VENTRICULAR RATE: 66 BPM
VENTRICULAR RATE: 80 BPM

## 2024-07-29 NOTE — ASSESSMENT & PLAN NOTE
Problem: Diabetes  Goal: Achieves glycemic balance  Description: Goal is to maintain blood sugar within range with no episodes of hypoglycemia  Outcome: Monitoring/Evaluating progress   Q1H BS, insulin infusion running, frequent BMPs    Problem: Pain  Goal: Acceptable pain level achieved/maintained at rest using appropriate pain scale for the patient  Outcome: Monitoring/Evaluating progress   Fioricet and toradol given   Patient is on multiple medications including pain medications, sedatives, antidepressants.    Patient's daughter is in the room and has been advised regarding discontinuation of these medications so that we can fully assess improvement of her mental status.  Resume as able